# Patient Record
Sex: FEMALE | Race: WHITE | NOT HISPANIC OR LATINO | Employment: UNEMPLOYED | URBAN - METROPOLITAN AREA
[De-identification: names, ages, dates, MRNs, and addresses within clinical notes are randomized per-mention and may not be internally consistent; named-entity substitution may affect disease eponyms.]

---

## 2018-11-22 ENCOUNTER — HOSPITAL ENCOUNTER (INPATIENT)
Facility: HOSPITAL | Age: 73
LOS: 18 days | Discharge: NURSING, REHAB OR LONG-TERM CARE | DRG: 100 | End: 2018-12-10
Attending: PSYCHIATRY & NEUROLOGY | Admitting: PSYCHIATRY & NEUROLOGY
Payer: MEDICARE

## 2018-11-22 DIAGNOSIS — R56.9 SEIZURE: ICD-10-CM

## 2018-11-22 DIAGNOSIS — R41.82 ALTERED MENTAL STATUS, UNSPECIFIED ALTERED MENTAL STATUS TYPE: ICD-10-CM

## 2018-11-22 DIAGNOSIS — I21.9 MI (MYOCARDIAL INFARCTION): ICD-10-CM

## 2018-11-22 DIAGNOSIS — R94.31 QT PROLONGATION: ICD-10-CM

## 2018-11-22 DIAGNOSIS — I46.9 CARDIAC ARREST WITH VENTRICULAR FIBRILLATION: ICD-10-CM

## 2018-11-22 DIAGNOSIS — R41.82 ALTERED MENTAL STATUS: ICD-10-CM

## 2018-11-22 DIAGNOSIS — S12.01XA CLOSED STABLE BURST FRACTURE OF FIRST CERVICAL VERTEBRA, INITIAL ENCOUNTER: ICD-10-CM

## 2018-11-22 DIAGNOSIS — S12.9XXA CERVICAL SPINE FRACTURE: ICD-10-CM

## 2018-11-22 DIAGNOSIS — I49.9 ARRHYTHMIA: ICD-10-CM

## 2018-11-22 DIAGNOSIS — I47.20 VT (VENTRICULAR TACHYCARDIA): ICD-10-CM

## 2018-11-22 DIAGNOSIS — I49.01 CARDIAC ARREST WITH VENTRICULAR FIBRILLATION: ICD-10-CM

## 2018-11-22 DIAGNOSIS — I46.9 CARDIAC ARREST: ICD-10-CM

## 2018-11-22 PROBLEM — D72.829 LEUKOCYTOSIS: Status: ACTIVE | Noted: 2018-11-22

## 2018-11-22 LAB
ABO + RH BLD: NORMAL
ALBUMIN SERPL BCP-MCNC: 2.7 G/DL
ALP SERPL-CCNC: 71 U/L
ALT SERPL W/O P-5'-P-CCNC: 26 U/L
ANION GAP SERPL CALC-SCNC: 9 MMOL/L
APTT BLDCRRT: 25.6 SEC
AST SERPL-CCNC: 60 U/L
BACTERIA #/AREA URNS AUTO: ABNORMAL /HPF
BASOPHILS # BLD AUTO: 0.04 K/UL
BASOPHILS # BLD AUTO: 0.06 K/UL
BASOPHILS NFR BLD: 0.3 %
BASOPHILS NFR BLD: 0.3 %
BILIRUB SERPL-MCNC: 0.7 MG/DL
BILIRUB UR QL STRIP: NEGATIVE
BLD GP AB SCN CELLS X3 SERPL QL: NORMAL
BUN SERPL-MCNC: 12 MG/DL
CALCIUM SERPL-MCNC: 8.6 MG/DL
CHLORIDE SERPL-SCNC: 108 MMOL/L
CHOLEST SERPL-MCNC: 132 MG/DL
CHOLEST/HDLC SERPL: 5.5 {RATIO}
CK SERPL-CCNC: 127 U/L
CLARITY UR REFRACT.AUTO: ABNORMAL
CO2 SERPL-SCNC: 20 MMOL/L
COLOR UR AUTO: YELLOW
CREAT SERPL-MCNC: 1.1 MG/DL
DIFFERENTIAL METHOD: ABNORMAL
DIFFERENTIAL METHOD: ABNORMAL
EOSINOPHIL # BLD AUTO: 0 K/UL
EOSINOPHIL # BLD AUTO: 0 K/UL
EOSINOPHIL NFR BLD: 0 %
EOSINOPHIL NFR BLD: 0 %
ERYTHROCYTE [DISTWIDTH] IN BLOOD BY AUTOMATED COUNT: 19.5 %
ERYTHROCYTE [DISTWIDTH] IN BLOOD BY AUTOMATED COUNT: 19.8 %
EST. GFR  (AFRICAN AMERICAN): 58.4 ML/MIN/1.73 M^2
EST. GFR  (NON AFRICAN AMERICAN): 50.6 ML/MIN/1.73 M^2
ESTIMATED AVG GLUCOSE: 126 MG/DL
GLUCOSE SERPL-MCNC: 135 MG/DL
GLUCOSE UR QL STRIP: NEGATIVE
HBA1C MFR BLD HPLC: 6 %
HCT VFR BLD AUTO: 28.8 %
HCT VFR BLD AUTO: 29.8 %
HDLC SERPL-MCNC: 24 MG/DL
HDLC SERPL: 18.2 %
HGB BLD-MCNC: 8.3 G/DL
HGB BLD-MCNC: 8.5 G/DL
HGB UR QL STRIP: ABNORMAL
HYALINE CASTS UR QL AUTO: 0 /LPF
IMM GRANULOCYTES # BLD AUTO: 0.1 K/UL
IMM GRANULOCYTES # BLD AUTO: 0.15 K/UL
IMM GRANULOCYTES NFR BLD AUTO: 0.6 %
IMM GRANULOCYTES NFR BLD AUTO: 0.8 %
INR PPP: 1.1
KETONES UR QL STRIP: ABNORMAL
LACTATE SERPL-SCNC: 2.9 MMOL/L
LDLC SERPL CALC-MCNC: 92 MG/DL
LEUKOCYTE ESTERASE UR QL STRIP: ABNORMAL
LYMPHOCYTES # BLD AUTO: 1.4 K/UL
LYMPHOCYTES # BLD AUTO: 1.7 K/UL
LYMPHOCYTES NFR BLD: 10.4 %
LYMPHOCYTES NFR BLD: 7.8 %
MAGNESIUM SERPL-MCNC: 2.3 MG/DL
MCH RBC QN AUTO: 22.5 PG
MCH RBC QN AUTO: 23.8 PG
MCHC RBC AUTO-ENTMCNC: 27.9 G/DL
MCHC RBC AUTO-ENTMCNC: 29.5 G/DL
MCV RBC AUTO: 81 FL
MCV RBC AUTO: 81 FL
MICROSCOPIC COMMENT: ABNORMAL
MONOCYTES # BLD AUTO: 1.2 K/UL
MONOCYTES # BLD AUTO: 1.3 K/UL
MONOCYTES NFR BLD: 7.1 %
MONOCYTES NFR BLD: 7.7 %
NEUTROPHILS # BLD AUTO: 12.9 K/UL
NEUTROPHILS # BLD AUTO: 15.1 K/UL
NEUTROPHILS NFR BLD: 81 %
NEUTROPHILS NFR BLD: 84 %
NITRITE UR QL STRIP: NEGATIVE
NONHDLC SERPL-MCNC: 108 MG/DL
NRBC BLD-RTO: 0 /100 WBC
NRBC BLD-RTO: 0 /100 WBC
PH UR STRIP: 5 [PH] (ref 5–8)
PHOSPHATE SERPL-MCNC: 3.2 MG/DL
PLATELET # BLD AUTO: 342 K/UL
PLATELET # BLD AUTO: 368 K/UL
PMV BLD AUTO: 9.8 FL
PMV BLD AUTO: 9.9 FL
POCT GLUCOSE: 138 MG/DL (ref 70–110)
POTASSIUM SERPL-SCNC: 4.8 MMOL/L
PROT SERPL-MCNC: 8.6 G/DL
PROT UR QL STRIP: ABNORMAL
PROTHROMBIN TIME: 11.2 SEC
RBC # BLD AUTO: 3.57 M/UL
RBC # BLD AUTO: 3.69 M/UL
RBC #/AREA URNS AUTO: 14 /HPF (ref 0–4)
SODIUM SERPL-SCNC: 137 MMOL/L
SP GR UR STRIP: 1.01 (ref 1–1.03)
TRIGL SERPL-MCNC: 80 MG/DL
TROPONIN I SERPL DL<=0.01 NG/ML-MCNC: 0.14 NG/ML
TSH SERPL DL<=0.005 MIU/L-ACNC: 1.62 UIU/ML
URATE CRY UR QL COMP ASSIST: ABNORMAL
URN SPEC COLLECT METH UR: ABNORMAL
WBC # BLD AUTO: 15.92 K/UL
WBC # BLD AUTO: 18.03 K/UL
WBC #/AREA URNS AUTO: 4 /HPF (ref 0–5)

## 2018-11-22 PROCEDURE — 87070 CULTURE OTHR SPECIMN AEROBIC: CPT

## 2018-11-22 PROCEDURE — 80053 COMPREHEN METABOLIC PANEL: CPT

## 2018-11-22 PROCEDURE — 86901 BLOOD TYPING SEROLOGIC RH(D): CPT | Mod: 91

## 2018-11-22 PROCEDURE — 99900035 HC TECH TIME PER 15 MIN (STAT)

## 2018-11-22 PROCEDURE — 27000221 HC OXYGEN, UP TO 24 HOURS

## 2018-11-22 PROCEDURE — 99291 CRITICAL CARE FIRST HOUR: CPT | Mod: 25,GC,, | Performed by: PSYCHIATRY & NEUROLOGY

## 2018-11-22 PROCEDURE — 87205 SMEAR GRAM STAIN: CPT

## 2018-11-22 PROCEDURE — 25000003 PHARM REV CODE 250: Performed by: STUDENT IN AN ORGANIZED HEALTH CARE EDUCATION/TRAINING PROGRAM

## 2018-11-22 PROCEDURE — 82550 ASSAY OF CK (CPK): CPT

## 2018-11-22 PROCEDURE — 84484 ASSAY OF TROPONIN QUANT: CPT

## 2018-11-22 PROCEDURE — 63600175 PHARM REV CODE 636 W HCPCS: Performed by: STUDENT IN AN ORGANIZED HEALTH CARE EDUCATION/TRAINING PROGRAM

## 2018-11-22 PROCEDURE — 99223 1ST HOSP IP/OBS HIGH 75: CPT | Mod: ,,, | Performed by: NEUROLOGICAL SURGERY

## 2018-11-22 PROCEDURE — 63600175 PHARM REV CODE 636 W HCPCS: Performed by: NURSE PRACTITIONER

## 2018-11-22 PROCEDURE — 36415 COLL VENOUS BLD VENIPUNCTURE: CPT

## 2018-11-22 PROCEDURE — 84100 ASSAY OF PHOSPHORUS: CPT

## 2018-11-22 PROCEDURE — 5A1955Z RESPIRATORY VENTILATION, GREATER THAN 96 CONSECUTIVE HOURS: ICD-10-PCS | Performed by: PSYCHIATRY & NEUROLOGY

## 2018-11-22 PROCEDURE — 87040 BLOOD CULTURE FOR BACTERIA: CPT | Mod: 59

## 2018-11-22 PROCEDURE — 36620 INSERTION CATHETER ARTERY: CPT | Mod: ,,, | Performed by: PSYCHIATRY & NEUROLOGY

## 2018-11-22 PROCEDURE — 25000003 PHARM REV CODE 250: Performed by: NURSE PRACTITIONER

## 2018-11-22 PROCEDURE — 94002 VENT MGMT INPAT INIT DAY: CPT

## 2018-11-22 PROCEDURE — 85730 THROMBOPLASTIN TIME PARTIAL: CPT

## 2018-11-22 PROCEDURE — 83735 ASSAY OF MAGNESIUM: CPT

## 2018-11-22 PROCEDURE — 84443 ASSAY THYROID STIM HORMONE: CPT

## 2018-11-22 PROCEDURE — 20000000 HC ICU ROOM

## 2018-11-22 PROCEDURE — 80061 LIPID PANEL: CPT

## 2018-11-22 PROCEDURE — 94761 N-INVAS EAR/PLS OXIMETRY MLT: CPT

## 2018-11-22 PROCEDURE — 95951 PR EEG MONITORING/VIDEORECORD: CPT | Mod: 26,,, | Performed by: PSYCHIATRY & NEUROLOGY

## 2018-11-22 PROCEDURE — 81001 URINALYSIS AUTO W/SCOPE: CPT

## 2018-11-22 PROCEDURE — 80177 DRUG SCRN QUAN LEVETIRACETAM: CPT

## 2018-11-22 PROCEDURE — 85610 PROTHROMBIN TIME: CPT

## 2018-11-22 PROCEDURE — 83036 HEMOGLOBIN GLYCOSYLATED A1C: CPT

## 2018-11-22 PROCEDURE — 85025 COMPLETE CBC W/AUTO DIFF WBC: CPT

## 2018-11-22 PROCEDURE — 99900026 HC AIRWAY MAINTENANCE (STAT)

## 2018-11-22 PROCEDURE — 83605 ASSAY OF LACTIC ACID: CPT

## 2018-11-22 PROCEDURE — 86901 BLOOD TYPING SEROLOGIC RH(D): CPT

## 2018-11-22 RX ORDER — SODIUM CHLORIDE 0.9 % (FLUSH) 0.9 %
3 SYRINGE (ML) INJECTION EVERY 8 HOURS
Status: DISCONTINUED | OUTPATIENT
Start: 2018-11-23 | End: 2018-12-11 | Stop reason: HOSPADM

## 2018-11-22 RX ORDER — POTASSIUM CHLORIDE 20 MEQ/15ML
40 SOLUTION ORAL
Status: DISCONTINUED | OUTPATIENT
Start: 2018-11-23 | End: 2018-12-01

## 2018-11-22 RX ORDER — ONDANSETRON 2 MG/ML
4 INJECTION INTRAMUSCULAR; INTRAVENOUS EVERY 8 HOURS PRN
Status: DISCONTINUED | OUTPATIENT
Start: 2018-11-23 | End: 2018-12-11 | Stop reason: HOSPADM

## 2018-11-22 RX ORDER — LEVETIRACETAM 10 MG/ML
1000 INJECTION INTRAVASCULAR EVERY 12 HOURS
Status: DISCONTINUED | OUTPATIENT
Start: 2018-11-22 | End: 2018-11-23

## 2018-11-22 RX ORDER — SODIUM,POTASSIUM PHOSPHATES 280-250MG
2 POWDER IN PACKET (EA) ORAL
Status: DISCONTINUED | OUTPATIENT
Start: 2018-11-23 | End: 2018-12-01

## 2018-11-22 RX ORDER — HEPARIN SODIUM 5000 [USP'U]/ML
5000 INJECTION, SOLUTION INTRAVENOUS; SUBCUTANEOUS EVERY 8 HOURS
Status: DISCONTINUED | OUTPATIENT
Start: 2018-11-22 | End: 2018-11-22

## 2018-11-22 RX ORDER — HEPARIN SODIUM 5000 [USP'U]/ML
5000 INJECTION, SOLUTION INTRAVENOUS; SUBCUTANEOUS EVERY 8 HOURS
Status: DISCONTINUED | OUTPATIENT
Start: 2018-11-22 | End: 2018-11-23

## 2018-11-22 RX ORDER — SODIUM CHLORIDE 9 MG/ML
INJECTION, SOLUTION INTRAVENOUS CONTINUOUS
Status: DISCONTINUED | OUTPATIENT
Start: 2018-11-22 | End: 2018-11-25

## 2018-11-22 RX ORDER — IPRATROPIUM BROMIDE AND ALBUTEROL SULFATE 2.5; .5 MG/3ML; MG/3ML
3 SOLUTION RESPIRATORY (INHALATION) EVERY 6 HOURS PRN
Status: DISCONTINUED | OUTPATIENT
Start: 2018-11-23 | End: 2018-12-11 | Stop reason: HOSPADM

## 2018-11-22 RX ORDER — LANOLIN ALCOHOL/MO/W.PET/CERES
800 CREAM (GRAM) TOPICAL
Status: DISCONTINUED | OUTPATIENT
Start: 2018-11-23 | End: 2018-12-01

## 2018-11-22 RX ORDER — POLYETHYLENE GLYCOL 3350 17 G/17G
17 POWDER, FOR SOLUTION ORAL DAILY
Status: DISCONTINUED | OUTPATIENT
Start: 2018-11-23 | End: 2018-11-22

## 2018-11-22 RX ORDER — MIDAZOLAM HYDROCHLORIDE 1 MG/ML
2 INJECTION, SOLUTION INTRAMUSCULAR; INTRAVENOUS EVERY 5 MIN PRN
Status: DISCONTINUED | OUTPATIENT
Start: 2018-11-22 | End: 2018-11-28

## 2018-11-22 RX ORDER — CHLORHEXIDINE GLUCONATE ORAL RINSE 1.2 MG/ML
15 SOLUTION DENTAL 4 TIMES DAILY
Status: DISCONTINUED | OUTPATIENT
Start: 2018-11-22 | End: 2018-11-27

## 2018-11-22 RX ORDER — AMOXICILLIN 250 MG
1 CAPSULE ORAL DAILY
Status: DISCONTINUED | OUTPATIENT
Start: 2018-11-23 | End: 2018-11-26

## 2018-11-22 RX ORDER — AMOXICILLIN 250 MG
1 CAPSULE ORAL 2 TIMES DAILY
Status: DISCONTINUED | OUTPATIENT
Start: 2018-11-22 | End: 2018-11-22

## 2018-11-22 RX ORDER — POTASSIUM CHLORIDE 20 MEQ/15ML
60 SOLUTION ORAL
Status: DISCONTINUED | OUTPATIENT
Start: 2018-11-23 | End: 2018-12-01

## 2018-11-22 RX ORDER — SODIUM CHLORIDE 0.9 % (FLUSH) 0.9 %
3 SYRINGE (ML) INJECTION
Status: DISCONTINUED | OUTPATIENT
Start: 2018-11-22 | End: 2018-12-06

## 2018-11-22 RX ADMIN — CHLORHEXIDINE GLUCONATE 0.12% ORAL RINSE 15 ML: 1.2 LIQUID ORAL at 11:11

## 2018-11-22 RX ADMIN — HEPARIN SODIUM 5000 UNITS: 5000 INJECTION, SOLUTION INTRAVENOUS; SUBCUTANEOUS at 08:11

## 2018-11-22 RX ADMIN — DEXTROSE 2000 MG: 5 SOLUTION INTRAVENOUS at 05:11

## 2018-11-22 RX ADMIN — SODIUM CHLORIDE: 0.9 INJECTION, SOLUTION INTRAVENOUS at 04:11

## 2018-11-22 RX ADMIN — LEVETIRACETAM 1000 MG: 10 INJECTION INTRAVENOUS at 08:11

## 2018-11-22 NOTE — NURSING
Patient arrived to [facility] from [place] by [facility]    Type of stroke/diagnosis: C1/C2 Fracture     Current symptoms: Fall w/ seizure activity    Skin assessment done: Yes  Wounds noted: No wounds, redness around perineum    NCC notified: MD Jayshree

## 2018-11-22 NOTE — HPI
73 y F transferred from [place] for cervical spine injury and seizure activity. She has pmh of HTN, DM, seizure disorder and had an unwitnessed seizure (presumed) at nursing home today followed by fall. On arrival by EMS she was responding minimally. Did not require CPR. She had one seizure en route to hospital at [place] and received versed. At ER at OSH, patient had another witnessed seizure with tonic clonic activity in extremities and became unresponsive. She was intubated, and given iv fosphenytoin.  CTH was negative. CT c-spine showed odontoid fracture and C1 fracture. She received versed and fosphenytoin and OSH, when [place] [facility] [place] she was started on ketamine gtt at 0.5 mcg/kg/min. She was [place] [facility] [place] requiring neurosurg intervention and also seizure monitoring and control.      TO note  Patient had recent pneumonia and GI bleed earlier this month and was treated at OSH. She was discharged on 11/20 with levaquin for pneumonia and returned to NH

## 2018-11-22 NOTE — PROCEDURES
"Melania Malagon is a 73 y.o. female patient.    Pulse: 101 (18 1344)  Resp: (!) 21 (18 1344)  BP: (!) 129/59 (18 1344)  SpO2: 100 % (18 1344)       Arterial Line  Date/Time: 2018 2:35 PM  Location procedure was performed: [facility] NEURO CRITICAL CARE  Performed by: Bakari Badillo MD  Authorized by: Bakari Badillo MD   Pre-op Diagnosis: Cervical Injury  Post-operative diagnosis: Cervical Injury  Consent Done: Yes  Consent: Verbal consent obtained. Written consent obtained.  Risks and benefits: risks, benefits and alternatives were discussed  Consent given by: power of   Procedure consent: procedure consent matches procedure scheduled  Required items: required blood products, implants, devices, and special equipment available  Patient identity confirmed: , MRN and name  Time out: Immediately prior to procedure a "time out" was called to verify the correct patient, procedure, equipment, support staff and site/side marked as required.  Preparation: Patient was prepped and draped in the usual sterile fashion.  Indications: multiple ABGs, respiratory failure and hemodynamic monitoring  Location: left radial  Needle gauge: 20  Seldinger technique: Seldinger technique used  Number of attempts: 1  Complications: No  Post-procedure: dressing applied  Post-procedure CMS: normal  Patient tolerance: Patient tolerated the procedure well with no immediate complications          Bakari Badillo  2018  "

## 2018-11-22 NOTE — ASSESSMENT & PLAN NOTE
Can be reactive, will monitor  will also order blood cx and resp cx, UA w cx.   CXR, KUB ordered  Patient with recent pneumonia at OSH and was discharged with po levaquin to nh

## 2018-11-22 NOTE — SUBJECTIVE & OBJECTIVE
Review of Systems  Unable to obtain a complete ROS due to level of consciousness.  Objective:     Vitals:  Temp: 98.6 °F (37 °C)  Pulse: 91  Rhythm: normal sinus rhythm  BP: 139/63  MAP (mmHg): 91  Resp: 20  SpO2: 100 %  Oxygen Concentration (%): 40  O2 Device (Oxygen Therapy): ventilator  Vent Mode: A/C  Set Rate: 15 bmp  Vt Set: 500 mL  PEEP/CPAP: 5 cmH20  Peak Airway Pressure: 21 cmH2O  Mean Airway Pressure: 8.6 cmH20  Plateau Pressure: 0 cmH20    Temp  Min: 98.1 °F (36.7 °C)  Max: 98.6 °F (37 °C)  Pulse  Min: 91  Max: 105  BP  Min: 127/60  Max: 159/64  MAP (mmHg)  Min: 86  Max: 91  Resp  Min: 17  Max: 28  SpO2  Min: 100 %  Max: 100 %  Oxygen Concentration (%)  Min: 40  Max: 41    No intake/output data recorded.   Unmeasured Output  Urine Occurrence: 1       Physical Exam  General: appears well-developed and well-nourished  Eyes: PERRLA  Cardiovascular: RRR  Pulm:intubated   Neurological: E2VTM5,   PERRLA, CN II-XII grossly intact and face symm,not following commands,    WD on BLE,   +3 DTR  Knee jerk, DTR symmetrical,   Bauman: present, Clonus: absent, Babinski: absent        Unable to test orientation, language, memory, judgment, insight, fund of knowledge, hearing, shoulder shrug, tongue protrusion, coordination, gait due to level of consciousness.    Medications:  Continuous  sodium chloride 0.9% Last Rate: 75 mL/hr at 11/22/18 1659   Scheduled  levetiracetam IVPB 1,000 mg Q12H   PRN  midazolam 2 mg Q5 Min PRN   sodium chloride 0.9% 3 mL PRN     Today I personally reviewed pertinent medications, lines/drains/airways, imaging, cardiology results, laboratory results, microbiology results, notably:    Diet  No diet orders on file  No diet orders on file

## 2018-11-22 NOTE — ASSESSMENT & PLAN NOTE
CT c- spine at osh with odontoid fracture and C1 fracture  -admit to neuro ICU  - neuro checks q1  MRI C-spine pending  Consulted Neurosurgery who recommend no acute neurosurgical intervention  -continue C collar  -BP monitoring via A-line to determine if any autonomic changes.

## 2018-11-22 NOTE — HOSPITAL COURSE
11/22: admit to Neuro ICU for higher level of care. Neurosurgery consulted for C-spine fracture. MRI c-spine ordered. Continous EEG monitoring. Epilepsy consulted. Loaded keppra and maintenance keppra  11/26: seroquel, derm consult, discussion with NSGY regarding surgical plan, daughter updated at bedside  11/27: ECG, increase seroquel, Vtach this morning, then bradycardia, wean off precedex, consult nutrition for TF change, Husks x 3 days, obtain smaller MJcollar  11/28: weaning parameters again, stop vimpat, metoprolol, decrease keppra, wean precedex, anemia workup, bradycardia with hypotension event this morning  11/29: no cuff leak this morning, start decadron then re-eval later  11/30: extubate today, start SQH, PT OT SLP  12/1: NAEO, Passed swallow eval today   12/2 Had a VF cardiac arrest and was intubated last night   12/3: EEG read, continue amio, replete Mg, wean to extubate today, place CVC for better access  12/4: extubated yesterday, SLP, PT, OT, stop EEG, send procal, step down to IMT  12/5: step down to IMT

## 2018-11-22 NOTE — HPI
Melania Malagon is a 73 y.o. year old female with PMHx of HTN, DM, seizure disorder who lives in NH and transferred to [facility] with SE and cervical frx for care escalation. Unable to obtain hx due to AMS. Per charting, she had seizure at NH followed by mechanical fall. At OSH, she was intubated for airway protection and loaded with AED. Lab was significant for leucocytosis, lactic acidosis , and elevated ammonia. CT head was negative for acute process. C spine CT shows minimally displaced type 1 samuel frx and type 2 odontoid frx without retropulsion or canal compromise. She is on ASA per charting. NSGY consulted for further evaluation and possible intervention.

## 2018-11-22 NOTE — H&P
[facility]  Neurocritical Care  History & Physical    Admit Date: 11/22/2018  Service Date: 11/22/2018  Length of Stay: 0    Subjective:     Chief Complaint: <principal problem not specified>    History of Present Illness: 73 y F transferred from [place] for cervical spine injury and seizure activity. She has pmh of HTN, DM, seizure disorder and had an unwitnessed seizure (presumed) at nursing home today followed by fall. On arrival by EMS she was responding minimally. Did not require CPR. She had one seizure en route to hospital at [place] and received versed. At ER at OSH, patient had another witnessed seizure with tonic clonic activity in extremities and became unresponsive. She was intubated, and given iv fosphenytoin.  CTH was negative. CT c-spine showed odontoid fracture and C1 fracture. She received versed and fosphenytoin and OSH, when she was transferred by helicopter to [facility], en route she was started on ketamine gtt at 0.5 mcg/kg/min. She was transferred to [facility] for cervical fracture requiring neurosurg intervention and also seizure monitoring and control.    TO note  Patient had recent pneumonia and GI bleed earlier this month and was treated at OSH. She was discharged on 11/20 with levaquin for pneumonia and returned to NH     PMH: seizure disorder, HTN DM2,   Allegries: sulfa  Social h/o: lives in NH        Review of Systems  Unable to obtain a complete ROS due to level of consciousness.  Objective:     Vitals:  Temp: 98.6 °F (37 °C)  Pulse: 91  Rhythm: normal sinus rhythm  BP: 139/63  MAP (mmHg): 91  Resp: 20  SpO2: 100 %  Oxygen Concentration (%): 40  O2 Device (Oxygen Therapy): ventilator  Vent Mode: A/C  Set Rate: 15 bmp  Vt Set: 500 mL  PEEP/CPAP: 5 cmH20  Peak Airway Pressure: 21 cmH2O  Mean Airway Pressure: 8.6 cmH20  Plateau Pressure: 0 cmH20    Temp  Min: 98.1 °F (36.7 °C)  Max: 98.6 °F (37 °C)  Pulse  Min: 91  Max: 105  BP  Min: 127/60  Max: 159/64  MAP (mmHg)  Min: 86  Max:  91  Resp  Min: 17  Max: 28  SpO2  Min: 100 %  Max: 100 %  Oxygen Concentration (%)  Min: 40  Max: 41    No intake/output data recorded.   Unmeasured Output  Urine Occurrence: 1       Physical Exam  General: appears well-developed and well-nourished  Eyes: PERRLA  Cardiovascular: RRR  Pulm:intubated   Neurological: E2VTM5,   PERRLA, CN II-XII grossly intact and face symm,not following commands,    WD on BLE,   +3 DTR  Knee jerk, DTR symmetrical,   Bauman: present, Clonus: absent, Babinski: absent        Unable to test orientation, language, memory, judgment, insight, fund of knowledge, hearing, shoulder shrug, tongue protrusion, coordination, gait due to level of consciousness.    Medications:  Continuous  sodium chloride 0.9% Last Rate: 75 mL/hr at 11/22/18 1659   Scheduled  levetiracetam IVPB 1,000 mg Q12H   PRN  midazolam 2 mg Q5 Min PRN   sodium chloride 0.9% 3 mL PRN     Today I personally reviewed pertinent medications, lines/drains/airways, imaging, cardiology results, laboratory results, microbiology results, notably:    Diet  No diet orders on file  No diet orders on file          Assessment/Plan:     Neuro   Altered mental status    Likely due to seizure activity. continous eeg monitoring. keppra load and maintenance keppra. Appreciate epilepsy service recs.  CTH neg at OSH. Repeat CTH tomorrow  -holding asa per neurosurg recs     Endocrine: TSH wnl. BG <200  Electrolytes: replete as needed  Metabolic: ammonia elevated. But holding lactulose for now as patient seizing and with unstable C-spine which needs caution till further imaging and eval available.  Infectious: Leukocytosis - ordered blood cx, resp cx -follow up    Pulm: patient intubated. CXR, ABG ordered. Resp cx ordered    CVS: ordered troponin, EKG, ECHo pending. Started IVF at 75cc/h    GI: npo    Kidneys: GFR >50 but <60. BUN/Cr stable. CK 72. Repeat ordered. IVF at 75 cc/h.                  UA pending     Seizures    Seizures at OSH  -received  versed and IV fosphenytoin at OSH. Started on ketamine gtt 0.5 mcg/kg/min en route in helicopter  -On arrival to [facility], loaded with keppra 2 g IV and maintenance keppra 1 g IV BID  -continous EEG monitoring  -Epilepsy consulted     Cervical spine fracture    CT c- spine at osh with odontoid fracture and C1 fracture  -admit to neuro ICU  - neuro checks q1  MRI C-spine pending  Consulted Neurosurgery who recommend no acute neurosurgical intervention  -continue C collar  -BP monitoring via A-line to determine if any autonomic changes.             Leukocytosis    Can be reactive, will monitor  will also order blood cx and resp cx, UA w cx.   CXR, KUB ordered  Patient with recent pneumonia at OSH and was discharged with po levaquin to nh           The patient is being Prophylaxed for:  Venous Thromboembolism with: Mechanical  Stress Ulcer with: None  Ventilator Pneumonia with: chlorhexidine oral care    Activity Orders          None        Full Code    Jayshree Tim MD  Neurocritical Care  [facility]

## 2018-11-22 NOTE — ASSESSMENT & PLAN NOTE
Likely due to seizure activity. continous eeg monitoring. keppra load and maintenance keppra. Appreciated epilepsy service recs.  CTH neg at OSH. Repeat CTH tomorrow  -holding asa per neurosurg recs     Endocrine: TSH wnl. BG <200  Electrolytes: replete as needed  Metabolic: ammonia elevated. But holding lactulose for now as patient seizing and with unstable C-spine which needs caution till further imaging and eval available.  Infectious: Leukocytosis - ordered blood cx, resp cx -follow up    Pulm: patient intubated. CXR, ABG ordered. Resp cx ordered    CVS: ordered troponin, EKG, ECHo pending. Started IVF at 75cc/h    GI: npo    Kidneys: GFR >50 but <60. BUN/Cr stable. CK 72. Repeat ordered. IVF at 75 cc/h.                  UA pending

## 2018-11-22 NOTE — CONSULTS
"Neurosurgery consult note    11/22/2018      Consult Requested By: ED  Reason for Consult: "Cervical frx"    SUBJECTIVE:   CC:  Seizure followed by fall     HPI: Melania Malagon is a 73 y.o. year old female with PMHx of HTN, DM, seizure disorder who lives in NH and transferred to [facility] with SE and cervical frx for care escalation. Unable to obtain hx due to AMS. Per charting, she had seizure at NH followed by mechanical fall. At OSH, she was intubated for airway protection and loaded with AED. Lab was significant for leucocytosis, lactic acidosis , and elevated ammonia. CT head was negative for acute process. C spine CT shows minimally displaced type 1 samuel frx and type 2 odontoid frx without retropulsion or canal compromise. She is on ASA per charting. NSGY consulted for further evaluation and possible intervention.      Active problems:  Patient Active Problem List   Diagnosis    Cervical spine fracture       ROS: Unable to obtain     PSHx:   C/S   Cholecystomy     Social Hx:  Social History     Socioeconomic History    Marital status: Unknown     Spouse name: Not on file    Number of children: Not on file    Years of education: Not on file    Highest education level: Not on file   Social Needs    Financial resource strain: Not on file    Food insecurity - worry: Not on file    Food insecurity - inability: Not on file    Transportation needs - medical: Not on file    Transportation needs - non-medical: Not on file   Occupational History    Not on file   Tobacco Use    Smoking status: Not on file   Substance and Sexual Activity    Alcohol use: Not on file    Drug use: Not on file    Sexual activity: Not on file   Other Topics Concern    Not on file   Social History Narrative    Not on file           Allergy:  Review of patient's allergies indicates:   Allergen Reactions    Sulfa (sulfonamide antibiotics)          Home Medication:  No current facility-administered medications on file prior to " encounter.      No current outpatient medications on file prior to encounter.         Scheduled Meds:   heparin (porcine)  5,000 Units Subcutaneous Q8H    levetiracetam IVPB  2,000 mg Intravenous Once    levetiracetam IVPB  1,000 mg Intravenous Q12H       Continuous Infusions:   sodium chloride 0.9%         PRN Meds:  midazolam, sodium chloride 0.9%    OBJECTIVE:     Vital Signs (Most Recent)  Temp: 98.6 °F (37 °C) (11/22/18 1330)  Pulse: 96 (11/22/18 1505)  Resp: 17 (11/22/18 1505)  BP: 139/63 (11/22/18 1505)  SpO2: 100 % (11/22/18 1505)      Vital Signs Range (Last 24H):  Temp:  [98.1 °F (36.7 °C)-98.6 °F (37 °C)]   Pulse:  []   Resp:  [17-28]   BP: (127-159)/(59-64)   SpO2:  [100 %]   Arterial Line BP: (145)/(60)     Respiratory Support:  Vent Mode: A/C  Oxygen Concentration (%):  [40-41] 40  Resp Rate Total:  [17 br/min-19 br/min] 17 br/min  Vt Set:  [500 mL] 500 mL  PEEP/CPAP:  [5 cmH20] 5 cmH20  Mean Airway Pressure:  [10 cmH20] 10 cmH20      I/O:  No intake or output data in the 24 hours ending 11/22/18 1638      Physical exam:   General: appears well-developed and well-nourished  Eyes: PERRLA  Cardiovascular: RRR  Pulm:intubated   Neurological: E2VTM5, PERRLA, CN II-XII grossly intact and face symm,not following commands, localizing on BUE, WD on BLE, non focal and move all antigravity with stim, +3 DTR  Knee jerk, DTR symmetrical, Bauman: present, Clonus: absent, Babinski: absent      Laboratory:    Recent Results (from the past 24 hour(s))   POCT glucose    Collection Time: 11/22/18  1:35 PM   Result Value Ref Range    POCT Glucose 138 (H) 70 - 110 mg/dL   CBC auto differential    Collection Time: 11/22/18  1:39 PM   Result Value Ref Range    WBC 18.03 (H) 3.90 - 12.70 K/uL    RBC 3.69 (L) 4.00 - 5.40 M/uL    Hemoglobin 8.3 (L) 12.0 - 16.0 g/dL    Hematocrit 29.8 (L) 37.0 - 48.5 %    MCV 81 (L) 82 - 98 fL    MCH 22.5 (L) 27.0 - 31.0 pg    MCHC 27.9 (L) 32.0 - 36.0 g/dL    RDW 19.5 (H) 11.5 -  14.5 %    Platelets 368 (H) 150 - 350 K/uL    MPV 9.8 9.2 - 12.9 fL    Immature Granulocytes 0.8 (H) 0.0 - 0.5 %    Gran # (ANC) 15.1 (H) 1.8 - 7.7 K/uL    Immature Grans (Abs) 0.15 (H) 0.00 - 0.04 K/uL    Lymph # 1.4 1.0 - 4.8 K/uL    Mono # 1.3 (H) 0.3 - 1.0 K/uL    Eos # 0.0 0.0 - 0.5 K/uL    Baso # 0.06 0.00 - 0.20 K/uL    nRBC 0 0 /100 WBC    Gran% 84.0 (H) 38.0 - 73.0 %    Lymph% 7.8 (L) 18.0 - 48.0 %    Mono% 7.1 4.0 - 15.0 %    Eosinophil% 0.0 0.0 - 8.0 %    Basophil% 0.3 0.0 - 1.9 %    Differential Method Automated    Comprehensive metabolic panel    Collection Time: 11/22/18  1:39 PM   Result Value Ref Range    Sodium 137 136 - 145 mmol/L    Potassium 4.8 3.5 - 5.1 mmol/L    Chloride 108 95 - 110 mmol/L    CO2 20 (L) 23 - 29 mmol/L    Glucose 135 (H) 70 - 110 mg/dL    BUN, Bld 12 8 - 23 mg/dL    Creatinine 1.1 0.5 - 1.4 mg/dL    Calcium 8.6 (L) 8.7 - 10.5 mg/dL    Total Protein 8.6 (H) 6.0 - 8.4 g/dL    Albumin 2.7 (L) 3.5 - 5.2 g/dL    Total Bilirubin 0.7 0.1 - 1.0 mg/dL    Alkaline Phosphatase 71 55 - 135 U/L    AST 60 (H) 10 - 40 U/L    ALT 26 10 - 44 U/L    Anion Gap 9 8 - 16 mmol/L    eGFR if African American 58.4 (A) >60 mL/min/1.73 m^2    eGFR if non African American 50.6 (A) >60 mL/min/1.73 m^2   Magnesium    Collection Time: 11/22/18  1:39 PM   Result Value Ref Range    Magnesium 2.3 1.6 - 2.6 mg/dL   Phosphorus    Collection Time: 11/22/18  1:39 PM   Result Value Ref Range    Phosphorus 3.2 2.7 - 4.5 mg/dL   Type & Screen    Collection Time: 11/22/18  1:39 PM   Result Value Ref Range    Group & Rh O NEG     Indirect [place] NEG    Lactic acid, plasma    Collection Time: 11/22/18  1:39 PM   Result Value Ref Range    Lactate (Lactic Acid) 2.9 (H) 0.5 - 2.2 mmol/L   APTT    Collection Time: 11/22/18  1:39 PM   Result Value Ref Range    aPTT 25.6 21.0 - 32.0 sec   Protime-INR    Collection Time: 11/22/18  1:39 PM   Result Value Ref Range    Prothrombin Time 11.2 9.0 - 12.5 sec    INR 1.1 0.8 - 1.2    Hemoglobin A1c    Collection Time: 11/22/18  1:39 PM   Result Value Ref Range    Hemoglobin A1C 6.0 (H) 4.0 - 5.6 %    Estimated Avg Glucose 126 68 - 131 mg/dL   TSH    Collection Time: 11/22/18  1:39 PM   Result Value Ref Range    TSH 1.621 0.400 - 4.000 uIU/mL   Lipid panel    Collection Time: 11/22/18  1:39 PM   Result Value Ref Range    Cholesterol 132 120 - 199 mg/dL    Triglycerides 80 30 - 150 mg/dL    HDL 24 (L) 40 - 75 mg/dL    LDL Cholesterol 92.0 63.0 - 159.0 mg/dL    HDL/Chol Ratio 18.2 (L) 20.0 - 50.0 %    Total Cholesterol/HDL Ratio 5.5 (H) 2.0 - 5.0    Non-HDL Cholesterol 108 mg/dL        ASSESSMENT/PLAN:   Melania Malagon is a 73 y.o. year old female who presents with SE and type 1 samuel and type 2 odontoid frxs. On Exam, E2VTM5, non focal. C spine CT shows minimally displaced type 1 samuel frx and type 2 odontoid frx without retropulsion or canal compromise. NSGY consulted for further evaluation and possible intervention.    -- No acute neurosurgical intervention necessary   -- Admit to neuro ICU, neurocritical care service  -- Keep C collar at all time and C spine precaution   -- Neurochecks q1h  -- Please repeat CT C spine given poor quality from OSH  -- C spine MRI for further evaluation   -- SE work up and management per NCC and epilepsy  -- Medical stabilization and optimization  -- Risk stratification and medical clearance    -- Please hold ASA  -- Please call NSGY if there any change in exam   -- Will continue to follow and leave final recs pending final imaging      Discussed with attending staff Dr.Biro Ana Claire MD  11/22/2018  4:38 PM

## 2018-11-22 NOTE — ASSESSMENT & PLAN NOTE
72 yo female admitted to [facility] with NCSE also found to have type I atlas and type II odontoid fractures.    No acute events overnight. Pt is on cEEG for seizure identification and control per primary team. MRI confirms acute atlantoaxial trauma. No central canal compromise. No traumatic disk extrusion or herniation. No extraaxial hematoma.       --Continue care per primary team.  --Continue AED regimen per epilepsy and primary team.  --Continue cervical orthosis in rigid collar at all times.  --[place] fracture is stable, odontoid fracture is unstable may be amenable to surgical intervention once pt as been medical optimized and has adequate seizure control.  --We will continue to monitor closely, please contact us with any questions or concerns.

## 2018-11-22 NOTE — PROGRESS NOTES
Pt transported to room 9092 from outside facility.  Pt intubated before arrival.  Pt was transported on vent and placed on .  ETT 8.0, 23 @ the lip.  Pt tolerated well.  Will continue to monitor.

## 2018-11-22 NOTE — ASSESSMENT & PLAN NOTE
Seizures at OSH  -received versed and IV fosphenytoin at OSH. Started on ketamine gtt 0.5 mcg/kg/min en route in helicopter  -On arrival to [facility], loaded with keppra 2 g IV and maintenance keppra 1 g IV BID  -continous EEG monitoring  -Epilepsy consulted

## 2018-11-23 PROBLEM — R40.2420 GLASGOW COMA SCALE TOTAL SCORE 9-12: Status: ACTIVE | Noted: 2018-11-23

## 2018-11-23 PROBLEM — Z97.8 ENDOTRACHEALLY INTUBATED: Status: ACTIVE | Noted: 2018-11-23

## 2018-11-23 LAB
ABO + RH BLD: NORMAL
ALBUMIN SERPL BCP-MCNC: 2.5 G/DL
ALLENS TEST: ABNORMAL
ALLENS TEST: ABNORMAL
ALP SERPL-CCNC: 69 U/L
ALT SERPL W/O P-5'-P-CCNC: 24 U/L
ANION GAP SERPL CALC-SCNC: 8 MMOL/L
APTT BLDCRRT: 25.6 SEC
ASCENDING AORTA: 2.75 CM
AST SERPL-CCNC: 84 U/L
AV INDEX (PROSTH): 0.62
AV MEAN GRADIENT: 10.16 MMHG
AV PEAK GRADIENT: 19.36 MMHG
AV VALVE AREA: 1.67 CM2
BASOPHILS # BLD AUTO: 0.07 K/UL
BASOPHILS # BLD AUTO: 0.08 K/UL
BASOPHILS # BLD AUTO: 0.08 K/UL
BASOPHILS # BLD AUTO: 0.09 K/UL
BASOPHILS # BLD AUTO: 0.1 K/UL
BASOPHILS NFR BLD: 0.5 %
BASOPHILS NFR BLD: 0.6 %
BASOPHILS NFR BLD: 0.8 %
BILIRUB SERPL-MCNC: 0.5 MG/DL
BLD GP AB SCN CELLS X3 SERPL QL: NORMAL
BSA FOR ECHO PROCEDURE: 1.94 M2
BUN SERPL-MCNC: 10 MG/DL
CALCIUM SERPL-MCNC: 8.1 MG/DL
CHLORIDE SERPL-SCNC: 108 MMOL/L
CO2 SERPL-SCNC: 19 MMOL/L
CREAT SERPL-MCNC: 0.9 MG/DL
CV ECHO LV RWT: 0.4 CM
DELSYS: ABNORMAL
DELSYS: ABNORMAL
DIFFERENTIAL METHOD: ABNORMAL
DOP CALC AO PEAK VEL: 2.2 M/S
DOP CALC AO VTI: 42.18 CM
DOP CALC LVOT AREA: 2.69 CM2
DOP CALC LVOT DIAMETER: 1.85 CM
DOP CALC LVOT STROKE VOLUME: 70.55 CM3
DOP CALCLVOT PEAK VEL VTI: 26.26 CM
E WAVE DECELERATION TIME: 95.21 MSEC
E/A RATIO: 1.2
E/E' RATIO: 13.29
ECHO LV POSTERIOR WALL: 0.89 CM (ref 0.6–1.1)
EOSINOPHIL # BLD AUTO: 0 K/UL
EOSINOPHIL # BLD AUTO: 0.1 K/UL
EOSINOPHIL NFR BLD: 0.1 %
EOSINOPHIL NFR BLD: 0.4 %
ERYTHROCYTE [DISTWIDTH] IN BLOOD BY AUTOMATED COUNT: 20.1 %
ERYTHROCYTE [DISTWIDTH] IN BLOOD BY AUTOMATED COUNT: 20.1 %
ERYTHROCYTE [DISTWIDTH] IN BLOOD BY AUTOMATED COUNT: 20.2 %
ERYTHROCYTE [DISTWIDTH] IN BLOOD BY AUTOMATED COUNT: 20.2 %
ERYTHROCYTE [DISTWIDTH] IN BLOOD BY AUTOMATED COUNT: 20.3 %
ERYTHROCYTE [SEDIMENTATION RATE] IN BLOOD BY WESTERGREN METHOD: 15 MM/H
EST. GFR  (AFRICAN AMERICAN): >60 ML/MIN/1.73 M^2
EST. GFR  (NON AFRICAN AMERICAN): >60 ML/MIN/1.73 M^2
FIO2: 40
FIO2: 40
FRACTIONAL SHORTENING: 36 % (ref 28–44)
GLUCOSE SERPL-MCNC: 140 MG/DL
HCO3 UR-SCNC: 24.3 MMOL/L (ref 24–28)
HCO3 UR-SCNC: 26 MMOL/L (ref 24–28)
HCT VFR BLD AUTO: 26.4 %
HCT VFR BLD AUTO: 26.8 %
HCT VFR BLD AUTO: 27.1 %
HCT VFR BLD AUTO: 27.1 %
HCT VFR BLD AUTO: 27.3 %
HGB BLD-MCNC: 7.6 G/DL
HGB BLD-MCNC: 7.7 G/DL
HGB BLD-MCNC: 7.8 G/DL
IMM GRANULOCYTES # BLD AUTO: 0.07 K/UL
IMM GRANULOCYTES # BLD AUTO: 0.07 K/UL
IMM GRANULOCYTES # BLD AUTO: 0.08 K/UL
IMM GRANULOCYTES # BLD AUTO: 0.08 K/UL
IMM GRANULOCYTES # BLD AUTO: 0.09 K/UL
IMM GRANULOCYTES NFR BLD AUTO: 0.5 %
IMM GRANULOCYTES NFR BLD AUTO: 0.5 %
IMM GRANULOCYTES NFR BLD AUTO: 0.6 %
INR PPP: 1.1
INTERVENTRICULAR SEPTUM: 0.88 CM (ref 0.6–1.1)
IVRT: 0.05 MSEC
LA MAJOR: 5.3 CM
LA MINOR: 5.14 CM
LA WIDTH: 3.78 CM
LEFT ATRIUM SIZE: 3.88 CM
LEFT ATRIUM VOLUME INDEX: 33.5 ML/M2
LEFT ATRIUM VOLUME: 65.06 CM3
LEFT INTERNAL DIMENSION IN SYSTOLE: 2.84 CM (ref 2.1–4)
LEFT VENTRICLE DIASTOLIC VOLUME INDEX: 46.07 ML/M2
LEFT VENTRICLE DIASTOLIC VOLUME: 89.37 ML
LEFT VENTRICLE MASS INDEX: 65.5 G/M2
LEFT VENTRICLE SYSTOLIC VOLUME INDEX: 15.7 ML/M2
LEFT VENTRICLE SYSTOLIC VOLUME: 30.5 ML
LEFT VENTRICULAR INTERNAL DIMENSION IN DIASTOLE: 4.44 CM (ref 3.5–6)
LEFT VENTRICULAR MASS: 127.03 G
LV LATERAL E/E' RATIO: 10.27
LV SEPTAL E/E' RATIO: 18.83
LYMPHOCYTES # BLD AUTO: 1.5 K/UL
LYMPHOCYTES # BLD AUTO: 1.5 K/UL
LYMPHOCYTES # BLD AUTO: 1.7 K/UL
LYMPHOCYTES # BLD AUTO: 1.7 K/UL
LYMPHOCYTES # BLD AUTO: 1.8 K/UL
LYMPHOCYTES NFR BLD: 10.6 %
LYMPHOCYTES NFR BLD: 10.8 %
LYMPHOCYTES NFR BLD: 12.5 %
LYMPHOCYTES NFR BLD: 12.6 %
LYMPHOCYTES NFR BLD: 12.7 %
MAGNESIUM SERPL-MCNC: 2.7 MG/DL
MCH RBC QN AUTO: 22.7 PG
MCH RBC QN AUTO: 22.8 PG
MCH RBC QN AUTO: 22.8 PG
MCH RBC QN AUTO: 22.9 PG
MCH RBC QN AUTO: 23.3 PG
MCHC RBC AUTO-ENTMCNC: 28.4 G/DL
MCHC RBC AUTO-ENTMCNC: 28.4 G/DL
MCHC RBC AUTO-ENTMCNC: 28.6 G/DL
MCHC RBC AUTO-ENTMCNC: 28.7 G/DL
MCHC RBC AUTO-ENTMCNC: 28.8 G/DL
MCV RBC AUTO: 80 FL
MCV RBC AUTO: 82 FL
MIN VOL: 9
MODE: ABNORMAL
MODE: ABNORMAL
MONOCYTES # BLD AUTO: 1 K/UL
MONOCYTES # BLD AUTO: 1.1 K/UL
MONOCYTES # BLD AUTO: 1.1 K/UL
MONOCYTES # BLD AUTO: 1.2 K/UL
MONOCYTES # BLD AUTO: 1.3 K/UL
MONOCYTES NFR BLD: 7.8 %
MONOCYTES NFR BLD: 7.9 %
MONOCYTES NFR BLD: 8.1 %
MONOCYTES NFR BLD: 8.2 %
MONOCYTES NFR BLD: 8.9 %
MV PEAK A VEL: 0.94 M/S
MV PEAK E VEL: 1.13 M/S
NEUTROPHILS # BLD AUTO: 10.3 K/UL
NEUTROPHILS # BLD AUTO: 10.4 K/UL
NEUTROPHILS # BLD AUTO: 10.7 K/UL
NEUTROPHILS # BLD AUTO: 11 K/UL
NEUTROPHILS # BLD AUTO: 11.3 K/UL
NEUTROPHILS NFR BLD: 77.9 %
NEUTROPHILS NFR BLD: 77.9 %
NEUTROPHILS NFR BLD: 78.3 %
NEUTROPHILS NFR BLD: 79.2 %
NEUTROPHILS NFR BLD: 79.9 %
NRBC BLD-RTO: 0 /100 WBC
OB PNL GAST: POSITIVE
PCO2 BLDA: 35.1 MMHG (ref 35–45)
PCO2 BLDA: 37.2 MMHG (ref 35–45)
PEEP: 5
PH SMN: 7.42 [PH] (ref 7.35–7.45)
PH SMN: 7.48 [PH] (ref 7.35–7.45)
PHOSPHATE SERPL-MCNC: 2.8 MG/DL
PIP: 22
PISA TR MAX VEL: 2.86 M/S
PLATELET # BLD AUTO: 340 K/UL
PLATELET # BLD AUTO: 344 K/UL
PLATELET # BLD AUTO: 350 K/UL
PLATELET # BLD AUTO: 351 K/UL
PLATELET # BLD AUTO: 353 K/UL
PMV BLD AUTO: 10 FL
PMV BLD AUTO: 10 FL
PMV BLD AUTO: 10.1 FL
PMV BLD AUTO: 10.2 FL
PMV BLD AUTO: 10.3 FL
PO2 BLDA: 113 MMHG (ref 80–100)
PO2 BLDA: 130 MMHG (ref 80–100)
POC BE: 0 MMOL/L
POC BE: 2 MMOL/L
POC SATURATED O2: 99 % (ref 95–100)
POC SATURATED O2: 99 % (ref 95–100)
POC TCO2: 25 MMOL/L (ref 23–27)
POC TCO2: 27 MMOL/L (ref 23–27)
POTASSIUM SERPL-SCNC: 4.6 MMOL/L
PROCALCITONIN SERPL IA-MCNC: 0.16 NG/ML
PROT SERPL-MCNC: 8.1 G/DL
PROTHROMBIN TIME: 11.1 SEC
PULM VEIN S/D RATIO: 1.08
PV PEAK D VEL: 0.79 M/S
PV PEAK S VEL: 0.85 M/S
RA MAJOR: 4.67 CM
RA PRESSURE: 3 MMHG
RA WIDTH: 3.63 CM
RBC # BLD AUTO: 3.3 M/UL
RBC # BLD AUTO: 3.32 M/UL
RBC # BLD AUTO: 3.37 M/UL
RBC # BLD AUTO: 3.39 M/UL
RBC # BLD AUTO: 3.42 M/UL
RIGHT VENTRICULAR END-DIASTOLIC DIMENSION: 3.54 CM
RV TISSUE DOPPLER FREE WALL SYSTOLIC VELOCITY 1 (APICAL 4 CHAMBER VIEW): 13.97 M/S
SAMPLE: ABNORMAL
SAMPLE: ABNORMAL
SINUS: 2.6 CM
SITE: ABNORMAL
SITE: ABNORMAL
SODIUM SERPL-SCNC: 135 MMOL/L
SP02: 98
SP02: 99
STJ: 2.04 CM
TDI LATERAL: 0.11
TDI SEPTAL: 0.06
TDI: 0.09
TR MAX PG: 32.72 MMHG
TRICUSPID ANNULAR PLANE SYSTOLIC EXCURSION: 2.21 CM
TROPONIN I SERPL DL<=0.01 NG/ML-MCNC: 0.09 NG/ML
TROPONIN I SERPL DL<=0.01 NG/ML-MCNC: 0.1 NG/ML
TV REST PULMONARY ARTERY PRESSURE: 35.72 MMHG
VT: 500
WBC # BLD AUTO: 13.26 K/UL
WBC # BLD AUTO: 13.28 K/UL
WBC # BLD AUTO: 13.38 K/UL
WBC # BLD AUTO: 14.07 K/UL
WBC # BLD AUTO: 14.28 K/UL

## 2018-11-23 PROCEDURE — 87106 FUNGI IDENTIFICATION YEAST: CPT

## 2018-11-23 PROCEDURE — 80053 COMPREHEN METABOLIC PANEL: CPT

## 2018-11-23 PROCEDURE — 25000003 PHARM REV CODE 250: Performed by: STUDENT IN AN ORGANIZED HEALTH CARE EDUCATION/TRAINING PROGRAM

## 2018-11-23 PROCEDURE — 25000003 PHARM REV CODE 250: Performed by: PSYCHIATRY & NEUROLOGY

## 2018-11-23 PROCEDURE — 83735 ASSAY OF MAGNESIUM: CPT

## 2018-11-23 PROCEDURE — 63600175 PHARM REV CODE 636 W HCPCS: Performed by: NURSE PRACTITIONER

## 2018-11-23 PROCEDURE — 99291 CRITICAL CARE FIRST HOUR: CPT | Mod: GC,,, | Performed by: PSYCHIATRY & NEUROLOGY

## 2018-11-23 PROCEDURE — 95951 HC EEG MONITORING/VIDEO RECORD: CPT

## 2018-11-23 PROCEDURE — 94150 VITAL CAPACITY TEST: CPT

## 2018-11-23 PROCEDURE — 27000221 HC OXYGEN, UP TO 24 HOURS

## 2018-11-23 PROCEDURE — 25000003 PHARM REV CODE 250: Performed by: NURSE PRACTITIONER

## 2018-11-23 PROCEDURE — 95951 PR EEG MONITORING/VIDEORECORD: CPT | Mod: 26,,, | Performed by: PSYCHIATRY & NEUROLOGY

## 2018-11-23 PROCEDURE — 63600175 PHARM REV CODE 636 W HCPCS: Performed by: STUDENT IN AN ORGANIZED HEALTH CARE EDUCATION/TRAINING PROGRAM

## 2018-11-23 PROCEDURE — 20000000 HC ICU ROOM

## 2018-11-23 PROCEDURE — 94002 VENT MGMT INPAT INIT DAY: CPT

## 2018-11-23 PROCEDURE — A4216 STERILE WATER/SALINE, 10 ML: HCPCS | Performed by: NURSE PRACTITIONER

## 2018-11-23 PROCEDURE — 84100 ASSAY OF PHOSPHORUS: CPT

## 2018-11-23 PROCEDURE — 99900026 HC AIRWAY MAINTENANCE (STAT)

## 2018-11-23 PROCEDURE — C9113 INJ PANTOPRAZOLE SODIUM, VIA: HCPCS | Performed by: NURSE PRACTITIONER

## 2018-11-23 PROCEDURE — 85025 COMPLETE CBC W/AUTO DIFF WBC: CPT | Mod: 91

## 2018-11-23 PROCEDURE — 87070 CULTURE OTHR SPECIMN AEROBIC: CPT

## 2018-11-23 PROCEDURE — 99900035 HC TECH TIME PER 15 MIN (STAT)

## 2018-11-23 PROCEDURE — 87205 SMEAR GRAM STAIN: CPT

## 2018-11-23 PROCEDURE — 94761 N-INVAS EAR/PLS OXIMETRY MLT: CPT

## 2018-11-23 PROCEDURE — 82271 OCCULT BLOOD OTHER SOURCES: CPT

## 2018-11-23 PROCEDURE — 85610 PROTHROMBIN TIME: CPT

## 2018-11-23 PROCEDURE — 94010 BREATHING CAPACITY TEST: CPT

## 2018-11-23 PROCEDURE — 85730 THROMBOPLASTIN TIME PARTIAL: CPT

## 2018-11-23 PROCEDURE — 82803 BLOOD GASES ANY COMBINATION: CPT

## 2018-11-23 PROCEDURE — 63600175 PHARM REV CODE 636 W HCPCS: Performed by: PSYCHIATRY & NEUROLOGY

## 2018-11-23 PROCEDURE — C9254 INJECTION, LACOSAMIDE: HCPCS | Performed by: STUDENT IN AN ORGANIZED HEALTH CARE EDUCATION/TRAINING PROGRAM

## 2018-11-23 PROCEDURE — 84484 ASSAY OF TROPONIN QUANT: CPT

## 2018-11-23 PROCEDURE — 99233 SBSQ HOSP IP/OBS HIGH 50: CPT | Mod: GC,,, | Performed by: NEUROLOGICAL SURGERY

## 2018-11-23 PROCEDURE — 84145 PROCALCITONIN (PCT): CPT

## 2018-11-23 PROCEDURE — C9254 INJECTION, LACOSAMIDE: HCPCS | Performed by: PSYCHIATRY & NEUROLOGY

## 2018-11-23 PROCEDURE — 37799 UNLISTED PX VASCULAR SURGERY: CPT

## 2018-11-23 RX ORDER — DEXMEDETOMIDINE HYDROCHLORIDE 4 UG/ML
0.2 INJECTION, SOLUTION INTRAVENOUS CONTINUOUS
Status: DISCONTINUED | OUTPATIENT
Start: 2018-11-23 | End: 2018-11-28

## 2018-11-23 RX ORDER — LEVOTHYROXINE SODIUM 100 UG/1
100 TABLET ORAL
Status: DISCONTINUED | OUTPATIENT
Start: 2018-11-24 | End: 2018-12-04

## 2018-11-23 RX ORDER — DULOXETIN HYDROCHLORIDE 30 MG/1
60 CAPSULE, DELAYED RELEASE ORAL DAILY
Status: DISCONTINUED | OUTPATIENT
Start: 2018-11-23 | End: 2018-11-28

## 2018-11-23 RX ORDER — LEVOTHYROXINE SODIUM 100 UG/1
100 TABLET ORAL DAILY
COMMUNITY

## 2018-11-23 RX ORDER — METOPROLOL TARTRATE 25 MG/1
25 TABLET, FILM COATED ORAL 2 TIMES DAILY
Status: DISCONTINUED | OUTPATIENT
Start: 2018-11-23 | End: 2018-11-25

## 2018-11-23 RX ORDER — HEPARIN SODIUM 5000 [USP'U]/ML
5000 INJECTION, SOLUTION INTRAVENOUS; SUBCUTANEOUS EVERY 8 HOURS
Status: DISCONTINUED | OUTPATIENT
Start: 2018-11-23 | End: 2018-11-28

## 2018-11-23 RX ORDER — PANTOPRAZOLE SODIUM 40 MG/10ML
40 INJECTION, POWDER, LYOPHILIZED, FOR SOLUTION INTRAVENOUS DAILY
Status: DISCONTINUED | OUTPATIENT
Start: 2018-11-23 | End: 2018-11-25

## 2018-11-23 RX ORDER — POLYETHYLENE GLYCOL 3350 17 G/17G
17 POWDER, FOR SOLUTION ORAL DAILY
Status: DISCONTINUED | OUTPATIENT
Start: 2018-11-23 | End: 2018-11-26

## 2018-11-23 RX ORDER — ASPIRIN 325 MG
325 TABLET ORAL DAILY
Status: DISCONTINUED | OUTPATIENT
Start: 2018-11-23 | End: 2018-11-27

## 2018-11-23 RX ORDER — DULOXETIN HYDROCHLORIDE 60 MG/1
60 CAPSULE, DELAYED RELEASE ORAL DAILY
COMMUNITY

## 2018-11-23 RX ORDER — AMLODIPINE BESYLATE 5 MG/1
5 TABLET ORAL DAILY
COMMUNITY

## 2018-11-23 RX ORDER — PROPRANOLOL HYDROCHLORIDE 20 MG/1
20 TABLET ORAL 2 TIMES DAILY
COMMUNITY

## 2018-11-23 RX ORDER — LEVETIRACETAM 15 MG/ML
1500 INJECTION INTRAVASCULAR EVERY 12 HOURS
Status: DISCONTINUED | OUTPATIENT
Start: 2018-11-23 | End: 2018-11-25

## 2018-11-23 RX ORDER — METFORMIN HYDROCHLORIDE 500 MG/1
500 TABLET ORAL 2 TIMES DAILY WITH MEALS
COMMUNITY

## 2018-11-23 RX ORDER — LEVETIRACETAM 750 MG/1
1500 TABLET ORAL 2 TIMES DAILY
COMMUNITY

## 2018-11-23 RX ORDER — LACOSAMIDE 50 MG/1
100 TABLET ORAL EVERY 12 HOURS
Status: DISCONTINUED | OUTPATIENT
Start: 2018-11-23 | End: 2018-11-23

## 2018-11-23 RX ADMIN — SODIUM CHLORIDE: 0.9 INJECTION, SOLUTION INTRAVENOUS at 09:11

## 2018-11-23 RX ADMIN — METOPROLOL TARTRATE 25 MG: 25 TABLET ORAL at 08:11

## 2018-11-23 RX ADMIN — Medication 3 ML: at 02:11

## 2018-11-23 RX ADMIN — POLYETHYLENE GLYCOL 3350 17 G: 17 POWDER, FOR SOLUTION ORAL at 05:11

## 2018-11-23 RX ADMIN — DULOXETINE HYDROCHLORIDE 60 MG: 30 CAPSULE, DELAYED RELEASE ORAL at 12:11

## 2018-11-23 RX ADMIN — ASPIRIN 325 MG ORAL TABLET 325 MG: 325 PILL ORAL at 03:11

## 2018-11-23 RX ADMIN — SODIUM CHLORIDE 300 MG: 9 INJECTION, SOLUTION INTRAVENOUS at 02:11

## 2018-11-23 RX ADMIN — PANTOPRAZOLE SODIUM 40 MG: 40 INJECTION, POWDER, FOR SOLUTION INTRAVENOUS at 08:11

## 2018-11-23 RX ADMIN — CHLORHEXIDINE GLUCONATE 0.12% ORAL RINSE 15 ML: 1.2 LIQUID ORAL at 08:11

## 2018-11-23 RX ADMIN — Medication 3 ML: at 10:11

## 2018-11-23 RX ADMIN — LEVETIRACETAM 1000 MG: 10 INJECTION INTRAVENOUS at 08:11

## 2018-11-23 RX ADMIN — HEPARIN SODIUM 5000 UNITS: 5000 INJECTION, SOLUTION INTRAVENOUS; SUBCUTANEOUS at 03:11

## 2018-11-23 RX ADMIN — LEVETIRACETAM 1500 MG: 15 INJECTION INTRAVENOUS at 08:11

## 2018-11-23 RX ADMIN — DEXMEDETOMIDINE HYDROCHLORIDE 0.2 MCG/KG/HR: 100 INJECTION, SOLUTION, CONCENTRATE INTRAVENOUS at 05:11

## 2018-11-23 RX ADMIN — CHLORHEXIDINE GLUCONATE 0.12% ORAL RINSE 15 ML: 1.2 LIQUID ORAL at 12:11

## 2018-11-23 RX ADMIN — SODIUM CHLORIDE 100 MG: 9 INJECTION, SOLUTION INTRAVENOUS at 09:11

## 2018-11-23 RX ADMIN — SENNOSIDES AND DOCUSATE SODIUM 1 TABLET: 8.6; 5 TABLET ORAL at 08:11

## 2018-11-23 RX ADMIN — CHLORHEXIDINE GLUCONATE 0.12% ORAL RINSE 15 ML: 1.2 LIQUID ORAL at 05:11

## 2018-11-23 RX ADMIN — HEPARIN SODIUM 5000 UNITS: 5000 INJECTION, SOLUTION INTRAVENOUS; SUBCUTANEOUS at 09:11

## 2018-11-23 NOTE — CONSULTS
Inpatient consult to Physical Medicine Rehab  Consult performed by: Jasmina Landers NP  Consult ordered by: Kentrell Smart NP  Reason for consult: assess rehab needs      Patient is intubated and not appropriate for Post-Acute care evaluation at this time.  Will follow medical stability and progress with therapy when appropriate.       LORRIE Duncan, FNP-C  Physical Medicine & Rehabilitation   11/23/2018  Spectralink: [id number]

## 2018-11-23 NOTE — PT/OT/SLP PROGRESS
Occupational Therapy      Patient Name:  Melania Malagon   MRN:  [id number]    OT orders d/c at this time. Pt with cervical fracture- brace in place. She is currently intubated.   Please re consult once extubated and appropriate for skilled OT services.   Pt with nursing orders for range of motion and progressive mobility.   Please re consult when appropriate for skilled OT services.    RADHA Bruce  11/23/2018

## 2018-11-23 NOTE — SUBJECTIVE & OBJECTIVE
Interval History:  No acute adverse events overnight    Review of Systems  Unable to obtain a complete ROS due to level of consciousness.  Objective:     Vitals:  Temp: 99 °F (37.2 °C)  Pulse: 86  Rhythm: normal sinus rhythm  BP: (!) 152/72  MAP (mmHg): 103  Resp: 19  SpO2: 99 %  Oxygen Concentration (%): 41  O2 Device (Oxygen Therapy): ventilator  Vent Mode: A/C  Set Rate: 15 bmp  Vt Set: 500 mL  PEEP/CPAP: 5 cmH20  Peak Airway Pressure: 30 cmH2O  Mean Airway Pressure: 10 cmH20  Plateau Pressure: 0 cmH20    Temp  Min: 98 °F (36.7 °C)  Max: 99.2 °F (37.3 °C)  Pulse  Min: 81  Max: 121  BP  Min: 110/90  Max: 166/58  MAP (mmHg)  Min: 86  Max: 103  Resp  Min: 15  Max: 31  SpO2  Min: 95 %  Max: 100 %  Oxygen Concentration (%)  Min: 39  Max: 100    11/22 0701 - 11/23 0700  In: 100   Out: 250 [Urine:250]   Unmeasured Output  Urine Occurrence: 1  Pad Count: 1       Physical Exam   Constitutional: She appears well-developed and well-nourished. She appears lethargic.   HENT:   Kayy-orbital trauma   Eyes: EOM are normal. Pupils are equal, round, and reactive to light.   Neck:   C-collar in place   Cardiovascular: Normal rate.   Pulmonary/Chest:   Mechanical BS throughout   Abdominal: Soft. She exhibits no distension.   Neurological: She appears lethargic. No cranial nerve deficit or sensory deficit. GCS eye subscore is 3. GCS verbal subscore is 1. GCS motor subscore is 6.   BL LE weakness  Following commands throughout  C-collar in place         Medications:  Continuous  sodium chloride 0.9% Last Rate: 75 mL/hr at 11/23/18 1005   Scheduled  chlorhexidine 15 mL QID   DULoxetine 60 mg Daily   levetiracetam IVPB 1,500 mg Q12H   [START ON 11/24/2018] levothyroxine 100 mcg Before breakfast   pantoprazole 40 mg Daily   senna-docusate 8.6-50 mg 1 tablet Daily   sodium chloride 0.9% 3 mL Q8H   PRN  albuterol-ipratropium 3 mL Q6H PRN   magnesium oxide 800 mg PRN   magnesium oxide 800 mg PRN   midazolam 2 mg Q5 Min PRN   ondansetron 4  mg Q8H PRN   potassium chloride 10% 40 mEq PRN   potassium chloride 10% 40 mEq PRN   potassium chloride 10% 60 mEq PRN   potassium, sodium phosphates 2 packet PRN   potassium, sodium phosphates 2 packet PRN   potassium, sodium phosphates 2 packet PRN   sodium chloride 0.9% 3 mL PRN         Diet  Diet NPO  Diet NPO

## 2018-11-23 NOTE — ASSESSMENT & PLAN NOTE
Seizures at OSH  -received versed and IV fosphenytoin at OSH. Started on ketamine gtt 0.5 mcg/kg/min en route in helicopter  -On arrival to [facility], loaded with keppra 2 g IV and maintenance keppra 1 g IV BID  -continous EEG monitoring without epileptiform activity thus far  -Epilepsy consulted

## 2018-11-23 NOTE — CONSULTS
"  [facility] [address]  Adult Nutrition  Consult Note    SUMMARY     Recommendations    Recommendation/Intervention:   1. If unable to extubate >72hrs, recommend starting TF.   Glucerna 1.5 @ goal rate 45mL/hr.   - Initiate @ 15mL/hr and increase by 10mL q4hrs, or as tolerated, until goal rate is reached.   - Hold for residuals >500mL.   - Provides 1620kcals, 89g protein and 820mL free water.     2. If able to extubate and advance diet, recommend Diabetic with texture per SLP recommendations.     RD to monitor.    Goals: Pt to receive nutrition by RD follow up  Nutrition Goal Status: new  Communication of RD Recs: discussed on rounds    Reason for Assessment    Reason for Assessment: consult  Diagnosis: seizures  Relevant Medical History: HTN, siezures, DM  Interdisciplinary Rounds: attended  General Information Comments: Pt intubated. No family at bedside. Pt appears nourished, no physical signs of malnutrition at this time. DIEGO nutrition hx.   Nutrition Discharge Planning: unable to determine at this time.    Nutrition Risk Screen         Nutrition/Diet History         Anthropometrics    Temp: 99 °F (37.2 °C)  Height Method: Estimated  Height: 5' 3" (160 cm)  Height (inches): 63 in  Weight Method: (copied from 11/22/18 09:33 entry)  Weight: 84.4 kg (186 lb 1.1 oz)  Weight (lb): 186.07 lb  Ideal Body Weight (IBW), Female: 115 lb  % Ideal Body Weight, Female (lb): 161.8 lb  BMI (Calculated): 33  BMI Grade: 30 - 34.9- obesity - grade I       Lab/Procedures/Meds    Pertinent Labs Reviewed: reviewed  Pertinent Labs Comments: HgbA1c 6.0, POCT Glu 138, Na 135  Pertinent Medications Reviewed: reviewed  Pertinent Medications Comments: IVF, keppra    Physical Findings/Assessment    Overall Physical Appearance: nourished, obese, on ventilator support  Tubes: orogastric tube  Skin: intact    Estimated/Assessed Needs    Weight Used For Calorie Calculations: 84.4 kg (186 lb 1.1 oz)  Energy Calorie Requirements (kcal): " 1579  Energy Need Method: [place] (modified)  Protein Requirements: 79-105g(1.5-2.0g/kg)  Weight Used For Protein Calculations: 52.3 kg (115 lb 4.8 oz)  Fluid Requirements (mL): 1mL/kcal or per MD     RDA Method (mL): 1579  CHO Requirement: 50% of total kcals      Nutrition Prescription Ordered    Current Diet Order: NPO    Evaluation of Received Nutrient/Fluid Intake    IV Fluid (mL): 1800  I/O: +I/O, good UOP, LBM 11/22  % Intake of Estimated Energy Needs: 0 - 25 %  % Meal Intake: NPO    Nutrition Risk    Level of Risk/Frequency of Follow-up: (f/u 2x/week)     Assessment and Plan    Nutrition Problem  Inadequate energy intake    Related to (etiology):   NPO    Signs and Symptoms (as evidenced by):   Pt receiving <85% EEN and EPN.     Intervention:  Initiate enteral nutrition or carbohydrate consistent diet pending medical course    Nutrition Diagnosis Status:   New      Monitor and Evaluation    Food and Nutrient Intake: energy intake, food and beverage intake, enteral nutrition intake  Food and Nutrient Adminstration: diet order, enteral and parenteral nutrition administration  Anthropometric Measurements: weight, weight change, body mass index  Biochemical Data, Medical Tests and Procedures: electrolyte and renal panel, gastrointestinal profile, glucose/endocrine profile, inflammatory profile, lipid profile  Nutrition-Focused Physical Findings: overall appearance     Nutrition Follow-Up    RD Follow-up?: Yes

## 2018-11-23 NOTE — SUBJECTIVE & OBJECTIVE
Medications:  Continuous Infusions:   sodium chloride 0.9% 75 mL/hr at 11/23/18 1005     Scheduled Meds:   chlorhexidine  15 mL Mouth/Throat QID    DULoxetine  60 mg Per NG tube Daily    levetiracetam IVPB  1,500 mg Intravenous Q12H    [START ON 11/24/2018] levothyroxine  100 mcg Per NG tube Before breakfast    pantoprazole  40 mg Intravenous Daily    senna-docusate 8.6-50 mg  1 tablet Per NG tube Daily    sodium chloride 0.9%  3 mL Intravenous Q8H     PRN Meds:albuterol-ipratropium, magnesium oxide, magnesium oxide, midazolam, ondansetron, potassium chloride 10%, potassium chloride 10%, potassium chloride 10%, potassium, sodium phosphates, potassium, sodium phosphates, potassium, sodium phosphates, sodium chloride 0.9%     Review of Systems  Objective:     Weight: 84.4 kg (186 lb 1.1 oz)  Body mass index is 32.96 kg/m².  Vital Signs (Most Recent):  Temp: 99 °F (37.2 °C) (11/23/18 0705)  Pulse: 86 (11/23/18 1005)  Resp: 19 (11/23/18 1005)  BP: (!) 152/72 (11/23/18 1005)  SpO2: 99 % (11/23/18 1005) Vital Signs (24h Range):  Temp:  [98 °F (36.7 °C)-99.2 °F (37.3 °C)] 99 °F (37.2 °C)  Pulse:  [] 86  Resp:  [15-31] 19  SpO2:  [95 %-100 %] 99 %  BP: (110-166)/(58-90) 152/72  Arterial Line BP: (110-180)/(55-90) 157/56     Date 11/23/18 0700 - 11/24/18 0659   Shift 2670-0511 5404-4365 3994-5146 24 Hour Total   INTAKE   I.V.(mL/kg) 1282.5(15.2)   1282.5(15.2)   Shift Total(mL/kg) 1282.5(15.2)   1282.5(15.2)   OUTPUT   Urine(mL/kg/hr) 100   100   Shift Total(mL/kg) 100(1.2)   100(1.2)   Weight (kg) 84.4 84.4 84.4 84.4              Vent Mode: A/C  Oxygen Concentration (%):  [] 41  Resp Rate Total:  [15 br/min-24 br/min] 16 br/min  Vt Set:  [500 mL] 500 mL  PEEP/CPAP:  [5 cmH20] 5 cmH20  Mean Airway Pressure:  [6.7 cmH20-10 cmH20] 10 cmH20         NG/OG Tube 11/22/18 Right mouth (Active)   Placement Check placement verified by x-ray;physician notified 11/23/2018  7:05 AM   Distal Tube Length (cm) 55  11/23/2018  7:05 AM   Tolerance no signs/symptoms of discomfort 11/23/2018  7:05 AM   Securement taped to commercial device 11/23/2018  7:05 AM   Clamp Status/Tolerance clamped 11/23/2018  7:05 AM   Insertion Site Appearance no redness, warmth, tenderness, skin breakdown, drainage 11/23/2018  7:05 AM       Female External Urinary Catheter 11/22/18 1900 (Active)   Skin reddened;perineum cleansed w/ soap and water 11/23/2018  7:05 AM   Tolerance no signs/symptoms of discomfort 11/23/2018  7:05 AM   Suction Continuous suction at 60 mmHg 11/23/2018  7:05 AM   Date of last wick change 11/23/18 11/23/2018  7:05 AM   Time of last wick change 0705 11/23/2018  7:05 AM   Output (mL) 100 mL 11/23/2018  7:05 AM       Neurosurgery Physical Exam    .stable    Significant Labs:  Recent Labs   Lab 11/22/18  1339 11/23/18  0225   * 140*    135*   K 4.8 4.6    108   CO2 20* 19*   BUN 12 10   CREATININE 1.1 0.9   CALCIUM 8.6* 8.1*   MG 2.3 2.7*     Recent Labs   Lab 11/22/18  2341 11/23/18  0225 11/23/18  0600   WBC 14.28* 13.38* 14.07*   HGB 7.7* 7.7* 7.6*   HCT 27.1* 27.1* 26.4*    340 353*     Recent Labs   Lab 11/22/18 1339 11/23/18  0225   INR 1.1 1.1   APTT 25.6 25.6     Microbiology Results (last 7 days)     Procedure Component Value Units Date/Time    Culture, Respiratory with Gram Stain [[id number]] Collected:  11/23/18 1132    Order Status:  Sent Specimen:  Respiratory from Endotracheal Aspirate Updated:  11/23/18 1132    Blood culture (site 1) [[id number]] Collected:  11/22/18 1712    Order Status:  Completed Specimen:  Blood from Peripheral, Antecubital, Left Updated:  11/23/18 0715     Blood Culture, Routine No Growth to date    Narrative:       Site # 1, aerobic and anaerobic    Blood culture (site 2) [[id number]] Collected:  11/22/18 1724    Order Status:  Completed Specimen:  Blood from Peripheral, Wrist, Right Updated:  11/23/18 0715     Blood Culture, Routine No Growth to date    Narrative:        Site # 2, aerobic only    Culture, Respiratory with Gram Stain [[id number]] Collected:  11/22/18 1520    Order Status:  Completed Specimen:  Respiratory from Sputum Updated:  11/23/18 0106     Gram Stain (Respiratory) <10 epithelial cells per low power field.     Gram Stain (Respiratory) Few WBC's     Gram Stain (Respiratory) No organisms seen    Blood culture [[id number]]     Order Status:  Canceled Specimen:  Blood     Blood culture [[id number]]     Order Status:  Canceled Specimen:  Blood         All pertinent labs from the last 24 hours have been reviewed.    Significant Diagnostics:  I have reviewed all pertinent imaging results/findings within the past 24 hours.

## 2018-11-23 NOTE — PLAN OF CARE
Problem: Patient Care Overview  Goal: Plan of Care Review  Outcome: Ongoing (interventions implemented as appropriate)  Nutrition assessment completed. Please see RD note for details.    Recommendation/Intervention:   1. If unable to extubate >72hrs, recommend starting TF.   Glucerna 1.5 @ goal rate 45mL/hr.   - Initiate @ 15mL/hr and increase by 10mL q4hrs, or as tolerated, until goal rate is reached.   - Hold for residuals >500mL.   - Provides 1620kcals, 89g protein and 820mL free water.     2. If able to extubate and advance diet, recommend Diabetic with texture per SLP recommendations.     RD to monitor.    Goals: Pt to receive nutrition by RD follow up  Nutrition Goal Status: new  Communication of RD Recs: discussed on rounds

## 2018-11-23 NOTE — PLAN OF CARE
Problem: Patient Care Overview  Goal: Plan of Care Review  Outcome: Ongoing (interventions implemented as appropriate)  POC reviewed with pt's family at 1600. Pt's daughter verbalized understanding. Questions and concerns addressed. Took patient to MRI, no complications on trip. Pt is going to be put on EEG cap tonight. Was told to keep MAP's above 80. CT is scheduled for tomorrow morning. Pt progressing toward goals. Will continue to monitor. See flowsheets for full assessment and VS info.

## 2018-11-23 NOTE — PLAN OF CARE
Problem: Patient Care Overview  Goal: Plan of Care Review  Outcome: Ongoing (interventions implemented as appropriate)  POC reviewed with pt at 0500. Pt unable to verbalize understanding due to intubation. Trending CBC q6; HGB decreasing. Restraints applied d/t pt pulling at ETT. Will continue to monitor. See flowsheets for full assessment and VS info

## 2018-11-23 NOTE — PROCEDURES
ICU EEG/VIDEO MONITORING REPORT    Melania Malagon  [id number]  1945    DATE OF SERVICE: 11/22-23/18    DATE OF ADMISSION: 11/22/2018  1:27 PM    ADMITTING PROVIDER: Ernesto Albarran MD    REASON FOR CONSULT: 74yo F admitted with seizures and AMS    METHODOLOGY- cap   Electroencephalographic (EEG) recording is with electrodes placed according to the International 10-20 placement system.  Thirty two (32) channels of digital signal are simultaneously recorded from the scalp and may include EKG, EMG, and/or eye monitors.   Recording band pass was 0.1 to 512 hz.  Digital video recording of the patient is simultaneously recorded with the EEG.  The nursing staff report clinical symptoms and may press an event button when the patient has symptoms of clinical interest to the treating physicians.  EEG and video recording is stored and archived in digital format.  The entire recording is visually reviewed and the times identified by computer analysis as being spikes or seizures are reviewed again.  Activation procedures which include photic stimulation, hyperventilation and instructing patients to perform simple task are done in selected patients.   Compresses spectral analysis (CSA) is also performed on the activity recorded from each individual channel.  This is displayed as a power display of frequencies from 0 to 30 Hz over time.   The CSA analysis is done and displayed continuously.  This is reviewed for asymmetries in power between homologous areas of the scalp and for presence of changes in power which canbe seen when seizures occur.  Sections of suspected abnormalities on the CSA is then compared with the original EEG recording.     T[employer] software was also utilized in the review of this study.  This software suite analyzes the EEG recording in multiple domains.  Coherence and rhythmicity is computed to identify EEG sections which may contain organized seizures.  Each channel undergoes analysis to detect presence  of spike and sharp waves which have special and morphological characteristic of epileptic activity.  The routine EEG recording is converted from spacial into frequency domain.  This is then displayed comparing homologous areas to identify areas of significant asymmetry.  Algorithm to identify non-cortically generated artifact is used to separate eye movement, EMG and other artifact from the EEG.      Recording Times  Start on 11/22/18, 19:30  Stop on 11/23/18, 08:38    A total of 13 hours and 8 minutes of EEG was recorded.    EEG FINDINGS - cap significantly obscured by artifacts, maximal on the left; mostly dislodged in the latter parts of the study  Background activity:   The background rhythm was characterized by sharply contoured delta-theta (2-5 Hz) activity - only discernable on the right; left side is completely obscured by artifiacts  No posterior dominant rhythm seen.   Symmetry and continuity: the background appeared continuous     Sleep:   Not seen.    Activation procedures:   Not done    Abnormal activity:   Frequent to abundant right sided sharp waves are seen that become periodic and last for prolonged duration at times, when interpretable.  Unable to comment about activity on the left due to electrode artifacts    IMPRESSION:   The study was not available for review until AM of 11/23/18 due to technical issues  This is an abnormal Cap-EEG - in the interpretable portions - due to:  1) right sided abundant epileptiform periodic discharges seen, suggestive of possible ictal-inter ictal continuum  2) severe generalized slowing seen,suggestive of severe diffuse or multifocal cerebral dysfunction    CLINICAL CORRELATION IS RECOMMENDED.    Marti Tsai MD, VALORIE(), TEDDY, TRUPTI.  Neurology-Epilepsy.  [facility].

## 2018-11-23 NOTE — PROGRESS NOTES
Pt transferred to CT  @ 0300 with 2 rns and resp therapist; portable monitor; portable vent; ambu bag. Pt tolerated CT. VSS. Pt returned to rm [id number] @ 0325. Will continue to monitor.

## 2018-11-23 NOTE — PT/OT/SLP PROGRESS
Physical Therapy      Patient Name:  Melania Malgaon   MRN:  [id number]    PT orders discontinued 2/2 pt remains intubated and not appropriate for therapy evaluation at this time. Please re-consult when pt is medically appropriate to participate in EOB/OOB activity.     Danna Colón, PT, DPT   11/23/2018

## 2018-11-23 NOTE — ASSESSMENT & PLAN NOTE
Vent Mode: A/C  Oxygen Concentration (%):  [] 41  Resp Rate Total:  [15 br/min-24 br/min] 16 br/min  Vt Set:  [500 mL] 500 mL  PEEP/CPAP:  [5 cmH20] 5 cmH20  Mean Airway Pressure:  [6.7 cmH20-10 cmH20] 10 cmH20  Abg, cxr  Vent weaning today

## 2018-11-23 NOTE — PROGRESS NOTES
[facility]  Neurocritical Care  Progress Note    Admit Date: 11/22/2018  Service Date: 11/23/2018  Length of Stay: 1    Subjective:     Chief Complaint: <principal problem not specified>    History of Present Illness: 73 y F transferred from [place] [facility] for cervical spine injury and seizure activity. She has pmh of HTN, DM, seizure disorder and had an unwitnessed seizure (presumed) at nursing home today followed by fall. On arrival by EMS she was responding minimally. Did not require CPR. She had one seizure en route to hospital at [facility] and received versed. At ER at OSH, patient had another witnessed seizure with tonic clonic activity in extremities and became unresponsive. She was intubated, and given iv fosphenytoin.  CTH was negative. CT c-spine showed odontoid fracture and C1 fracture. She received versed and fosphenytoin and OSH, when she was transferred by helicopter to [facility], en route she was started on ketamine gtt at 0.5 mcg/kg/min. She was transferred to [facility] for cervical fracture requiring neurosurg intervention and also seizure monitoring and control.      TO note  Patient had recent pneumonia and GI bleed earlier this month and was treated at OSH. She was discharged on 11/20 with levaquin for pneumonia and returned to NH     Hospital Course: 11/22: admit to Neuro ICU for higher level of care. Neurosurgery consulted for C-spine fracture. MRI c-spine ordered. Continous EEG monitoring. Epilepsy consulted. Loaded keppra and maintenance keppra    Interval History:  No acute adverse events overnight    Review of Systems  Unable to obtain a complete ROS due to level of consciousness.  Objective:     Vitals:  Temp: 99 °F (37.2 °C)  Pulse: 86  Rhythm: normal sinus rhythm  BP: (!) 152/72  MAP (mmHg): 103  Resp: 19  SpO2: 99 %  Oxygen Concentration (%): 41  O2 Device (Oxygen Therapy): ventilator  Vent Mode: A/C  Set Rate: 15 bmp  Vt Set: 500 mL  PEEP/CPAP: 5 cmH20  Peak Airway Pressure: 30  cmH2O  Mean Airway Pressure: 10 cmH20  Plateau Pressure: 0 cmH20    Temp  Min: 98 °F (36.7 °C)  Max: 99.2 °F (37.3 °C)  Pulse  Min: 81  Max: 121  BP  Min: 110/90  Max: 166/58  MAP (mmHg)  Min: 86  Max: 103  Resp  Min: 15  Max: 31  SpO2  Min: 95 %  Max: 100 %  Oxygen Concentration (%)  Min: 39  Max: 100    11/22 0701 - 11/23 0700  In: 100   Out: 250 [Urine:250]   Unmeasured Output  Urine Occurrence: 1  Pad Count: 1       Physical Exam   Constitutional: She appears well-developed and well-nourished. She appears lethargic.   HENT:   Kayy-orbital trauma   Eyes: EOM are normal. Pupils are equal, round, and reactive to light.   Neck:   C-collar in place   Cardiovascular: Normal rate.   Pulmonary/Chest:   Mechanical BS throughout   Abdominal: Soft. She exhibits no distension.   Neurological: She appears lethargic. No cranial nerve deficit or sensory deficit. GCS eye subscore is 3. GCS verbal subscore is 1. GCS motor subscore is 6.   BL LE weakness  Following commands throughout  C-collar in place         Medications:  Continuous  sodium chloride 0.9% Last Rate: 75 mL/hr at 11/23/18 1005   Scheduled  chlorhexidine 15 mL QID   DULoxetine 60 mg Daily   levetiracetam IVPB 1,500 mg Q12H   [START ON 11/24/2018] levothyroxine 100 mcg Before breakfast   pantoprazole 40 mg Daily   senna-docusate 8.6-50 mg 1 tablet Daily   sodium chloride 0.9% 3 mL Q8H   PRN  albuterol-ipratropium 3 mL Q6H PRN   magnesium oxide 800 mg PRN   magnesium oxide 800 mg PRN   midazolam 2 mg Q5 Min PRN   ondansetron 4 mg Q8H PRN   potassium chloride 10% 40 mEq PRN   potassium chloride 10% 40 mEq PRN   potassium chloride 10% 60 mEq PRN   potassium, sodium phosphates 2 packet PRN   potassium, sodium phosphates 2 packet PRN   potassium, sodium phosphates 2 packet PRN   sodium chloride 0.9% 3 mL PRN         Diet  Diet NPO  Diet NPO        Assessment/Plan:     Neuro   Marlen coma scale total score 9-12    multifactorial     Seizures    Seizures at  OSH  -received versed and IV fosphenytoin at OSH. Started on ketamine gtt 0.5 mcg/kg/min en route in helicopter  -On arrival to [facility], loaded with keppra 2 g IV and maintenance keppra 1 g IV BID  -continous EEG monitoring without epileptiform activity thus far  -Epilepsy consulted     Cervical spine fracture    MRI c-spine without cord compression, contusion or hematoma  CT c-spine with minimally displaced fractures of the posterior arch of the C1 vertebral body in keeping with type 1 Sidney fracture and Type 2 odontoid fracture  C-collar in place  NSGY consulted         Oncology   Leukocytosis    Pan cx  CXR  Hold off on abx for now       Other   Endotracheally intubated    Vent Mode: A/C  Oxygen Concentration (%):  [] 41  Resp Rate Total:  [15 br/min-24 br/min] 16 br/min  Vt Set:  [500 mL] 500 mL  PEEP/CPAP:  [5 cmH20] 5 cmH20  Mean Airway Pressure:  [6.7 cmH20-10 cmH20] 10 cmH20  Abg, cxr  Vent weaning today           The patient is being Prophylaxed for:  Venous Thromboembolism with: Mechanical  Stress Ulcer with: PPI  Ventilator Pneumonia with: chlorhexidine oral care    Activity Orders          None        Full Code    Bakari Badillo MD  Neurocritical Care  [facility]

## 2018-11-23 NOTE — CONSULTS
Please see Dr. Tsai's procedure note from today at 1:07pm for EEG read.  If full consult note needed, please call Epilepsy team.    Kaylin Fernando MD  [facility]  PGY-4

## 2018-11-23 NOTE — ASSESSMENT & PLAN NOTE
MRI c-spine without cord compression, contusion or hematoma  CT c-spine with minimally displaced fractures of the posterior arch of the C1 vertebral body in keeping with type 1 Sidney fracture and Type 2 odontoid fracture  C-collar in place  NSGY consulted

## 2018-11-23 NOTE — PROGRESS NOTES
[facility]  Neurosurgery  Progress Note    Subjective:     History of Present Illness: Melania Malagon is a 73 y.o. year old female with PMHx of HTN, DM, seizure disorder who lives in [place] and [place] [facility] [place] and cervical frx for care escalation. Unable to obtain hx due to AMS. Per charting, she had seizure at NH followed by mechanical fall. At OSH, she was intubated for airway protection and loaded with AED. Lab was significant for leucocytosis, lactic acidosis , and elevated ammonia. CT head was negative for acute process. C spine CT shows minimally displaced type 1 samuel frx and type 2 odontoid frx without retropulsion or canal compromise. She is on ASA per charting. NSGY consulted for further evaluation and possible intervention.    Post-Op Info:  * No surgery found *           Medications:  Continuous Infusions:   sodium chloride 0.9% 75 mL/hr at 11/23/18 1005     Scheduled Meds:   chlorhexidine  15 mL Mouth/Throat QID    DULoxetine  60 mg Per NG tube Daily    levetiracetam IVPB  1,500 mg Intravenous Q12H    [START ON 11/24/2018] levothyroxine  100 mcg Per NG tube Before breakfast    pantoprazole  40 mg Intravenous Daily    senna-docusate 8.6-50 mg  1 tablet Per NG tube Daily    sodium chloride 0.9%  3 mL Intravenous Q8H     PRN Meds:albuterol-ipratropium, magnesium oxide, magnesium oxide, midazolam, ondansetron, potassium chloride 10%, potassium chloride 10%, potassium chloride 10%, potassium, sodium phosphates, potassium, sodium phosphates, potassium, sodium phosphates, sodium chloride 0.9%     Review of Systems  Objective:     Weight: 84.4 kg (186 lb 1.1 oz)  Body mass index is 32.96 kg/m².  Vital Signs (Most Recent):  Temp: 99 °F (37.2 °C) (11/23/18 0705)  Pulse: 86 (11/23/18 1005)  Resp: 19 (11/23/18 1005)  BP: (!) 152/72 (11/23/18 1005)  SpO2: 99 % (11/23/18 1005) Vital Signs (24h Range):  Temp:  [98 °F (36.7 °C)-99.2 °F (37.3 °C)] 99 °F (37.2 °C)  Pulse:  []  86  Resp:  [15-31] 19  SpO2:  [95 %-100 %] 99 %  BP: (110-166)/(58-90) 152/72  Arterial Line BP: (110-180)/(55-90) 157/56     Date 11/23/18 0700 - 11/24/18 0659   Shift 6334-9332 2904-5842 5442-5404 24 Hour Total   INTAKE   I.V.(mL/kg) 1282.5(15.2)   1282.5(15.2)   Shift Total(mL/kg) 1282.5(15.2)   1282.5(15.2)   OUTPUT   Urine(mL/kg/hr) 100   100   Shift Total(mL/kg) 100(1.2)   100(1.2)   Weight (kg) 84.4 84.4 84.4 84.4              Vent Mode: A/C  Oxygen Concentration (%):  [] 41  Resp Rate Total:  [15 br/min-24 br/min] 16 br/min  Vt Set:  [500 mL] 500 mL  PEEP/CPAP:  [5 cmH20] 5 cmH20  Mean Airway Pressure:  [6.7 cmH20-10 cmH20] 10 cmH20         NG/OG Tube 11/22/18 Right mouth (Active)   Placement Check placement verified by x-ray;physician notified 11/23/2018  7:05 AM   Distal Tube Length (cm) 55 11/23/2018  7:05 AM   Tolerance no signs/symptoms of discomfort 11/23/2018  7:05 AM   Securement taped to commercial device 11/23/2018  7:05 AM   Clamp Status/Tolerance clamped 11/23/2018  7:05 AM   Insertion Site Appearance no redness, warmth, tenderness, skin breakdown, drainage 11/23/2018  7:05 AM       Female External Urinary Catheter 11/22/18 1900 (Active)   Skin reddened;perineum cleansed w/ soap and water 11/23/2018  7:05 AM   Tolerance no signs/symptoms of discomfort 11/23/2018  7:05 AM   Suction Continuous suction at 60 mmHg 11/23/2018  7:05 AM   Date of last wick change 11/23/18 11/23/2018  7:05 AM   Time of last wick change 0705 11/23/2018  7:05 AM   Output (mL) 100 mL 11/23/2018  7:05 AM       Neurosurgery Physical Exam    .stable    Significant Labs:  Recent Labs   Lab 11/22/18  1339 11/23/18  0225   * 140*    135*   K 4.8 4.6    108   CO2 20* 19*   BUN 12 10   CREATININE 1.1 0.9   CALCIUM 8.6* 8.1*   MG 2.3 2.7*     Recent Labs   Lab 11/22/18  2341 11/23/18  0225 11/23/18  0600   WBC 14.28* 13.38* 14.07*   HGB 7.7* 7.7* 7.6*   HCT 27.1* 27.1* 26.4*    340 353*     Recent  Labs   Lab 11/22/18  1339 11/23/18  0225   INR 1.1 1.1   APTT 25.6 25.6     Microbiology Results (last 7 days)     Procedure Component Value Units Date/Time    Culture, Respiratory with Gram Stain [[id number]] Collected:  11/23/18 1132    Order Status:  Sent Specimen:  Respiratory from Endotracheal Aspirate Updated:  11/23/18 1132    Blood culture (site 1) [[id number]] Collected:  11/22/18 1712    Order Status:  Completed Specimen:  Blood from Peripheral, Antecubital, Left Updated:  11/23/18 0715     Blood Culture, Routine No Growth to date    Narrative:       Site # 1, aerobic and anaerobic    Blood culture (site 2) [[id number]] Collected:  11/22/18 1724    Order Status:  Completed Specimen:  Blood from Peripheral, Wrist, Right Updated:  11/23/18 0715     Blood Culture, Routine No Growth to date    Narrative:       Site # 2, aerobic only    Culture, Respiratory with Gram Stain [[id number]] Collected:  11/22/18 1520    Order Status:  Completed Specimen:  Respiratory from Sputum Updated:  11/23/18 0106     Gram Stain (Respiratory) <10 epithelial cells per low power field.     Gram Stain (Respiratory) Few WBC's     Gram Stain (Respiratory) No organisms seen    Blood culture [[id number]]     Order Status:  Canceled Specimen:  Blood     Blood culture [[id number]]     Order Status:  Canceled Specimen:  Blood         All pertinent labs from the last 24 hours have been reviewed.    Significant Diagnostics:  I have reviewed all pertinent imaging results/findings within the past 24 hours.    Assessment/Plan:     Cervical spine fracture    72 yo female admitted to [facility] with NCSE also found to have type I atlas and type II odontoid fractures.    No acute events overnight. Pt is on cEEG for seizure identification and control per primary team. MRI confirms acute atlantoaxial trauma. No central canal compromise. No traumatic disk extrusion or herniation. No extraaxial hematoma.       --Continue care per primary team.  --Continue AED  regimen per epilepsy and primary team.  --Continue cervical orthosis in rigid collar at all times.  --[place] fracture is stable, odontoid fracture is unstable may be amenable to surgical intervention once pt as been medical optimized and has adequate seizure control.  --We will continue to monitor closely, please contact us with any questions or concerns.           Ton Barry MD  Neurosurgery  [facility]

## 2018-11-23 NOTE — HPI
Melania Malagon is a 73-year-old female with PMHx of HTN, DM, seizure disorder.  She recently recovered from a GI bleed and PNA earlier this month.  Patient presented to OSH with seizure activity. Patient had one seizure en route to OSH and received versed and had witnessed seizure in ED with tonic clonic activity and became unresponsive. She was administered another dose of Versed, intubated, and IV Fosphenytoin.  CTH revealed no acute pathology  CT C-spine revealed odontoid fracture and C1 fracture. [place] [facility] [place] further evaluation and management.  Upon admission, CTA revealed***.  MRI brain revealed ***.  Not an IR candidate/IR intervention***. Hospital course complicated by ***.     Functional History: Patient lives in *** with *** in a single story home with *** to enter.  Prior to admission, (I) with ADLs and mobility***.  DME: ***.

## 2018-11-23 NOTE — PLAN OF CARE
11/23/18 1640   Discharge Assessment   Assessment Type Discharge Planning Assessment   Confirmed/corrected address and phone number on facesheet? Yes   Assessment information obtained from? Caregiver  (daughter)   Expected Length of Stay (days) 4   Communicated expected length of stay with patient/caregiver yes   Prior to hospitilization cognitive status: Alert/Oriented   Prior to hospitalization functional status: Needs Assistance;Wheelchair Bound   Current cognitive status: Unable to Assess   Current Functional Status: Completely Dependent   Lives With facility resident  ([facility] )   Able to Return to Prior Arrangements unable to determine at this time (comments)   Is patient able to care for self after discharge? Unable to determine at this time (comments)   Who are your caregiver(s) and their phone number(s)? Christina Escoto (dtr)  [phone]   Patient's perception of discharge disposition other (comments)  (mitchel)   Readmission Within The Last 30 Days no previous admission in last 30 days   Patient currently being followed by outpatient case management? No   Patient currently receives any other outside agency services? No   Equipment Currently Used at Home wheelchair   Do you have any problems affording any of your prescribed medications? No   Is the patient taking medications as prescribed? yes   Does the patient have transportation home? Yes   Transportation Available family or friend will provide   Does the patient receive services at the [facility]? No   Discharge Plan A Return to nursing home   Discharge Plan B Return to Nursing Home   Patient/Family In Agreement With Plan yes   Readmission Questionnaire   Have you felt down, depressed, or hopeless? Unable to Assess   Have you felt little interest or pleasure in doing things? Unable to assess           PCP:   Eugene Lorenzo at [facility]       Pharmacy:  Per [facility]     Emergency Contacts:  Extended Emergency Contact  Information  Primary Emergency Contact: Christina Escoto  Mobile Phone: [phone]  Relation: Daughter    Insurance:  Payor: MEDICARE / Plan: MEDICARE PART A & B / Product Type: Government /     Mandy Clement RN, CCRN-K, [facility]  Neuro-Critical Care   [phone]

## 2018-11-23 NOTE — PLAN OF CARE
Problem: Patient Care Overview  Goal: Plan of Care Review  Outcome: Ongoing (interventions implemented as appropriate)  POC reviewed with pt and family at 1400. Pt's daughter verbalized understanding. Questions and concerns addressed. EEG placed on pt. Pt was also given vimpat for seizure activity. Starting on precedex for comfort. OGT was replaced due to previous tube being clogged. Pt progressing toward goals. Will continue to monitor. See flowsheets for full assessment and VS info.

## 2018-11-24 LAB
ALBUMIN SERPL BCP-MCNC: 2.5 G/DL
ALLENS TEST: ABNORMAL
ALP SERPL-CCNC: 64 U/L
ALT SERPL W/O P-5'-P-CCNC: 20 U/L
ANION GAP SERPL CALC-SCNC: 8 MMOL/L
AST SERPL-CCNC: 52 U/L
BASOPHILS # BLD AUTO: 0.05 K/UL
BASOPHILS # BLD AUTO: 0.07 K/UL
BASOPHILS # BLD AUTO: 0.08 K/UL
BASOPHILS NFR BLD: 0.5 %
BASOPHILS NFR BLD: 0.6 %
BASOPHILS NFR BLD: 0.7 %
BASOPHILS NFR BLD: 0.7 %
BASOPHILS NFR BLD: 0.8 %
BILIRUB SERPL-MCNC: 0.6 MG/DL
BUN SERPL-MCNC: 8 MG/DL
CALCIUM SERPL-MCNC: 8 MG/DL
CHLORIDE SERPL-SCNC: 109 MMOL/L
CO2 SERPL-SCNC: 21 MMOL/L
CREAT SERPL-MCNC: 0.7 MG/DL
DELSYS: ABNORMAL
DIFFERENTIAL METHOD: ABNORMAL
EOSINOPHIL # BLD AUTO: 0.1 K/UL
EOSINOPHIL # BLD AUTO: 0.2 K/UL
EOSINOPHIL NFR BLD: 1 %
EOSINOPHIL NFR BLD: 1.1 %
EOSINOPHIL NFR BLD: 1.2 %
EOSINOPHIL NFR BLD: 1.3 %
EOSINOPHIL NFR BLD: 1.4 %
ERYTHROCYTE [DISTWIDTH] IN BLOOD BY AUTOMATED COUNT: 20 %
ERYTHROCYTE [DISTWIDTH] IN BLOOD BY AUTOMATED COUNT: 20.2 %
ERYTHROCYTE [DISTWIDTH] IN BLOOD BY AUTOMATED COUNT: 20.3 %
ERYTHROCYTE [DISTWIDTH] IN BLOOD BY AUTOMATED COUNT: 20.3 %
ERYTHROCYTE [DISTWIDTH] IN BLOOD BY AUTOMATED COUNT: 20.4 %
EST. GFR  (AFRICAN AMERICAN): >60 ML/MIN/1.73 M^2
EST. GFR  (NON AFRICAN AMERICAN): >60 ML/MIN/1.73 M^2
FIO2: 40
GLUCOSE SERPL-MCNC: 116 MG/DL
HCO3 UR-SCNC: 25.3 MMOL/L (ref 24–28)
HCT VFR BLD AUTO: 24.8 %
HCT VFR BLD AUTO: 25.4 %
HCT VFR BLD AUTO: 26.4 %
HCT VFR BLD AUTO: 26.4 %
HCT VFR BLD AUTO: 26.6 %
HGB BLD-MCNC: 7.1 G/DL
HGB BLD-MCNC: 7.3 G/DL
HGB BLD-MCNC: 7.4 G/DL
HGB BLD-MCNC: 7.5 G/DL
HGB BLD-MCNC: 7.5 G/DL
IMM GRANULOCYTES # BLD AUTO: 0.04 K/UL
IMM GRANULOCYTES # BLD AUTO: 0.05 K/UL
IMM GRANULOCYTES NFR BLD AUTO: 0.4 %
INR PPP: 1.1
INR PPP: 1.1
LEVETIRACETAM SERPL-MCNC: 47.9 UG/ML (ref 3–60)
LYMPHOCYTES # BLD AUTO: 1.7 K/UL
LYMPHOCYTES # BLD AUTO: 1.9 K/UL
LYMPHOCYTES # BLD AUTO: 2.3 K/UL
LYMPHOCYTES # BLD AUTO: 2.5 K/UL
LYMPHOCYTES # BLD AUTO: 2.5 K/UL
LYMPHOCYTES NFR BLD: 16.6 %
LYMPHOCYTES NFR BLD: 17.1 %
LYMPHOCYTES NFR BLD: 20.6 %
LYMPHOCYTES NFR BLD: 22.8 %
LYMPHOCYTES NFR BLD: 22.8 %
MAGNESIUM SERPL-MCNC: 2.3 MG/DL
MCH RBC QN AUTO: 22.6 PG
MCH RBC QN AUTO: 22.7 PG
MCH RBC QN AUTO: 22.7 PG
MCH RBC QN AUTO: 22.9 PG
MCH RBC QN AUTO: 23.5 PG
MCHC RBC AUTO-ENTMCNC: 28 G/DL
MCHC RBC AUTO-ENTMCNC: 28.2 G/DL
MCHC RBC AUTO-ENTMCNC: 28.4 G/DL
MCHC RBC AUTO-ENTMCNC: 28.6 G/DL
MCHC RBC AUTO-ENTMCNC: 28.7 G/DL
MCV RBC AUTO: 79 FL
MCV RBC AUTO: 81 FL
MCV RBC AUTO: 82 FL
MODE: ABNORMAL
MONOCYTES # BLD AUTO: 0.9 K/UL
MONOCYTES # BLD AUTO: 1 K/UL
MONOCYTES # BLD AUTO: 1.1 K/UL
MONOCYTES NFR BLD: 10 %
MONOCYTES NFR BLD: 8 %
MONOCYTES NFR BLD: 8.4 %
MONOCYTES NFR BLD: 9 %
MONOCYTES NFR BLD: 9.2 %
NEUTROPHILS # BLD AUTO: 7.1 K/UL
NEUTROPHILS # BLD AUTO: 7.3 K/UL
NEUTROPHILS # BLD AUTO: 7.3 K/UL
NEUTROPHILS # BLD AUTO: 7.6 K/UL
NEUTROPHILS # BLD AUTO: 8.1 K/UL
NEUTROPHILS NFR BLD: 64.8 %
NEUTROPHILS NFR BLD: 66.7 %
NEUTROPHILS NFR BLD: 69.3 %
NEUTROPHILS NFR BLD: 71.2 %
NEUTROPHILS NFR BLD: 72.2 %
NRBC BLD-RTO: 0 /100 WBC
OB PNL STL: POSITIVE
PCO2 BLDA: 37 MMHG (ref 35–45)
PH SMN: 7.44 [PH] (ref 7.35–7.45)
PHOSPHATE SERPL-MCNC: 2.2 MG/DL
PLATELET # BLD AUTO: 304 K/UL
PLATELET # BLD AUTO: 317 K/UL
PLATELET # BLD AUTO: 332 K/UL
PLATELET # BLD AUTO: 352 K/UL
PLATELET # BLD AUTO: 360 K/UL
PMV BLD AUTO: 10 FL
PMV BLD AUTO: 9.7 FL
PMV BLD AUTO: 9.8 FL
PO2 BLDA: 112 MMHG (ref 80–100)
POC BE: 1 MMOL/L
POC SATURATED O2: 99 % (ref 95–100)
POC TCO2: 26 MMOL/L (ref 23–27)
POTASSIUM SERPL-SCNC: 4 MMOL/L
PROT SERPL-MCNC: 7.6 G/DL
PROTHROMBIN TIME: 11.1 SEC
PROTHROMBIN TIME: 11.1 SEC
RBC # BLD AUTO: 3.1 M/UL
RBC # BLD AUTO: 3.13 M/UL
RBC # BLD AUTO: 3.28 M/UL
RBC # BLD AUTO: 3.28 M/UL
RBC # BLD AUTO: 3.3 M/UL
SAMPLE: ABNORMAL
SITE: ABNORMAL
SODIUM SERPL-SCNC: 138 MMOL/L
SP02: 100
TROPONIN I SERPL DL<=0.01 NG/ML-MCNC: 0.09 NG/ML
WBC # BLD AUTO: 10.2 K/UL
WBC # BLD AUTO: 10.88 K/UL
WBC # BLD AUTO: 10.94 K/UL
WBC # BLD AUTO: 10.97 K/UL
WBC # BLD AUTO: 11.17 K/UL

## 2018-11-24 PROCEDURE — 25000003 PHARM REV CODE 250: Performed by: NURSE PRACTITIONER

## 2018-11-24 PROCEDURE — 27000221 HC OXYGEN, UP TO 24 HOURS

## 2018-11-24 PROCEDURE — C9254 INJECTION, LACOSAMIDE: HCPCS | Performed by: PSYCHIATRY & NEUROLOGY

## 2018-11-24 PROCEDURE — 85610 PROTHROMBIN TIME: CPT

## 2018-11-24 PROCEDURE — 83735 ASSAY OF MAGNESIUM: CPT

## 2018-11-24 PROCEDURE — 25000003 PHARM REV CODE 250: Performed by: PSYCHIATRY & NEUROLOGY

## 2018-11-24 PROCEDURE — 94761 N-INVAS EAR/PLS OXIMETRY MLT: CPT

## 2018-11-24 PROCEDURE — 80053 COMPREHEN METABOLIC PANEL: CPT

## 2018-11-24 PROCEDURE — 94150 VITAL CAPACITY TEST: CPT

## 2018-11-24 PROCEDURE — 99900026 HC AIRWAY MAINTENANCE (STAT)

## 2018-11-24 PROCEDURE — 99233 SBSQ HOSP IP/OBS HIGH 50: CPT | Mod: GC,,, | Performed by: NEUROLOGICAL SURGERY

## 2018-11-24 PROCEDURE — A4216 STERILE WATER/SALINE, 10 ML: HCPCS | Performed by: NURSE PRACTITIONER

## 2018-11-24 PROCEDURE — 84484 ASSAY OF TROPONIN QUANT: CPT

## 2018-11-24 PROCEDURE — 63600175 PHARM REV CODE 636 W HCPCS: Performed by: PSYCHIATRY & NEUROLOGY

## 2018-11-24 PROCEDURE — 63600175 PHARM REV CODE 636 W HCPCS: Performed by: NURSE PRACTITIONER

## 2018-11-24 PROCEDURE — 20000000 HC ICU ROOM

## 2018-11-24 PROCEDURE — 37799 UNLISTED PX VASCULAR SURGERY: CPT

## 2018-11-24 PROCEDURE — 94010 BREATHING CAPACITY TEST: CPT

## 2018-11-24 PROCEDURE — 82803 BLOOD GASES ANY COMBINATION: CPT

## 2018-11-24 PROCEDURE — 85025 COMPLETE CBC W/AUTO DIFF WBC: CPT | Mod: 91

## 2018-11-24 PROCEDURE — C9113 INJ PANTOPRAZOLE SODIUM, VIA: HCPCS | Performed by: NURSE PRACTITIONER

## 2018-11-24 PROCEDURE — 63600175 PHARM REV CODE 636 W HCPCS: Performed by: STUDENT IN AN ORGANIZED HEALTH CARE EDUCATION/TRAINING PROGRAM

## 2018-11-24 PROCEDURE — 99233 SBSQ HOSP IP/OBS HIGH 50: CPT | Mod: GC,,, | Performed by: PSYCHIATRY & NEUROLOGY

## 2018-11-24 PROCEDURE — 94003 VENT MGMT INPAT SUBQ DAY: CPT

## 2018-11-24 PROCEDURE — 82272 OCCULT BLD FECES 1-3 TESTS: CPT

## 2018-11-24 PROCEDURE — 84100 ASSAY OF PHOSPHORUS: CPT

## 2018-11-24 PROCEDURE — 99900035 HC TECH TIME PER 15 MIN (STAT)

## 2018-11-24 RX ADMIN — POTASSIUM & SODIUM PHOSPHATES POWDER PACK 280-160-250 MG 2 PACKET: 280-160-250 PACK at 02:11

## 2018-11-24 RX ADMIN — PANTOPRAZOLE SODIUM 40 MG: 40 INJECTION, POWDER, FOR SOLUTION INTRAVENOUS at 08:11

## 2018-11-24 RX ADMIN — MIDAZOLAM HYDROCHLORIDE 2 MG: 1 INJECTION, SOLUTION INTRAMUSCULAR; INTRAVENOUS at 07:11

## 2018-11-24 RX ADMIN — SODIUM CHLORIDE 100 MG: 9 INJECTION, SOLUTION INTRAVENOUS at 08:11

## 2018-11-24 RX ADMIN — CHLORHEXIDINE GLUCONATE 0.12% ORAL RINSE 15 ML: 1.2 LIQUID ORAL at 09:11

## 2018-11-24 RX ADMIN — POLYETHYLENE GLYCOL 3350 17 G: 17 POWDER, FOR SOLUTION ORAL at 08:11

## 2018-11-24 RX ADMIN — LEVOTHYROXINE SODIUM 100 MCG: 100 TABLET ORAL at 05:11

## 2018-11-24 RX ADMIN — POTASSIUM & SODIUM PHOSPHATES POWDER PACK 280-160-250 MG 2 PACKET: 280-160-250 PACK at 08:11

## 2018-11-24 RX ADMIN — LEVETIRACETAM 1500 MG: 15 INJECTION INTRAVENOUS at 09:11

## 2018-11-24 RX ADMIN — Medication 3 ML: at 06:11

## 2018-11-24 RX ADMIN — HEPARIN SODIUM 5000 UNITS: 5000 INJECTION, SOLUTION INTRAVENOUS; SUBCUTANEOUS at 02:11

## 2018-11-24 RX ADMIN — HEPARIN SODIUM 5000 UNITS: 5000 INJECTION, SOLUTION INTRAVENOUS; SUBCUTANEOUS at 09:11

## 2018-11-24 RX ADMIN — SODIUM CHLORIDE 100 MG: 9 INJECTION, SOLUTION INTRAVENOUS at 09:11

## 2018-11-24 RX ADMIN — Medication 3 ML: at 02:11

## 2018-11-24 RX ADMIN — ASPIRIN 325 MG ORAL TABLET 325 MG: 325 PILL ORAL at 08:11

## 2018-11-24 RX ADMIN — Medication 3 ML: at 09:11

## 2018-11-24 RX ADMIN — CHLORHEXIDINE GLUCONATE 0.12% ORAL RINSE 15 ML: 1.2 LIQUID ORAL at 05:11

## 2018-11-24 RX ADMIN — METOPROLOL TARTRATE 25 MG: 25 TABLET ORAL at 09:11

## 2018-11-24 RX ADMIN — POTASSIUM & SODIUM PHOSPHATES POWDER PACK 280-160-250 MG 2 PACKET: 280-160-250 PACK at 05:11

## 2018-11-24 RX ADMIN — DEXMEDETOMIDINE HYDROCHLORIDE 0.8 MCG/KG/HR: 100 INJECTION, SOLUTION, CONCENTRATE INTRAVENOUS at 07:11

## 2018-11-24 RX ADMIN — CHLORHEXIDINE GLUCONATE 0.12% ORAL RINSE 15 ML: 1.2 LIQUID ORAL at 02:11

## 2018-11-24 RX ADMIN — HEPARIN SODIUM 5000 UNITS: 5000 INJECTION, SOLUTION INTRAVENOUS; SUBCUTANEOUS at 05:11

## 2018-11-24 RX ADMIN — SENNOSIDES AND DOCUSATE SODIUM 1 TABLET: 8.6; 5 TABLET ORAL at 08:11

## 2018-11-24 NOTE — SUBJECTIVE & OBJECTIVE
Interval History: 11/24: AFVSS, NAEON, exam improved, now sedated on precedex; EEG w/ R sided abundant epileptiform periodic discharges suggestive of possible ictal-inter ictal continuum and severe generalized slowing seen,suggestive of severe diffuse or multifocal cerebral dysfunction    Medications:  Continuous Infusions:   sodium chloride 0.9% 75 mL/hr at 11/23/18 2200    dexmedetomidine (PRECEDEX) infusion 0.2 mcg/kg/hr (11/23/18 2200)     Scheduled Meds:   aspirin  325 mg Per NG tube Daily    chlorhexidine  15 mL Mouth/Throat QID    DULoxetine  60 mg Per NG tube Daily    heparin (porcine)  5,000 Units Subcutaneous Q8H    lacosamide (VIMPAT) IVPB  100 mg Intravenous Q12H    levetiracetam IVPB  1,500 mg Intravenous Q12H    [START ON 11/24/2018] levothyroxine  100 mcg Per NG tube Before breakfast    metoprolol tartrate  25 mg Per NG tube BID    pantoprazole  40 mg Intravenous Daily    polyethylene glycol  17 g Per NG tube Daily    senna-docusate 8.6-50 mg  1 tablet Per NG tube Daily    sodium chloride 0.9%  3 mL Intravenous Q8H     PRN Meds:albuterol-ipratropium, magnesium oxide, magnesium oxide, midazolam, ondansetron, potassium chloride 10%, potassium chloride 10%, potassium chloride 10%, potassium, sodium phosphates, potassium, sodium phosphates, potassium, sodium phosphates, sodium chloride 0.9%     Review of Systems  Objective:     Weight: 84.4 kg (186 lb)  Body mass index is 32.95 kg/m².  Vital Signs (Most Recent):  Temp: 99.1 °F (37.3 °C) (11/23/18 1905)  Pulse: 71 (11/23/18 2205)  Resp: 13 (11/23/18 2205)  BP: (!) 121/56 (11/23/18 2205)  SpO2: 100 % (11/23/18 2205) Vital Signs (24h Range):  Temp:  [98.8 °F (37.1 °C)-99.2 °F (37.3 °C)] 99.1 °F (37.3 °C)  Pulse:  [] 71  Resp:  [13-31] 13  SpO2:  [95 %-100 %] 100 %  BP: (121-167)/(56-79) 121/56  Arterial Line BP: (129-180)/(48-74) 129/55     Date 11/23/18 0700 - 11/24/18 0659   Shift 3192-7510 9973-2539 1451-5541 24 Hour Total   INTAKE    I.V.(mL/kg) 1507.5(17.9) 686.3(8.1)  2193.8(26)   NG/GT 70   70   IV Piggyback  200  200   Shift Total(mL/kg) 1577.5(18.7) 886.3(10.5)  2463.8(29.2)   OUTPUT   Urine(mL/kg/hr) 200(0.3) 200  400   Shift Total(mL/kg) 200(2.4) 200(2.4)  400(4.7)   Weight (kg) 84.4 84.4 84.4 84.4              Vent Mode: Spont  Oxygen Concentration (%):  [] 41  Resp Rate Total:  [12 br/min-24 br/min] 15 br/min  Vt Set:  [500 mL] 500 mL  PEEP/CPAP:  [5 cmH20] 5 cmH20  Pressure Support:  [10 cmH20] 10 cmH20  Mean Airway Pressure:  [6.7 cmH20-10 cmH20] 7.9 cmH20         NG/OG Tube 11/22/18 Right mouth (Active)   Placement Check placement verified by x-ray 11/23/2018  7:05 PM   Distal Tube Length (cm) 55 11/23/2018  7:05 PM   Tolerance no signs/symptoms of discomfort 11/23/2018  7:05 PM   Securement taped to commercial device 11/23/2018  7:05 PM   Clamp Status/Tolerance clamped 11/23/2018  7:05 PM   Insertion Site Appearance no redness, warmth, tenderness, skin breakdown, drainage 11/23/2018  3:05 PM   Flush/Irrigation flushed w/;water;no resistance met 11/23/2018  7:05 PM   Intake (mL) - Formula Tube Feeding 70 11/23/2018 11:05 AM       Female External Urinary Catheter 11/22/18 1900 (Active)   Skin no redness;no breakdown 11/23/2018  7:05 PM   Tolerance no signs/symptoms of discomfort 11/23/2018  7:05 PM   Suction Continuous suction at 60 mmHg 11/23/2018  7:05 PM   Date of last wick change 11/23/18 11/23/2018  7:05 PM   Time of last wick change 1905 11/23/2018  7:05 PM   Output (mL) 100 mL 11/23/2018  8:00 PM       Neurosurgery Physical Exam  General: On precedex [id number]  CNII-XII: PERRL, EOMi, facial symmetry noted  Extremities: FCx4    Significant Labs:  Recent Labs   Lab 11/22/18  1339 11/23/18  0225   * 140*    135*   K 4.8 4.6    108   CO2 20* 19*   BUN 12 10   CREATININE 1.1 0.9   CALCIUM 8.6* 8.1*   MG 2.3 2.7*     Recent Labs   Lab 11/23/18  0600 11/23/18  1128 11/23/18  1757   WBC 14.07* 13.28* 13.26*    HGB 7.6* 7.7* 7.8*   HCT 26.4* 26.8* 27.3*   * 344 351*     Recent Labs   Lab 11/22/18  1339 11/23/18  0225   INR 1.1 1.1   APTT 25.6 25.6     Microbiology Results (last 7 days)     Procedure Component Value Units Date/Time    Culture, Respiratory with Gram Stain [[id number]] Collected:  11/23/18 1132    Order Status:  Completed Specimen:  Respiratory from Endotracheal Aspirate Updated:  11/23/18 1515     Gram Stain (Respiratory) Few WBC's     Gram Stain (Respiratory) No organisms seen    Narrative:       Mini-BAL.    Blood culture (site 1) [[id number]] Collected:  11/22/18 1712    Order Status:  Completed Specimen:  Blood from Peripheral, Antecubital, Left Updated:  11/23/18 0715     Blood Culture, Routine No Growth to date    Narrative:       Site # 1, aerobic and anaerobic    Blood culture (site 2) [[id number]] Collected:  11/22/18 1724    Order Status:  Completed Specimen:  Blood from Peripheral, Wrist, Right Updated:  11/23/18 0715     Blood Culture, Routine No Growth to date    Narrative:       Site # 2, aerobic only    Culture, Respiratory with Gram Stain [[id number]] Collected:  11/22/18 1520    Order Status:  Completed Specimen:  Respiratory from Sputum Updated:  11/23/18 0106     Gram Stain (Respiratory) <10 epithelial cells per low power field.     Gram Stain (Respiratory) Few WBC's     Gram Stain (Respiratory) No organisms seen    Blood culture [[id number]]     Order Status:  Canceled Specimen:  Blood     Blood culture [[id number]]     Order Status:  Canceled Specimen:  Blood         ABGs:   Recent Labs   Lab 11/23/18  0509 11/23/18  1124   PH 7.477* 7.423   PCO2 35.1 37.2   PO2 113* 130*   HCO3 26.0 24.3   POCSATURATED 99 99   BE 2 0     Cardiac markers:   Recent Labs   Lab 11/22/18  1723 11/23/18  1539   TROPONINI 0.138* 0.089*     CMP:   Recent Labs   Lab 11/22/18  1339 11/23/18  0225   * 140*   CALCIUM 8.6* 8.1*   ALBUMIN 2.7* 2.5*   PROT 8.6* 8.1    135*   K 4.8 4.6   CO2 20* 19*     108   BUN 12 10   CREATININE 1.1 0.9   ALKPHOS 71 69   ALT 26 24   AST 60* 84*   BILITOT 0.7 0.5     CRP: No results for input(s): CRP in the last 48 hours.  ESR: No results for input(s): POCESR, ERYTHROCYTES in the last 48 hours.  LFTs:   Recent Labs   Lab 11/22/18  1339 11/23/18  0225   ALT 26 24   AST 60* 84*   ALKPHOS 71 69   BILITOT 0.7 0.5   PROT 8.6* 8.1   ALBUMIN 2.7* 2.5*     Procalcitonin:   Recent Labs   Lab 11/23/18  0948   PROCAL 0.16       Significant Diagnostics:  I have reviewed all pertinent imaging results/findings within the past 24 hours.

## 2018-11-24 NOTE — SUBJECTIVE & OBJECTIVE
Interval History:  No acute adverse events overnight    Review of Systems    Unable to obtain a complete ROS due to level of consciousness.  Objective:     Vitals:  Temp: 98.6 °F (37 °C)  Pulse: 70  Rhythm: normal sinus rhythm  BP: (!) 149/64  MAP (mmHg): 92  Resp: 14  SpO2: 100 %  Oxygen Concentration (%): 41  O2 Device (Oxygen Therapy): ventilator  Vent Mode: Spont  Pressure Support: 10 cmH20  PEEP/CPAP: 5 cmH20  Peak Airway Pressure: 16 cmH2O  Mean Airway Pressure: 8.2 cmH20  Plateau Pressure: 0 cmH20    Temp  Min: 97.8 °F (36.6 °C)  Max: 99.1 °F (37.3 °C)  Pulse  Min: 53  Max: 97  BP  Min: 118/58  Max: 167/71  MAP (mmHg)  Min: 81  Max: 113  Resp  Min: 11  Max: 25  SpO2  Min: 99 %  Max: 100 %  Oxygen Concentration (%)  Min: 40  Max: 41    11/23 0701 - 11/24 0700  In: 3095.3 [I.V.:2825.3]  Out: 600 [Urine:600]   Unmeasured Output  Urine Occurrence: 1  Stool Occurrence: 1  Pad Count: 1       Physical Exam   Constitutional: She appears well-developed and well-nourished. She appears lethargic.   HENT:   Kayy-orbital trauma   Eyes: EOM are normal. Pupils are equal, round, and reactive to light.   Neck:   C-collar in place   Cardiovascular: Normal rate.   Pulmonary/Chest:   Mechanical BS throughout   Abdominal: Soft. She exhibits no distension.   Neurological: She appears lethargic. No cranial nerve deficit or sensory deficit. GCS eye subscore is 3. GCS verbal subscore is 1. GCS motor subscore is 6.   BL LE weakness  Following commands throughout  C-collar in place         Medications:  Continuous    sodium chloride 0.9% Last Rate: 75 mL/hr at 11/24/18 1505   dexmedetomidine (PRECEDEX) infusion Last Rate: Stopped (11/24/18 0705)   Scheduled    aspirin 325 mg Daily   chlorhexidine 15 mL QID   DULoxetine 60 mg Daily   heparin (porcine) 5,000 Units Q8H   lacosamide (VIMPAT) IVPB 100 mg Q12H   levetiracetam IVPB 1,500 mg Q12H   levothyroxine 100 mcg Before breakfast   metoprolol tartrate 25 mg BID   pantoprazole 40 mg  Daily   polyethylene glycol 17 g Daily   senna-docusate 8.6-50 mg 1 tablet Daily   sodium chloride 0.9% 3 mL Q8H   PRN    albuterol-ipratropium 3 mL Q6H PRN   magnesium oxide 800 mg PRN   magnesium oxide 800 mg PRN   midazolam 2 mg Q5 Min PRN   ondansetron 4 mg Q8H PRN   potassium chloride 10% 40 mEq PRN   potassium chloride 10% 40 mEq PRN   potassium chloride 10% 60 mEq PRN   potassium, sodium phosphates 2 packet PRN   potassium, sodium phosphates 2 packet PRN   potassium, sodium phosphates 2 packet PRN   sodium chloride 0.9% 3 mL PRN         Diet  Diet NPO  Diet NPO

## 2018-11-24 NOTE — PROCEDURES
ICU EEG/VIDEO MONITORING REPORT    Melania Malagon  [id number]  1945    DATE OF SERVICE: 11/23-24/18    DATE OF ADMISSION: 11/22/2018  1:27 PM    ADMITTING PROVIDER: Ernesto Albarran MD    REASON FOR CONSULT: 72yo F admitted with seizures and AMS    METHODOLOGY   Electroencephalographic (EEG) recording is with electrodes placed according to the International 10-20 placement system.  Thirty two (32) channels of digital signal are simultaneously recorded from the scalp and may include EKG, EMG, and/or eye monitors.   Recording band pass was 0.1 to 512 hz.  Digital video recording of the patient is simultaneously recorded with the EEG.  The nursing staff report clinical symptoms and may press an event button when the patient has symptoms of clinical interest to the treating physicians.  EEG and video recording is stored and archived in digital format.  The entire recording is visually reviewed and the times identified by computer analysis as being spikes or seizures are reviewed again.  Activation procedures which include photic stimulation, hyperventilation and instructing patients to perform simple task are done in selected patients.   Compresses spectral analysis (CSA) is also performed on the activity recorded from each individual channel.  This is displayed as a power display of frequencies from 0 to 30 Hz over time.   The CSA analysis is done and displayed continuously.  This is reviewed for asymmetries in power between homologous areas of the scalp and for presence of changes in power which canbe seen when seizures occur.  Sections of suspected abnormalities on the CSA is then compared with the original EEG recording.     [employer] software was also utilized in the review of this study.  This software suite analyzes the EEG recording in multiple domains.  Coherence and rhythmicity is computed to identify EEG sections which may contain organized seizures.  Each channel undergoes analysis to detect presence of  spike and sharp waves which have special and morphological characteristic of epileptic activity.  The routine EEG recording is converted from spacial into frequency domain.  This is then displayed comparing homologous areas to identify areas of significant asymmetry.  Algorithm to identify non-cortically generated artifact is used to separate eye movement, EMG and other artifact from the EEG.      Recording Times  Start on 11/23/18, 13:29  Stop on 11/24/18, 13:15    A total of 23 hours and 46 minutes of EEG was recorded.    EEG FINDINGS   Background activity:   The background rhythm was characterized by sharply contoured delta-theta (2-5 Hz) activity  No posterior dominant rhythm seen.   Symmetry and continuity: the background appeared continuous and asymmetric    Sleep:   Normal sleep transients seen    Activation procedures:   Not done    Abnormal activity:   Frequent to abundant generalized 1-2 Hz spike-polyspike and wave discharges are seen throughout the period of review without any evolution         IMPRESSION:   This is an abnormal C-EEG, due to:  1) frequent to abundant generalized epileptiform discharges seen, suggestive of primary generalized epilepsy  2) severe generalized slowing seen,suggestive of severe diffuse or multifocal cerebral dysfunction    CLINICAL CORRELATION IS RECOMMENDED.    Marti Tsai MD, VALORIE(), FACNS, TRUPTI.  Neurology-Epilepsy.  [facility].

## 2018-11-24 NOTE — PROGRESS NOTES
[facility]  Neurosurgery  Progress Note    Subjective:     History of Present Illness: Melania Malagon is a 73 y.o. year old female with PMHx of HTN, DM, seizure disorder who lives in NH and transferred to [facility] with SE and cervical frx for care escalation. Unable to obtain hx due to AMS. Per charting, she had seizure at NH followed by mechanical fall. At OSH, she was intubated for airway protection and loaded with AED. Lab was significant for leucocytosis, lactic acidosis , and elevated ammonia. CT head was negative for acute process. C spine CT shows minimally displaced type 1 samuel frx and type 2 odontoid frx without retropulsion or canal compromise. She is on ASA per charting. NSGY consulted for further evaluation and possible intervention.    Post-Op Info:  * No surgery found *         Interval History: 11/24: AFVSS, NAEON, exam improved, now sedated on precedex; EEG w/ R sided abundant epileptiform periodic discharges suggestive of possible ictal-inter ictal continuum and severe generalized slowing seen,suggestive of severe diffuse or multifocal cerebral dysfunction    Medications:  Continuous Infusions:   sodium chloride 0.9% 75 mL/hr at 11/23/18 2200    dexmedetomidine (PRECEDEX) infusion 0.2 mcg/kg/hr (11/23/18 2200)     Scheduled Meds:   aspirin  325 mg Per NG tube Daily    chlorhexidine  15 mL Mouth/Throat QID    DULoxetine  60 mg Per NG tube Daily    heparin (porcine)  5,000 Units Subcutaneous Q8H    lacosamide (VIMPAT) IVPB  100 mg Intravenous Q12H    levetiracetam IVPB  1,500 mg Intravenous Q12H    [START ON 11/24/2018] levothyroxine  100 mcg Per NG tube Before breakfast    metoprolol tartrate  25 mg Per NG tube BID    pantoprazole  40 mg Intravenous Daily    polyethylene glycol  17 g Per NG tube Daily    senna-docusate 8.6-50 mg  1 tablet Per NG tube Daily    sodium chloride 0.9%  3 mL Intravenous Q8H     PRN Meds:albuterol-ipratropium, magnesium oxide, magnesium  oxide, midazolam, ondansetron, potassium chloride 10%, potassium chloride 10%, potassium chloride 10%, potassium, sodium phosphates, potassium, sodium phosphates, potassium, sodium phosphates, sodium chloride 0.9%     Review of Systems  Objective:     Weight: 84.4 kg (186 lb)  Body mass index is 32.95 kg/m².  Vital Signs (Most Recent):  Temp: 99.1 °F (37.3 °C) (11/23/18 1905)  Pulse: 71 (11/23/18 2205)  Resp: 13 (11/23/18 2205)  BP: (!) 121/56 (11/23/18 2205)  SpO2: 100 % (11/23/18 2205) Vital Signs (24h Range):  Temp:  [98.8 °F (37.1 °C)-99.2 °F (37.3 °C)] 99.1 °F (37.3 °C)  Pulse:  [] 71  Resp:  [13-31] 13  SpO2:  [95 %-100 %] 100 %  BP: (121-167)/(56-79) 121/56  Arterial Line BP: (129-180)/(48-74) 129/55     Date 11/23/18 0700 - 11/24/18 0659   Shift 4574-1885 2007-2859 3905-4042 24 Hour Total   INTAKE   I.V.(mL/kg) 1507.5(17.9) 686.3(8.1)  2193.8(26)   NG/GT 70   70   IV Piggyback  200  200   Shift Total(mL/kg) 1577.5(18.7) 886.3(10.5)  2463.8(29.2)   OUTPUT   Urine(mL/kg/hr) 200(0.3) 200  400   Shift Total(mL/kg) 200(2.4) 200(2.4)  400(4.7)   Weight (kg) 84.4 84.4 84.4 84.4              Vent Mode: Spont  Oxygen Concentration (%):  [] 41  Resp Rate Total:  [12 br/min-24 br/min] 15 br/min  Vt Set:  [500 mL] 500 mL  PEEP/CPAP:  [5 cmH20] 5 cmH20  Pressure Support:  [10 cmH20] 10 cmH20  Mean Airway Pressure:  [6.7 cmH20-10 cmH20] 7.9 cmH20         NG/OG Tube 11/22/18 Right mouth (Active)   Placement Check placement verified by x-ray 11/23/2018  7:05 PM   Distal Tube Length (cm) 55 11/23/2018  7:05 PM   Tolerance no signs/symptoms of discomfort 11/23/2018  7:05 PM   Securement taped to commercial device 11/23/2018  7:05 PM   Clamp Status/Tolerance clamped 11/23/2018  7:05 PM   Insertion Site Appearance no redness, warmth, tenderness, skin breakdown, drainage 11/23/2018  3:05 PM   Flush/Irrigation flushed w/;water;no resistance met 11/23/2018  7:05 PM   Intake (mL) - Formula Tube Feeding 70 11/23/2018  11:05 AM       Female External Urinary Catheter 11/22/18 1900 (Active)   Skin no redness;no breakdown 11/23/2018  7:05 PM   Tolerance no signs/symptoms of discomfort 11/23/2018  7:05 PM   Suction Continuous suction at 60 mmHg 11/23/2018  7:05 PM   Date of last wick change 11/23/18 11/23/2018  7:05 PM   Time of last wick change 1905 11/23/2018  7:05 PM   Output (mL) 100 mL 11/23/2018  8:00 PM       Neurosurgery Physical Exam  General: On precedex [id number]  CNII-XII: PERRL, EOMi, facial symmetry noted  Extremities: FCx4    Significant Labs:  Recent Labs   Lab 11/22/18  1339 11/23/18  0225   * 140*    135*   K 4.8 4.6    108   CO2 20* 19*   BUN 12 10   CREATININE 1.1 0.9   CALCIUM 8.6* 8.1*   MG 2.3 2.7*     Recent Labs   Lab 11/23/18  0600 11/23/18  1128 11/23/18  1757   WBC 14.07* 13.28* 13.26*   HGB 7.6* 7.7* 7.8*   HCT 26.4* 26.8* 27.3*   * 344 351*     Recent Labs   Lab 11/22/18  1339 11/23/18  0225   INR 1.1 1.1   APTT 25.6 25.6     Microbiology Results (last 7 days)     Procedure Component Value Units Date/Time    Culture, Respiratory with Gram Stain [[id number]] Collected:  11/23/18 1132    Order Status:  Completed Specimen:  Respiratory from Endotracheal Aspirate Updated:  11/23/18 1515     Gram Stain (Respiratory) Few WBC's     Gram Stain (Respiratory) No organisms seen    Narrative:       Mini-BAL.    Blood culture (site 1) [[id number]] Collected:  11/22/18 1712    Order Status:  Completed Specimen:  Blood from Peripheral, Antecubital, Left Updated:  11/23/18 0715     Blood Culture, Routine No Growth to date    Narrative:       Site # 1, aerobic and anaerobic    Blood culture (site 2) [[id number]] Collected:  11/22/18 1724    Order Status:  Completed Specimen:  Blood from Peripheral, Wrist, Right Updated:  11/23/18 0715     Blood Culture, Routine No Growth to date    Narrative:       Site # 2, aerobic only    Culture, Respiratory with Gram Stain [[id number]] Collected:  11/22/18 0390     Order Status:  Completed Specimen:  Respiratory from Sputum Updated:  11/23/18 0106     Gram Stain (Respiratory) <10 epithelial cells per low power field.     Gram Stain (Respiratory) Few WBC's     Gram Stain (Respiratory) No organisms seen    Blood culture [[id number]]     Order Status:  Canceled Specimen:  Blood     Blood culture [[id number]]     Order Status:  Canceled Specimen:  Blood         ABGs:   Recent Labs   Lab 11/23/18  0509 11/23/18  1124   PH 7.477* 7.423   PCO2 35.1 37.2   PO2 113* 130*   HCO3 26.0 24.3   POCSATURATED 99 99   BE 2 0     Cardiac markers:   Recent Labs   Lab 11/22/18  1723 11/23/18  1539   TROPONINI 0.138* 0.089*     CMP:   Recent Labs   Lab 11/22/18  1339 11/23/18  0225   * 140*   CALCIUM 8.6* 8.1*   ALBUMIN 2.7* 2.5*   PROT 8.6* 8.1    135*   K 4.8 4.6   CO2 20* 19*    108   BUN 12 10   CREATININE 1.1 0.9   ALKPHOS 71 69   ALT 26 24   AST 60* 84*   BILITOT 0.7 0.5     CRP: No results for input(s): CRP in the last 48 hours.  ESR: No results for input(s): POCESR, ERYTHROCYTES in the last 48 hours.  LFTs:   Recent Labs   Lab 11/22/18  1339 11/23/18  0225   ALT 26 24   AST 60* 84*   ALKPHOS 71 69   BILITOT 0.7 0.5   PROT 8.6* 8.1   ALBUMIN 2.7* 2.5*     Procalcitonin:   Recent Labs   Lab 11/23/18  0948   PROCAL 0.16       Significant Diagnostics:  I have reviewed all pertinent imaging results/findings within the past 24 hours.    Assessment/Plan:     Cervical spine fracture    72 yo female admitted to [facility] with NCSE also found to have type I atlas and type II odontoid fractures.    No acute events overnight. Pt is on cEEG for seizure identification and control per primary team. MRI confirms acute atlantoaxial trauma. No central canal compromise. No traumatic disk extrusion or herniation. No extraaxial hematoma.    --Continue care per primary team.  --Continue AED regimen per epilepsy and NCC      -Epilepsy note suggests continued generalized epilepsy per EEG  --WTE when  appropriate per NCC  --Continue cervical orthosis in rigid collar at all times.  --[place] fracture is stable, odontoid fracture is unstable may be amenable to surgical intervention once pt as been medically optimized and has adequate seizure control.  --We will continue to monitor closely, please contact us with any questions or concerns.           Orlando Duran MD  Neurosurgery  [facility]

## 2018-11-24 NOTE — PLAN OF CARE
Problem: Patient Care Overview  Goal: Plan of Care Review  Outcome: Ongoing (interventions implemented as appropriate)  POC reviewed with pt and daughter at 1400. Pt's daughter verbalized understanding. Questions and concerns addressed. No acute events today. Pt was taken off of EEG. Patient has had multiple BM's throughout the day. Advancing TF's and going to turn them off closer to potential extubation. Pt progressing toward goals. Will continue to monitor. See flowsheets for full assessment and VS info.

## 2018-11-24 NOTE — PROCEDURES
[facility]     PRELIMINARY C-EEG REPORT:  Review: 11/23/18, 13:29 (start) - 18:12      Moderate to severe generalized slowing seen  Frequent to abundant generalized 1-2 Hz spike-polyspike and wave discharges are seen throughout the period of review without any evolution - suggestive of underlying primary generalized epilepsy         Full report to follow.  Will continue to monitor.     Thank you for involving us in the care of this patient.    Marti Tsai MD, VALORIE(), FACNS, TRUPTI.  Neurology-Epilepsy.  [facility].

## 2018-11-24 NOTE — ASSESSMENT & PLAN NOTE
Vent Mode: Spont  Oxygen Concentration (%):  [40-41] 41  Resp Rate Total:  [10 br/min-20 br/min] 13 br/min  PEEP/CPAP:  [5 cmH20] 5 cmH20  Pressure Support:  [10 cmH20] 10 cmH20  Mean Airway Pressure:  [7.2 cmH20-8.3 cmH20] 8.2 cmH20  Abg, cxr  Continue vent weaning

## 2018-11-24 NOTE — ASSESSMENT & PLAN NOTE
Seizures at OSH  -received versed and IV fosphenytoin at OSH. Started on ketamine gtt 0.5 mcg/kg/min en route in helicopter  -On arrival to [facility], loaded with keppra 2 g IV and maintenance keppra 1 g IV BID  -continous EEG monitoring without epileptiform activity thus far  -Epilepsy consulted - added vimpat EEG much improved  -continue current regimen

## 2018-11-24 NOTE — PROGRESS NOTES
[facility]  Neurocritical Care  Progress Note    Admit Date: 11/22/2018  Service Date: 11/24/2018  Length of Stay: 2    Subjective:     Chief Complaint: <principal problem not specified>    History of Present Illness: 73 y F transferred from [place] for cervical spine injury and seizure activity. She has pmh of HTN, DM, seizure disorder and had an unwitnessed seizure (presumed) at nursing home today followed by fall. On arrival by EMS she was responding minimally. Did not require CPR. She had one seizure en route to hospital at [place] and received versed. At ER at OSH, patient had another witnessed seizure with tonic clonic activity in extremities and became unresponsive. She was intubated, and given iv fosphenytoin.  CTH was negative. CT c-spine showed odontoid fracture and C1 fracture. She received versed and fosphenytoin and OSH, when she was transferred by helicopter to [facility], en route she was started on ketamine gtt at 0.5 mcg/kg/min. She was transferred to [facility] for cervical fracture requiring neurosurg intervention and also seizure monitoring and control.      TO note  Patient had recent pneumonia and GI bleed earlier this month and was treated at OSH. She was discharged on 11/20 with levaquin for pneumonia and returned to NH     Hospital Course: 11/22: admit to Neuro ICU for higher level of care. Neurosurgery consulted for C-spine fracture. MRI c-spine ordered. Continous EEG monitoring. Epilepsy consulted. Loaded keppra and maintenance keppra    Interval History:  No acute adverse events overnight    Review of Systems    Unable to obtain a complete ROS due to level of consciousness.  Objective:     Vitals:  Temp: 98.6 °F (37 °C)  Pulse: 70  Rhythm: normal sinus rhythm  BP: (!) 149/64  MAP (mmHg): 92  Resp: 14  SpO2: 100 %  Oxygen Concentration (%): 41  O2 Device (Oxygen Therapy): ventilator  Vent Mode: Spont  Pressure Support: 10 cmH20  PEEP/CPAP: 5 cmH20  Peak Airway Pressure: 16  cmH2O  Mean Airway Pressure: 8.2 cmH20  Plateau Pressure: 0 cmH20    Temp  Min: 97.8 °F (36.6 °C)  Max: 99.1 °F (37.3 °C)  Pulse  Min: 53  Max: 97  BP  Min: 118/58  Max: 167/71  MAP (mmHg)  Min: 81  Max: 113  Resp  Min: 11  Max: 25  SpO2  Min: 99 %  Max: 100 %  Oxygen Concentration (%)  Min: 40  Max: 41    11/23 0701 - 11/24 0700  In: 3095.3 [I.V.:2825.3]  Out: 600 [Urine:600]   Unmeasured Output  Urine Occurrence: 1  Stool Occurrence: 1  Pad Count: 1       Physical Exam   Constitutional: She appears well-developed and well-nourished. She appears lethargic.   HENT:   Kayy-orbital trauma   Eyes: EOM are normal. Pupils are equal, round, and reactive to light.   Neck:   C-collar in place   Cardiovascular: Normal rate.   Pulmonary/Chest:   Mechanical BS throughout   Abdominal: Soft. She exhibits no distension.   Neurological: She appears lethargic. No cranial nerve deficit or sensory deficit. GCS eye subscore is 3. GCS verbal subscore is 1. GCS motor subscore is 6.   BL LE weakness  Following commands throughout  C-collar in place         Medications:  Continuous    sodium chloride 0.9% Last Rate: 75 mL/hr at 11/24/18 1505   dexmedetomidine (PRECEDEX) infusion Last Rate: Stopped (11/24/18 0705)   Scheduled    aspirin 325 mg Daily   chlorhexidine 15 mL QID   DULoxetine 60 mg Daily   heparin (porcine) 5,000 Units Q8H   lacosamide (VIMPAT) IVPB 100 mg Q12H   levetiracetam IVPB 1,500 mg Q12H   levothyroxine 100 mcg Before breakfast   metoprolol tartrate 25 mg BID   pantoprazole 40 mg Daily   polyethylene glycol 17 g Daily   senna-docusate 8.6-50 mg 1 tablet Daily   sodium chloride 0.9% 3 mL Q8H   PRN    albuterol-ipratropium 3 mL Q6H PRN   magnesium oxide 800 mg PRN   magnesium oxide 800 mg PRN   midazolam 2 mg Q5 Min PRN   ondansetron 4 mg Q8H PRN   potassium chloride 10% 40 mEq PRN   potassium chloride 10% 40 mEq PRN   potassium chloride 10% 60 mEq PRN   potassium, sodium phosphates 2 packet PRN   potassium, sodium  phosphates 2 packet PRN   potassium, sodium phosphates 2 packet PRN   sodium chloride 0.9% 3 mL PRN         Diet  Diet NPO  Diet NPO        Assessment/Plan:     Neuro   Hysham coma scale total score 9-12    multifactorial     Seizures    Seizures at OSH  -received versed and IV fosphenytoin at OSH. Started on ketamine gtt 0.5 mcg/kg/min en route in helicopter  -On arrival to [facility], loaded with keppra 2 g IV and maintenance keppra 1 g IV BID  -continous EEG monitoring without epileptiform activity thus far  -Epilepsy consulted - added vimpat EEG much improved  -continue current regimen     Cervical spine fracture    MRI c-spine without cord compression, contusion or hematoma  CT c-spine with minimally displaced fractures of the posterior arch of the C1 vertebral body in keeping with type 1 [place] fracture and Type 2 odontoid fracture  C-collar in place  NSGY consulted         Oncology   Leukocytosis    Pan cx NGTD  resolved     Other   Endotracheally intubated    Vent Mode: Spont  Oxygen Concentration (%):  [40-41] 41  Resp Rate Total:  [10 br/min-20 br/min] 13 br/min  PEEP/CPAP:  [5 cmH20] 5 cmH20  Pressure Support:  [10 cmH20] 10 cmH20  Mean Airway Pressure:  [7.2 cmH20-8.3 cmH20] 8.2 cmH20  Abg, cxr  Continue vent weaning           The patient is being Prophylaxed for:  Venous Thromboembolism with: Mechanical or Chemical  Stress Ulcer with: H2B  Ventilator Pneumonia with: chlorhexidine oral care    Activity Orders          None        Full Code    Bakari Badillo MD  Neurocritical Care  [facility]

## 2018-11-24 NOTE — ASSESSMENT & PLAN NOTE
72 yo female admitted to [facility] with NCSE also found to have type I atlas and type II odontoid fractures.    No acute events overnight. Pt is on cEEG for seizure identification and control per primary team. MRI confirms acute atlantoaxial trauma. No central canal compromise. No traumatic disk extrusion or herniation. No extraaxial hematoma.    --Continue care per primary team.  --Continue AED regimen per epilepsy and NCC      -Epilepsy note suggests continued generalized epilepsy per EEG  --WTE when appropriate per NCC  --Continue cervical orthosis in rigid collar at all times.  --[place] fracture is stable, odontoid fracture is unstable may be amenable to surgical intervention once pt as been medically optimized and has adequate seizure control.  --We will continue to monitor closely, please contact us with any questions or concerns.

## 2018-11-24 NOTE — HOSPITAL COURSE
11/24: AFVSS, NAEON, exam poor but stable, now sedated on precedex; EEG w/ R sided abundant epileptiform periodic discharges suggestive of possible ictal-inter ictal continuum and severe generalized slowing seen,suggestive of severe diffuse or multifocal cerebral dysfunction  11/25: AFVSS, started fighting vent yesterday and required more precedex for sedation, counter-intuitively patient is more brisk on exam today, continued Sz mngt per epilepsy/NCC  11/29: PAULO AGARWALE today    12/1: NSGY called approximately 2245 PM s/p VFib arrest. Patient suddenly became unresponsive, underwent 2 rounds of chest compressions. Post ROSC awake and following commands but low O2 sat, intubated by anesthesia. C collar remained in place throughout code/intubation.

## 2018-11-24 NOTE — PLAN OF CARE
Problem: Patient Care Overview  Goal: Plan of Care Review  Outcome: Ongoing (interventions implemented as appropriate)   11/24/18 0408   Coping/Psychosocial   Plan Of Care Reviewed With patient;family     POC reviewed with pt at 0400. Pt verbalized understanding. Questions and concerns addressed. No acute events overnight. Pt progressing toward goals. Will continue to monitor. See flowsheets for full assessment and VS info. First dose of phos replaced. Troponin and CBC trended q 6

## 2018-11-25 LAB
ALBUMIN SERPL BCP-MCNC: 2.2 G/DL
ALLENS TEST: ABNORMAL
ALP SERPL-CCNC: 65 U/L
ALT SERPL W/O P-5'-P-CCNC: 19 U/L
ANION GAP SERPL CALC-SCNC: 8 MMOL/L
AST SERPL-CCNC: 39 U/L
BASOPHILS # BLD AUTO: 0.06 K/UL
BASOPHILS # BLD AUTO: 0.07 K/UL
BASOPHILS # BLD AUTO: 0.07 K/UL
BASOPHILS # BLD AUTO: 0.16 K/UL
BASOPHILS NFR BLD: 0.7 %
BASOPHILS NFR BLD: 0.7 %
BASOPHILS NFR BLD: 0.9 %
BASOPHILS NFR BLD: 0.9 %
BILIRUB SERPL-MCNC: 0.5 MG/DL
BUN SERPL-MCNC: 10 MG/DL
CALCIUM SERPL-MCNC: 7.7 MG/DL
CHLORIDE SERPL-SCNC: 112 MMOL/L
CO2 SERPL-SCNC: 20 MMOL/L
CREAT SERPL-MCNC: 0.7 MG/DL
DELSYS: ABNORMAL
DIFFERENTIAL METHOD: ABNORMAL
EOSINOPHIL # BLD AUTO: 0 K/UL
EOSINOPHIL # BLD AUTO: 0.1 K/UL
EOSINOPHIL # BLD AUTO: 0.1 K/UL
EOSINOPHIL # BLD AUTO: 0.2 K/UL
EOSINOPHIL NFR BLD: 0.4 %
EOSINOPHIL NFR BLD: 0.5 %
EOSINOPHIL NFR BLD: 0.9 %
EOSINOPHIL NFR BLD: 1 %
ERYTHROCYTE [DISTWIDTH] IN BLOOD BY AUTOMATED COUNT: 20.1 %
ERYTHROCYTE [DISTWIDTH] IN BLOOD BY AUTOMATED COUNT: 20.2 %
ERYTHROCYTE [DISTWIDTH] IN BLOOD BY AUTOMATED COUNT: 20.2 %
ERYTHROCYTE [DISTWIDTH] IN BLOOD BY AUTOMATED COUNT: 20.3 %
ERYTHROCYTE [SEDIMENTATION RATE] IN BLOOD BY WESTERGREN METHOD: 15 MM/H
EST. GFR  (AFRICAN AMERICAN): >60 ML/MIN/1.73 M^2
EST. GFR  (NON AFRICAN AMERICAN): >60 ML/MIN/1.73 M^2
FIO2: 40
GLUCOSE SERPL-MCNC: 111 MG/DL
HCO3 UR-SCNC: 24.2 MMOL/L (ref 24–28)
HCT VFR BLD AUTO: 25.3 %
HCT VFR BLD AUTO: 25.6 %
HCT VFR BLD AUTO: 26.3 %
HCT VFR BLD AUTO: 31.3 %
HGB BLD-MCNC: 7.1 G/DL
HGB BLD-MCNC: 7.1 G/DL
HGB BLD-MCNC: 7.7 G/DL
HGB BLD-MCNC: 8.4 G/DL
IMM GRANULOCYTES # BLD AUTO: 0.03 K/UL
IMM GRANULOCYTES # BLD AUTO: 0.03 K/UL
IMM GRANULOCYTES # BLD AUTO: 0.04 K/UL
IMM GRANULOCYTES # BLD AUTO: 0.09 K/UL
IMM GRANULOCYTES NFR BLD AUTO: 0.3 %
IMM GRANULOCYTES NFR BLD AUTO: 0.4 %
IMM GRANULOCYTES NFR BLD AUTO: 0.4 %
IMM GRANULOCYTES NFR BLD AUTO: 0.5 %
INR PPP: 1.1
LACTATE SERPL-SCNC: 1 MMOL/L
LYMPHOCYTES # BLD AUTO: 2 K/UL
LYMPHOCYTES # BLD AUTO: 2.1 K/UL
LYMPHOCYTES # BLD AUTO: 2.1 K/UL
LYMPHOCYTES # BLD AUTO: 7.7 K/UL
LYMPHOCYTES NFR BLD: 19.7 %
LYMPHOCYTES NFR BLD: 22.3 %
LYMPHOCYTES NFR BLD: 25 %
LYMPHOCYTES NFR BLD: 42.8 %
MAGNESIUM SERPL-MCNC: 2.1 MG/DL
MCH RBC QN AUTO: 22.3 PG
MCH RBC QN AUTO: 22.8 PG
MCH RBC QN AUTO: 23.1 PG
MCH RBC QN AUTO: 23.6 PG
MCHC RBC AUTO-ENTMCNC: 26.8 G/DL
MCHC RBC AUTO-ENTMCNC: 27.7 G/DL
MCHC RBC AUTO-ENTMCNC: 28.1 G/DL
MCHC RBC AUTO-ENTMCNC: 29.3 G/DL
MCV RBC AUTO: 81 FL
MCV RBC AUTO: 81 FL
MCV RBC AUTO: 83 FL
MCV RBC AUTO: 83 FL
MODE: ABNORMAL
MONOCYTES # BLD AUTO: 0.7 K/UL
MONOCYTES # BLD AUTO: 0.8 K/UL
MONOCYTES # BLD AUTO: 0.9 K/UL
MONOCYTES # BLD AUTO: 1.9 K/UL
MONOCYTES NFR BLD: 10.3 %
MONOCYTES NFR BLD: 8.8 %
MONOCYTES NFR BLD: 9 %
MONOCYTES NFR BLD: 9.1 %
NEUTROPHILS # BLD AUTO: 5.2 K/UL
NEUTROPHILS # BLD AUTO: 6.1 K/UL
NEUTROPHILS # BLD AUTO: 7.4 K/UL
NEUTROPHILS # BLD AUTO: 8 K/UL
NEUTROPHILS NFR BLD: 44.5 %
NEUTROPHILS NFR BLD: 63.8 %
NEUTROPHILS NFR BLD: 67.2 %
NEUTROPHILS NFR BLD: 69.9 %
NRBC BLD-RTO: 0 /100 WBC
PCO2 BLDA: 34.6 MMHG (ref 35–45)
PEEP: 5
PH SMN: 7.45 [PH] (ref 7.35–7.45)
PHOSPHATE SERPL-MCNC: 1.9 MG/DL
PHOSPHATE SERPL-MCNC: 2.2 MG/DL
PLATELET # BLD AUTO: 321 K/UL
PLATELET # BLD AUTO: 328 K/UL
PLATELET # BLD AUTO: 338 K/UL
PLATELET # BLD AUTO: 531 K/UL
PMV BLD AUTO: 9.3 FL
PMV BLD AUTO: 9.5 FL
PMV BLD AUTO: 9.7 FL
PMV BLD AUTO: 9.8 FL
PO2 BLDA: 106 MMHG (ref 80–100)
POC BE: 0 MMOL/L
POC SATURATED O2: 98 % (ref 95–100)
POC TCO2: 25 MMOL/L (ref 23–27)
POTASSIUM SERPL-SCNC: 4 MMOL/L
PROT SERPL-MCNC: 6.9 G/DL
PROTHROMBIN TIME: 11.2 SEC
RBC # BLD AUTO: 3.08 M/UL
RBC # BLD AUTO: 3.12 M/UL
RBC # BLD AUTO: 3.26 M/UL
RBC # BLD AUTO: 3.76 M/UL
SAMPLE: ABNORMAL
SITE: ABNORMAL
SODIUM SERPL-SCNC: 140 MMOL/L
SP02: 100
VT: 500
WBC # BLD AUTO: 10.51 K/UL
WBC # BLD AUTO: 18.02 K/UL
WBC # BLD AUTO: 8.2 K/UL
WBC # BLD AUTO: 9.01 K/UL

## 2018-11-25 PROCEDURE — 63600175 PHARM REV CODE 636 W HCPCS: Performed by: STUDENT IN AN ORGANIZED HEALTH CARE EDUCATION/TRAINING PROGRAM

## 2018-11-25 PROCEDURE — 99233 SBSQ HOSP IP/OBS HIGH 50: CPT | Mod: GC,,, | Performed by: NEUROLOGICAL SURGERY

## 2018-11-25 PROCEDURE — 99900026 HC AIRWAY MAINTENANCE (STAT)

## 2018-11-25 PROCEDURE — 99900035 HC TECH TIME PER 15 MIN (STAT)

## 2018-11-25 PROCEDURE — 27000221 HC OXYGEN, UP TO 24 HOURS

## 2018-11-25 PROCEDURE — C9113 INJ PANTOPRAZOLE SODIUM, VIA: HCPCS | Performed by: NURSE PRACTITIONER

## 2018-11-25 PROCEDURE — 37799 UNLISTED PX VASCULAR SURGERY: CPT

## 2018-11-25 PROCEDURE — A4216 STERILE WATER/SALINE, 10 ML: HCPCS | Performed by: NURSE PRACTITIONER

## 2018-11-25 PROCEDURE — 99291 CRITICAL CARE FIRST HOUR: CPT | Mod: GC,,, | Performed by: PSYCHIATRY & NEUROLOGY

## 2018-11-25 PROCEDURE — 25000003 PHARM REV CODE 250: Performed by: PSYCHIATRY & NEUROLOGY

## 2018-11-25 PROCEDURE — C9254 INJECTION, LACOSAMIDE: HCPCS | Performed by: PSYCHIATRY & NEUROLOGY

## 2018-11-25 PROCEDURE — 94761 N-INVAS EAR/PLS OXIMETRY MLT: CPT

## 2018-11-25 PROCEDURE — 83605 ASSAY OF LACTIC ACID: CPT

## 2018-11-25 PROCEDURE — 83735 ASSAY OF MAGNESIUM: CPT

## 2018-11-25 PROCEDURE — 84100 ASSAY OF PHOSPHORUS: CPT

## 2018-11-25 PROCEDURE — 20000000 HC ICU ROOM

## 2018-11-25 PROCEDURE — 25000003 PHARM REV CODE 250: Performed by: NURSE PRACTITIONER

## 2018-11-25 PROCEDURE — 94003 VENT MGMT INPAT SUBQ DAY: CPT

## 2018-11-25 PROCEDURE — 85610 PROTHROMBIN TIME: CPT

## 2018-11-25 PROCEDURE — 80053 COMPREHEN METABOLIC PANEL: CPT

## 2018-11-25 PROCEDURE — 84100 ASSAY OF PHOSPHORUS: CPT | Mod: 91

## 2018-11-25 PROCEDURE — 85025 COMPLETE CBC W/AUTO DIFF WBC: CPT | Mod: 91

## 2018-11-25 PROCEDURE — 82803 BLOOD GASES ANY COMBINATION: CPT

## 2018-11-25 PROCEDURE — 63600175 PHARM REV CODE 636 W HCPCS: Performed by: PSYCHIATRY & NEUROLOGY

## 2018-11-25 PROCEDURE — 63600175 PHARM REV CODE 636 W HCPCS: Performed by: NURSE PRACTITIONER

## 2018-11-25 PROCEDURE — 25000003 PHARM REV CODE 250: Performed by: PHYSICIAN ASSISTANT

## 2018-11-25 RX ORDER — LEVETIRACETAM 10 MG/ML
1000 INJECTION INTRAVASCULAR EVERY 12 HOURS
Status: DISCONTINUED | OUTPATIENT
Start: 2018-11-25 | End: 2018-11-25

## 2018-11-25 RX ORDER — SODIUM CHLORIDE, SODIUM LACTATE, POTASSIUM CHLORIDE, CALCIUM CHLORIDE 600; 310; 30; 20 MG/100ML; MG/100ML; MG/100ML; MG/100ML
INJECTION, SOLUTION INTRAVENOUS CONTINUOUS
Status: DISCONTINUED | OUTPATIENT
Start: 2018-11-25 | End: 2018-11-27

## 2018-11-25 RX ORDER — LEVETIRACETAM 500 MG/1
1000 TABLET ORAL 2 TIMES DAILY
Status: DISCONTINUED | OUTPATIENT
Start: 2018-11-25 | End: 2018-11-28

## 2018-11-25 RX ORDER — LACOSAMIDE 10 MG/ML
100 SOLUTION ORAL EVERY 12 HOURS
Status: DISCONTINUED | OUTPATIENT
Start: 2018-11-25 | End: 2018-11-28

## 2018-11-25 RX ORDER — FAMOTIDINE 20 MG/1
20 TABLET, FILM COATED ORAL 2 TIMES DAILY
Status: DISCONTINUED | OUTPATIENT
Start: 2018-11-25 | End: 2018-11-28

## 2018-11-25 RX ORDER — METOPROLOL TARTRATE 25 MG/1
25 TABLET, FILM COATED ORAL 3 TIMES DAILY
Status: DISCONTINUED | OUTPATIENT
Start: 2018-11-25 | End: 2018-11-28

## 2018-11-25 RX ADMIN — CHLORHEXIDINE GLUCONATE 0.12% ORAL RINSE 15 ML: 1.2 LIQUID ORAL at 08:11

## 2018-11-25 RX ADMIN — LEVETIRACETAM 1000 MG: 500 TABLET ORAL at 09:11

## 2018-11-25 RX ADMIN — PANTOPRAZOLE SODIUM 40 MG: 40 INJECTION, POWDER, FOR SOLUTION INTRAVENOUS at 08:11

## 2018-11-25 RX ADMIN — POTASSIUM & SODIUM PHOSPHATES POWDER PACK 280-160-250 MG 2 PACKET: 280-160-250 PACK at 08:11

## 2018-11-25 RX ADMIN — SODIUM CHLORIDE, SODIUM LACTATE, POTASSIUM CHLORIDE, AND CALCIUM CHLORIDE: .6; .31; .03; .02 INJECTION, SOLUTION INTRAVENOUS at 12:11

## 2018-11-25 RX ADMIN — DEXMEDETOMIDINE HYDROCHLORIDE 0.6 MCG/KG/HR: 100 INJECTION, SOLUTION, CONCENTRATE INTRAVENOUS at 06:11

## 2018-11-25 RX ADMIN — CHLORHEXIDINE GLUCONATE 0.12% ORAL RINSE 15 ML: 1.2 LIQUID ORAL at 09:11

## 2018-11-25 RX ADMIN — METOPROLOL TARTRATE 25 MG: 25 TABLET ORAL at 08:11

## 2018-11-25 RX ADMIN — SODIUM CHLORIDE 100 MG: 9 INJECTION, SOLUTION INTRAVENOUS at 09:11

## 2018-11-25 RX ADMIN — LEVETIRACETAM 1500 MG: 15 INJECTION INTRAVENOUS at 08:11

## 2018-11-25 RX ADMIN — POTASSIUM & SODIUM PHOSPHATES POWDER PACK 280-160-250 MG 2 PACKET: 280-160-250 PACK at 05:11

## 2018-11-25 RX ADMIN — HEPARIN SODIUM 5000 UNITS: 5000 INJECTION, SOLUTION INTRAVENOUS; SUBCUTANEOUS at 09:11

## 2018-11-25 RX ADMIN — CHLORHEXIDINE GLUCONATE 0.12% ORAL RINSE 15 ML: 1.2 LIQUID ORAL at 02:11

## 2018-11-25 RX ADMIN — Medication 3 ML: at 02:11

## 2018-11-25 RX ADMIN — CHLORHEXIDINE GLUCONATE 0.12% ORAL RINSE 15 ML: 1.2 LIQUID ORAL at 04:11

## 2018-11-25 RX ADMIN — HEPARIN SODIUM 5000 UNITS: 5000 INJECTION, SOLUTION INTRAVENOUS; SUBCUTANEOUS at 02:11

## 2018-11-25 RX ADMIN — LACOSAMIDE 100 MG: 10 SOLUTION ORAL at 09:11

## 2018-11-25 RX ADMIN — FAMOTIDINE 20 MG: 20 TABLET ORAL at 09:11

## 2018-11-25 RX ADMIN — ASPIRIN 325 MG ORAL TABLET 325 MG: 325 PILL ORAL at 08:11

## 2018-11-25 RX ADMIN — HEPARIN SODIUM 5000 UNITS: 5000 INJECTION, SOLUTION INTRAVENOUS; SUBCUTANEOUS at 05:11

## 2018-11-25 RX ADMIN — Medication 3 ML: at 09:11

## 2018-11-25 RX ADMIN — LEVOTHYROXINE SODIUM 100 MCG: 100 TABLET ORAL at 05:11

## 2018-11-25 RX ADMIN — POTASSIUM & SODIUM PHOSPHATES POWDER PACK 280-160-250 MG 2 PACKET: 280-160-250 PACK at 02:11

## 2018-11-25 RX ADMIN — METOPROLOL TARTRATE 25 MG: 25 TABLET ORAL at 04:11

## 2018-11-25 RX ADMIN — Medication 3 ML: at 05:11

## 2018-11-25 NOTE — PLAN OF CARE
Problem: Patient Care Overview  Goal: Plan of Care Review  Outcome: Ongoing (interventions implemented as appropriate)  POC reviewed with pt and daughter at 1400. Pt's daughter verbalized understanding. Questions and concerns addressed. No acute events today. Restarted TF's, advancing to goal. Pt progressing toward goals. Will continue to monitor. See flowsheets for full assessment and VS info.

## 2018-11-25 NOTE — SUBJECTIVE & OBJECTIVE
Interval History: 11/25: AFVSS, started fighting vent yesterday and required more precedex for sedation, counter-intuitively patient is more brisk on exam today, continued Sz mngt per epilepsy/NCC    Medications:  Continuous Infusions:   dexmedetomidine (PRECEDEX) infusion 0.2 mcg/kg/hr (11/25/18 1209)    lactated ringers 75 mL/hr at 11/25/18 1209     Scheduled Meds:   aspirin  325 mg Per NG tube Daily    chlorhexidine  15 mL Mouth/Throat QID    DULoxetine  60 mg Per NG tube Daily    famotidine  20 mg Per NG tube BID    heparin (porcine)  5,000 Units Subcutaneous Q8H    lacosamide  100 mg Per NG tube Q12H    levETIRAcetam  1,000 mg Per NG tube BID    levothyroxine  100 mcg Per NG tube Before breakfast    metoprolol tartrate  25 mg Per NG tube TID    polyethylene glycol  17 g Per NG tube Daily    senna-docusate 8.6-50 mg  1 tablet Per NG tube Daily    sodium chloride 0.9%  3 mL Intravenous Q8H     PRN Meds:albuterol-ipratropium, magnesium oxide, magnesium oxide, midazolam, ondansetron, potassium chloride 10%, potassium chloride 10%, potassium chloride 10%, potassium, sodium phosphates, potassium, sodium phosphates, potassium, sodium phosphates, sodium chloride 0.9%     Review of Systems    Objective:     Weight: 84.4 kg (186 lb)  Body mass index is 32.95 kg/m².  Vital Signs (Most Recent):  Temp: 98.7 °F (37.1 °C) (11/25/18 1105)  Pulse: 90 (11/25/18 1305)  Resp: 20 (11/25/18 1305)  BP: (!) 156/75 (11/25/18 1305)  SpO2: 100 % (11/25/18 1305) Vital Signs (24h Range):  Temp:  [98.6 °F (37 °C)-99.4 °F (37.4 °C)] 98.7 °F (37.1 °C)  Pulse:  [] 90  Resp:  [14-35] 20  SpO2:  [85 %-100 %] 100 %  BP: ()/() 156/75  Arterial Line BP: ()/(40-95) 157/65     Date 11/25/18 0700 - 11/26/18 0659   Shift 8202-0814 7068-4290 0396-8594 24 Hour Total   INTAKE   I.V.(mL/kg) 434.7(5.2)   434.7(5.2)   NG/GT 85   85   Shift Total(mL/kg) 519.7(6.2)   519.7(6.2)   OUTPUT   Shift Total(mL/kg)       Weight  (kg) 84.4 84.4 84.4 84.4              Vent Mode: Spont  Oxygen Concentration (%):  [40-51] 41  Resp Rate Total:  [13 br/min-37 br/min] 20 br/min  Vt Set:  [500 mL] 500 mL  PEEP/CPAP:  [5 cmH20] 5 cmH20  Pressure Support:  [10 cmH20] 10 cmH20  Mean Airway Pressure:  [8 fwL10-12 cmH20] 8.5 cmH20         NG/OG Tube 11/22/18 Right mouth (Active)   Placement Check placement verified by x-ray 11/23/2018  7:05 PM   Distal Tube Length (cm) 55 11/23/2018  7:05 PM   Tolerance no signs/symptoms of discomfort 11/23/2018  7:05 PM   Securement taped to commercial device 11/23/2018  7:05 PM   Clamp Status/Tolerance clamped 11/23/2018  7:05 PM   Insertion Site Appearance no redness, warmth, tenderness, skin breakdown, drainage 11/23/2018  3:05 PM   Flush/Irrigation flushed w/;water;no resistance met 11/23/2018  7:05 PM   Intake (mL) - Formula Tube Feeding 70 11/23/2018 11:05 AM       Female External Urinary Catheter 11/22/18 1900 (Active)   Skin no redness;no breakdown 11/23/2018  7:05 PM   Tolerance no signs/symptoms of discomfort 11/23/2018  7:05 PM   Suction Continuous suction at 60 mmHg 11/23/2018  7:05 PM   Date of last wick change 11/23/18 11/23/2018  7:05 PM   Time of last wick change 1905 11/23/2018  7:05 PM   Output (mL) 100 mL 11/23/2018  8:00 PM       Neurosurgery Physical Exam    General: On precedex E3VtM6  CNII-XII: PERRL, EOMi, facial symmetry noted  Extremities: FCx4    Significant Labs:  Recent Labs   Lab 11/24/18  0149 11/25/18  0001   * 111*    140   K 4.0 4.0    112*   CO2 21* 20*   BUN 8 10   CREATININE 0.7 0.7   CALCIUM 8.0* 7.7*   MG 2.3  2.3  2.3 2.1     Recent Labs   Lab 11/25/18  0001 11/25/18  0532 11/25/18  1201   WBC 10.51 8.20 9.01   HGB 7.1* 7.1* 7.7*   HCT 25.6* 25.3* 26.3*    328 338     Recent Labs   Lab 11/24/18  0149 11/25/18  0001   INR 1.1  1.1 1.1     Microbiology Results (last 7 days)     Procedure Component Value Units Date/Time    Blood culture (site 1)  [[id number]] Collected:  11/22/18 1712    Order Status:  Completed Specimen:  Blood from Peripheral, Antecubital, Left Updated:  11/25/18 0612     Blood Culture, Routine No Growth to date     Blood Culture, Routine No Growth to date     Blood Culture, Routine No Growth to date    Narrative:       Site # 1, aerobic and anaerobic    Blood culture (site 2) [[id number]] Collected:  11/22/18 1724    Order Status:  Completed Specimen:  Blood from Peripheral, Wrist, Right Updated:  11/25/18 0612     Blood Culture, Routine No Growth to date     Blood Culture, Routine No Growth to date     Blood Culture, Routine No Growth to date    Narrative:       Site # 2, aerobic only    Culture, Respiratory with Gram Stain [[id number]] Collected:  11/23/18 1132    Order Status:  Completed Specimen:  Respiratory from Endotracheal Aspirate Updated:  11/24/18 0909     Respiratory Culture --     YEAST   Few  Identification pending       Gram Stain (Respiratory) Few WBC's     Gram Stain (Respiratory) No organisms seen    Narrative:       Mini-BAL.    Culture, Respiratory with Gram Stain [[id number]] Collected:  11/22/18 1520    Order Status:  Completed Specimen:  Respiratory from Sputum Updated:  11/24/18 0907     Respiratory Culture Normal respiratory rizwan     Gram Stain (Respiratory) <10 epithelial cells per low power field.     Gram Stain (Respiratory) Few WBC's     Gram Stain (Respiratory) No organisms seen    Blood culture [[id number]]     Order Status:  Canceled Specimen:  Blood     Blood culture [[id number]]     Order Status:  Canceled Specimen:  Blood         ABGs:   Recent Labs   Lab 11/24/18  0515 11/25/18  0448   PH 7.443 7.453*   PCO2 37.0 34.6*   PO2 112* 106*   HCO3 25.3 24.2   POCSATURATED 99 98   BE 1 0     Cardiac markers:   Recent Labs   Lab 11/23/18  1539 11/23/18  2300 11/24/18  0610   TROPONINI 0.089* 0.095* 0.087*     CMP:   Recent Labs   Lab 11/24/18  0149 11/25/18  0001   * 111*   CALCIUM 8.0* 7.7*   ALBUMIN 2.5*  2.2*   PROT 7.6 6.9    140   K 4.0 4.0   CO2 21* 20*    112*   BUN 8 10   CREATININE 0.7 0.7   ALKPHOS 64 65   ALT 20 19   AST 52* 39   BILITOT 0.6 0.5     CRP: No results for input(s): CRP in the last 48 hours.  ESR: No results for input(s): POCESR, ERYTHROCYTES in the last 48 hours.  LFTs:   Recent Labs   Lab 11/24/18  0149 11/25/18  0001   ALT 20 19   AST 52* 39   ALKPHOS 64 65   BILITOT 0.6 0.5   PROT 7.6 6.9   ALBUMIN 2.5* 2.2*     Procalcitonin:   No results for input(s): PROCAL in the last 48 hours.    Significant Diagnostics:  I have reviewed all pertinent imaging results/findings within the past 24 hours.

## 2018-11-25 NOTE — PROGRESS NOTES
[facility]  Neurosurgery  Progress Note    Subjective:     History of Present Illness: Melania Malagon is a 73 y.o. year old female with PMHx of HTN, DM, seizure disorder who lives in [place] and transferred to [facility] with SE and cervical frx for care escalation. Unable to obtain hx due to AMS. Per charting, she had seizure at NH followed by mechanical fall. At OSH, she was intubated for airway protection and loaded with AED. Lab was significant for leucocytosis, lactic acidosis , and elevated ammonia. CT head was negative for acute process. C spine CT shows minimally displaced type 1 samuel frx and type 2 odontoid frx without retropulsion or canal compromise. She is on ASA per charting. NSGY consulted for further evaluation and possible intervention.    Post-Op Info:  * No surgery found *         Interval History: 11/25: AFVSS, started fighting vent yesterday and required more precedex for sedation, counter-intuitively patient is more brisk on exam today, continued Sz mngt per epilepsy/NCC    Medications:  Continuous Infusions:   dexmedetomidine (PRECEDEX) infusion 0.2 mcg/kg/hr (11/25/18 1209)    lactated ringers 75 mL/hr at 11/25/18 1209     Scheduled Meds:   aspirin  325 mg Per NG tube Daily    chlorhexidine  15 mL Mouth/Throat QID    DULoxetine  60 mg Per NG tube Daily    famotidine  20 mg Per NG tube BID    heparin (porcine)  5,000 Units Subcutaneous Q8H    lacosamide  100 mg Per NG tube Q12H    levETIRAcetam  1,000 mg Per NG tube BID    levothyroxine  100 mcg Per NG tube Before breakfast    metoprolol tartrate  25 mg Per NG tube TID    polyethylene glycol  17 g Per NG tube Daily    senna-docusate 8.6-50 mg  1 tablet Per NG tube Daily    sodium chloride 0.9%  3 mL Intravenous Q8H     PRN Meds:albuterol-ipratropium, magnesium oxide, magnesium oxide, midazolam, ondansetron, potassium chloride 10%, potassium chloride 10%, potassium chloride 10%, potassium, sodium phosphates, potassium,  sodium phosphates, potassium, sodium phosphates, sodium chloride 0.9%     Review of Systems    Objective:     Weight: 84.4 kg (186 lb)  Body mass index is 32.95 kg/m².  Vital Signs (Most Recent):  Temp: 98.7 °F (37.1 °C) (11/25/18 1105)  Pulse: 90 (11/25/18 1305)  Resp: 20 (11/25/18 1305)  BP: (!) 156/75 (11/25/18 1305)  SpO2: 100 % (11/25/18 1305) Vital Signs (24h Range):  Temp:  [98.6 °F (37 °C)-99.4 °F (37.4 °C)] 98.7 °F (37.1 °C)  Pulse:  [] 90  Resp:  [14-35] 20  SpO2:  [85 %-100 %] 100 %  BP: ()/() 156/75  Arterial Line BP: ()/(40-95) 157/65     Date 11/25/18 0700 - 11/26/18 0659   Shift 9813-9215 0059-6182 5982-2797 24 Hour Total   INTAKE   I.V.(mL/kg) 434.7(5.2)   434.7(5.2)   NG/GT 85   85   Shift Total(mL/kg) 519.7(6.2)   519.7(6.2)   OUTPUT   Shift Total(mL/kg)       Weight (kg) 84.4 84.4 84.4 84.4              Vent Mode: Spont  Oxygen Concentration (%):  [40-51] 41  Resp Rate Total:  [13 br/min-37 br/min] 20 br/min  Vt Set:  [500 mL] 500 mL  PEEP/CPAP:  [5 cmH20] 5 cmH20  Pressure Support:  [10 cmH20] 10 cmH20  Mean Airway Pressure:  [8 qtA88-48 cmH20] 8.5 cmH20         NG/OG Tube 11/22/18 Right mouth (Active)   Placement Check placement verified by x-ray 11/23/2018  7:05 PM   Distal Tube Length (cm) 55 11/23/2018  7:05 PM   Tolerance no signs/symptoms of discomfort 11/23/2018  7:05 PM   Securement taped to commercial device 11/23/2018  7:05 PM   Clamp Status/Tolerance clamped 11/23/2018  7:05 PM   Insertion Site Appearance no redness, warmth, tenderness, skin breakdown, drainage 11/23/2018  3:05 PM   Flush/Irrigation flushed w/;water;no resistance met 11/23/2018  7:05 PM   Intake (mL) - Formula Tube Feeding 70 11/23/2018 11:05 AM       Female External Urinary Catheter 11/22/18 1900 (Active)   Skin no redness;no breakdown 11/23/2018  7:05 PM   Tolerance no signs/symptoms of discomfort 11/23/2018  7:05 PM   Suction Continuous suction at 60 mmHg 11/23/2018  7:05 PM   Date of last  wick change 11/23/18 11/23/2018  7:05 PM   Time of last wick change 1905 11/23/2018  7:05 PM   Output (mL) 100 mL 11/23/2018  8:00 PM       Neurosurgery Physical Exam    General: On precedex E3VtM6  CNII-XII: PERRL, EOMi, facial symmetry noted  Extremities: FCx4    Significant Labs:  Recent Labs   Lab 11/24/18  0149 11/25/18  0001   * 111*    140   K 4.0 4.0    112*   CO2 21* 20*   BUN 8 10   CREATININE 0.7 0.7   CALCIUM 8.0* 7.7*   MG 2.3  2.3  2.3 2.1     Recent Labs   Lab 11/25/18  0001 11/25/18  0532 11/25/18  1201   WBC 10.51 8.20 9.01   HGB 7.1* 7.1* 7.7*   HCT 25.6* 25.3* 26.3*    328 338     Recent Labs   Lab 11/24/18  0149 11/25/18  0001   INR 1.1  1.1 1.1     Microbiology Results (last 7 days)     Procedure Component Value Units Date/Time    Blood culture (site 1) [[id number]] Collected:  11/22/18 1712    Order Status:  Completed Specimen:  Blood from Peripheral, Antecubital, Left Updated:  11/25/18 0612     Blood Culture, Routine No Growth to date     Blood Culture, Routine No Growth to date     Blood Culture, Routine No Growth to date    Narrative:       Site # 1, aerobic and anaerobic    Blood culture (site 2) [[id number]] Collected:  11/22/18 1724    Order Status:  Completed Specimen:  Blood from Peripheral, Wrist, Right Updated:  11/25/18 0612     Blood Culture, Routine No Growth to date     Blood Culture, Routine No Growth to date     Blood Culture, Routine No Growth to date    Narrative:       Site # 2, aerobic only    Culture, Respiratory with Gram Stain [[id number]] Collected:  11/23/18 1132    Order Status:  Completed Specimen:  Respiratory from Endotracheal Aspirate Updated:  11/24/18 0909     Respiratory Culture --     YEAST   Few  Identification pending       Gram Stain (Respiratory) Few WBC's     Gram Stain (Respiratory) No organisms seen    Narrative:       Mini-BAL.    Culture, Respiratory with Gram Stain [[id number]] Collected:  11/22/18 1520    Order Status:   Completed Specimen:  Respiratory from Sputum Updated:  11/24/18 0907     Respiratory Culture Normal respiratory rizwan     Gram Stain (Respiratory) <10 epithelial cells per low power field.     Gram Stain (Respiratory) Few WBC's     Gram Stain (Respiratory) No organisms seen    Blood culture [[id number]]     Order Status:  Canceled Specimen:  Blood     Blood culture [[id number]]     Order Status:  Canceled Specimen:  Blood         ABGs:   Recent Labs   Lab 11/24/18  0515 11/25/18  0448   PH 7.443 7.453*   PCO2 37.0 34.6*   PO2 112* 106*   HCO3 25.3 24.2   POCSATURATED 99 98   BE 1 0     Cardiac markers:   Recent Labs   Lab 11/23/18  1539 11/23/18  2300 11/24/18  0610   TROPONINI 0.089* 0.095* 0.087*     CMP:   Recent Labs   Lab 11/24/18  0149 11/25/18  0001   * 111*   CALCIUM 8.0* 7.7*   ALBUMIN 2.5* 2.2*   PROT 7.6 6.9    140   K 4.0 4.0   CO2 21* 20*    112*   BUN 8 10   CREATININE 0.7 0.7   ALKPHOS 64 65   ALT 20 19   AST 52* 39   BILITOT 0.6 0.5     CRP: No results for input(s): CRP in the last 48 hours.  ESR: No results for input(s): POCESR, ERYTHROCYTES in the last 48 hours.  LFTs:   Recent Labs   Lab 11/24/18  0149 11/25/18  0001   ALT 20 19   AST 52* 39   ALKPHOS 64 65   BILITOT 0.6 0.5   PROT 7.6 6.9   ALBUMIN 2.5* 2.2*     Procalcitonin:   No results for input(s): PROCAL in the last 48 hours.    Significant Diagnostics:  I have reviewed all pertinent imaging results/findings within the past 24 hours.    Assessment/Plan:     Cervical spine fracture    74 yo female admitted to [facility] with NCSE also found to have type I atlas and type II odontoid fractures.    --Continue care per primary team.  --Continue AED regimen per epilepsy and NCC      -Epilepsy note suggests continued generalized epilepsy per EEG  --MRI and CT c-spine reviewed  --WTE when appropriate per NCC  --Continue cervical orthosis in rigid collar at all times.  --[place] fracture is stable, odontoid fracture is unstable may be  amenable to surgical intervention once pt as been medically optimized and has adequate seizure control.  --We will continue to monitor closely, please contact us with any questions or concerns.           Orlando Duran MD  Neurosurgery  [facility]

## 2018-11-25 NOTE — PLAN OF CARE
Problem: Patient Care Overview  Goal: Plan of Care Review  Outcome: Ongoing (interventions implemented as appropriate)  No acute events throughout the day, VS and assessment per flow sheet, patient progressing towards goal as tolerated. Plan to extubate today. TF stopped at 0000. Precedex drip for agitation. Phosphorus replaced per MAR parameters.  Plan of care reviewed with Melania Malagon at 0400, mitchel pt learning, pt intubated. Will continue to monitor.

## 2018-11-25 NOTE — ASSESSMENT & PLAN NOTE
72 yo female admitted to [facility] with NCSE also found to have type I atlas and type II odontoid fractures.    --Continue care per primary team.  --Continue AED regimen per epilepsy and NCC      -Epilepsy note suggests continued generalized epilepsy per EEG  --MRI and CT c-spine reviewed  --WTE when appropriate per NCC  --Continue cervical orthosis in rigid collar at all times.  --[place] fracture is stable, odontoid fracture is unstable may be amenable to surgical intervention once pt as been medically optimized and has adequate seizure control.  --We will continue to monitor closely, please contact us with any questions or concerns.

## 2018-11-26 PROBLEM — N64.89 NIPPLE CRUSTING: Status: ACTIVE | Noted: 2018-11-26

## 2018-11-26 PROBLEM — R45.1 RESTLESSNESS AND AGITATION: Status: ACTIVE | Noted: 2018-11-26

## 2018-11-26 PROBLEM — L82.1 SEBORRHEIC KERATOSES: Status: ACTIVE | Noted: 2018-11-26

## 2018-11-26 PROBLEM — E83.39 HYPOPHOSPHATEMIA: Status: ACTIVE | Noted: 2018-11-26

## 2018-11-26 PROBLEM — R41.82 ALTERED MENTAL STATUS: Status: RESOLVED | Noted: 2018-11-22 | Resolved: 2018-11-26

## 2018-11-26 LAB
ALBUMIN SERPL BCP-MCNC: 2.4 G/DL
ALLENS TEST: ABNORMAL
ALLENS TEST: ABNORMAL
ALP SERPL-CCNC: 71 U/L
ALT SERPL W/O P-5'-P-CCNC: 19 U/L
ANION GAP SERPL CALC-SCNC: 8 MMOL/L
AST SERPL-CCNC: 47 U/L
BACTERIA SPEC AEROBE CULT: NORMAL
BACTERIA SPEC AEROBE CULT: NORMAL
BASOPHILS # BLD AUTO: 0.04 K/UL
BASOPHILS # BLD AUTO: 0.06 K/UL
BASOPHILS # BLD AUTO: 0.08 K/UL
BASOPHILS NFR BLD: 0.6 %
BASOPHILS NFR BLD: 0.9 %
BASOPHILS NFR BLD: 1 %
BILIRUB SERPL-MCNC: 0.5 MG/DL
BUN SERPL-MCNC: 13 MG/DL
CALCIUM SERPL-MCNC: 7.9 MG/DL
CHLORIDE SERPL-SCNC: 112 MMOL/L
CO2 SERPL-SCNC: 21 MMOL/L
CREAT SERPL-MCNC: 0.8 MG/DL
DELSYS: ABNORMAL
DELSYS: ABNORMAL
DIFFERENTIAL METHOD: ABNORMAL
EOSINOPHIL # BLD AUTO: 0 K/UL
EOSINOPHIL # BLD AUTO: 0.1 K/UL
EOSINOPHIL # BLD AUTO: 0.1 K/UL
EOSINOPHIL NFR BLD: 0.6 %
EOSINOPHIL NFR BLD: 1.5 %
EOSINOPHIL NFR BLD: 1.7 %
ERYTHROCYTE [DISTWIDTH] IN BLOOD BY AUTOMATED COUNT: 20.1 %
ERYTHROCYTE [DISTWIDTH] IN BLOOD BY AUTOMATED COUNT: 20.1 %
ERYTHROCYTE [DISTWIDTH] IN BLOOD BY AUTOMATED COUNT: 20.2 %
EST. GFR  (AFRICAN AMERICAN): >60 ML/MIN/1.73 M^2
EST. GFR  (NON AFRICAN AMERICAN): >60 ML/MIN/1.73 M^2
FIO2: 40
GLUCOSE SERPL-MCNC: 144 MG/DL
GRAM STN SPEC: NORMAL
HCO3 UR-SCNC: 24.3 MMOL/L (ref 24–28)
HCO3 UR-SCNC: 24.3 MMOL/L (ref 24–28)
HCT VFR BLD AUTO: 24.8 %
HCT VFR BLD AUTO: 26.7 %
HCT VFR BLD AUTO: 31.5 %
HGB BLD-MCNC: 7.1 G/DL
HGB BLD-MCNC: 7.5 G/DL
HGB BLD-MCNC: 9.2 G/DL
IMM GRANULOCYTES # BLD AUTO: 0.03 K/UL
IMM GRANULOCYTES # BLD AUTO: 0.04 K/UL
IMM GRANULOCYTES # BLD AUTO: 0.05 K/UL
IMM GRANULOCYTES NFR BLD AUTO: 0.4 %
IMM GRANULOCYTES NFR BLD AUTO: 0.6 %
IMM GRANULOCYTES NFR BLD AUTO: 0.6 %
INR PPP: 1.2
LYMPHOCYTES # BLD AUTO: 1.7 K/UL
LYMPHOCYTES # BLD AUTO: 2 K/UL
LYMPHOCYTES # BLD AUTO: 2 K/UL
LYMPHOCYTES NFR BLD: 24.4 %
LYMPHOCYTES NFR BLD: 26 %
LYMPHOCYTES NFR BLD: 29.5 %
MAGNESIUM SERPL-MCNC: 2 MG/DL
MAP: 8
MCH RBC QN AUTO: 23.4 PG
MCH RBC QN AUTO: 23.4 PG
MCH RBC QN AUTO: 23.5 PG
MCHC RBC AUTO-ENTMCNC: 28.1 G/DL
MCHC RBC AUTO-ENTMCNC: 28.6 G/DL
MCHC RBC AUTO-ENTMCNC: 29.2 G/DL
MCV RBC AUTO: 81 FL
MCV RBC AUTO: 82 FL
MCV RBC AUTO: 83 FL
MIN VOL: 10
MODE: ABNORMAL
MODE: ABNORMAL
MONOCYTES # BLD AUTO: 0.7 K/UL
MONOCYTES # BLD AUTO: 0.8 K/UL
MONOCYTES # BLD AUTO: 0.8 K/UL
MONOCYTES NFR BLD: 10 %
MONOCYTES NFR BLD: 10.4 %
MONOCYTES NFR BLD: 11 %
NEUTROPHILS # BLD AUTO: 4 K/UL
NEUTROPHILS # BLD AUTO: 4.3 K/UL
NEUTROPHILS # BLD AUTO: 4.8 K/UL
NEUTROPHILS NFR BLD: 57.5 %
NEUTROPHILS NFR BLD: 60.5 %
NEUTROPHILS NFR BLD: 62.8 %
NRBC BLD-RTO: 0 /100 WBC
PCO2 BLDA: 36.2 MMHG (ref 35–45)
PCO2 BLDA: 38.2 MMHG (ref 35–45)
PEEP: 5
PH SMN: 7.41 [PH] (ref 7.35–7.45)
PH SMN: 7.43 [PH] (ref 7.35–7.45)
PHOSPHATE SERPL-MCNC: 1.7 MG/DL
PLATELET # BLD AUTO: 299 K/UL
PLATELET # BLD AUTO: 336 K/UL
PLATELET # BLD AUTO: 375 K/UL
PMV BLD AUTO: 9.6 FL
PMV BLD AUTO: 9.8 FL
PMV BLD AUTO: 9.9 FL
PO2 BLDA: 101 MMHG (ref 80–100)
PO2 BLDA: 110 MMHG (ref 80–100)
POC BE: 0 MMOL/L
POC BE: 0 MMOL/L
POC SATURATED O2: 98 % (ref 95–100)
POC SATURATED O2: 99 % (ref 95–100)
POC TCO2: 25 MMOL/L (ref 23–27)
POC TCO2: 25 MMOL/L (ref 23–27)
POTASSIUM SERPL-SCNC: 4 MMOL/L
PROT SERPL-MCNC: 7.3 G/DL
PROTHROMBIN TIME: 12.1 SEC
PS: 10
RBC # BLD AUTO: 3.03 M/UL
RBC # BLD AUTO: 3.2 M/UL
RBC # BLD AUTO: 3.91 M/UL
SAMPLE: ABNORMAL
SAMPLE: ABNORMAL
SITE: ABNORMAL
SITE: ABNORMAL
SODIUM SERPL-SCNC: 141 MMOL/L
SP02: 100
SPONT RATE: 16
VOL: 610
WBC # BLD AUTO: 6.89 K/UL
WBC # BLD AUTO: 6.89 K/UL
WBC # BLD AUTO: 7.86 K/UL

## 2018-11-26 PROCEDURE — 99232 SBSQ HOSP IP/OBS MODERATE 35: CPT | Mod: GC,,, | Performed by: NEUROLOGICAL SURGERY

## 2018-11-26 PROCEDURE — 99223 1ST HOSP IP/OBS HIGH 75: CPT | Mod: GC,,, | Performed by: DERMATOLOGY

## 2018-11-26 PROCEDURE — 25000003 PHARM REV CODE 250: Performed by: PHYSICIAN ASSISTANT

## 2018-11-26 PROCEDURE — 85025 COMPLETE CBC W/AUTO DIFF WBC: CPT | Mod: 91

## 2018-11-26 PROCEDURE — 83735 ASSAY OF MAGNESIUM: CPT

## 2018-11-26 PROCEDURE — 85610 PROTHROMBIN TIME: CPT

## 2018-11-26 PROCEDURE — 82803 BLOOD GASES ANY COMBINATION: CPT

## 2018-11-26 PROCEDURE — 93005 ELECTROCARDIOGRAM TRACING: CPT

## 2018-11-26 PROCEDURE — 93010 ELECTROCARDIOGRAM REPORT: CPT | Mod: ,,, | Performed by: INTERNAL MEDICINE

## 2018-11-26 PROCEDURE — 37799 UNLISTED PX VASCULAR SURGERY: CPT

## 2018-11-26 PROCEDURE — 27000221 HC OXYGEN, UP TO 24 HOURS

## 2018-11-26 PROCEDURE — 94150 VITAL CAPACITY TEST: CPT

## 2018-11-26 PROCEDURE — 99900035 HC TECH TIME PER 15 MIN (STAT)

## 2018-11-26 PROCEDURE — 94761 N-INVAS EAR/PLS OXIMETRY MLT: CPT

## 2018-11-26 PROCEDURE — 63600175 PHARM REV CODE 636 W HCPCS: Performed by: PSYCHIATRY & NEUROLOGY

## 2018-11-26 PROCEDURE — 99291 CRITICAL CARE FIRST HOUR: CPT | Mod: GC,,, | Performed by: PSYCHIATRY & NEUROLOGY

## 2018-11-26 PROCEDURE — 94010 BREATHING CAPACITY TEST: CPT

## 2018-11-26 PROCEDURE — 25000003 PHARM REV CODE 250: Performed by: NURSE PRACTITIONER

## 2018-11-26 PROCEDURE — 25000003 PHARM REV CODE 250: Performed by: STUDENT IN AN ORGANIZED HEALTH CARE EDUCATION/TRAINING PROGRAM

## 2018-11-26 PROCEDURE — 20000000 HC ICU ROOM

## 2018-11-26 PROCEDURE — A4216 STERILE WATER/SALINE, 10 ML: HCPCS | Performed by: NURSE PRACTITIONER

## 2018-11-26 PROCEDURE — 99900026 HC AIRWAY MAINTENANCE (STAT)

## 2018-11-26 PROCEDURE — 80053 COMPREHEN METABOLIC PANEL: CPT

## 2018-11-26 PROCEDURE — 84100 ASSAY OF PHOSPHORUS: CPT

## 2018-11-26 PROCEDURE — 25000003 PHARM REV CODE 250: Performed by: PSYCHIATRY & NEUROLOGY

## 2018-11-26 PROCEDURE — 94003 VENT MGMT INPAT SUBQ DAY: CPT

## 2018-11-26 RX ORDER — QUETIAPINE FUMARATE 25 MG/1
25 TABLET, FILM COATED ORAL ONCE
Status: COMPLETED | OUTPATIENT
Start: 2018-11-26 | End: 2018-11-26

## 2018-11-26 RX ORDER — AMOXICILLIN 250 MG
1 CAPSULE ORAL DAILY PRN
Status: DISCONTINUED | OUTPATIENT
Start: 2018-11-26 | End: 2018-12-02

## 2018-11-26 RX ADMIN — LACOSAMIDE 100 MG: 10 SOLUTION ORAL at 08:11

## 2018-11-26 RX ADMIN — POTASSIUM & SODIUM PHOSPHATES POWDER PACK 280-160-250 MG 2 PACKET: 280-160-250 PACK at 12:11

## 2018-11-26 RX ADMIN — LEVETIRACETAM 1000 MG: 500 TABLET ORAL at 09:11

## 2018-11-26 RX ADMIN — QUETIAPINE FUMARATE 25 MG: 25 TABLET ORAL at 04:11

## 2018-11-26 RX ADMIN — LEVETIRACETAM 1000 MG: 500 TABLET ORAL at 08:11

## 2018-11-26 RX ADMIN — HEPARIN SODIUM 5000 UNITS: 5000 INJECTION, SOLUTION INTRAVENOUS; SUBCUTANEOUS at 09:11

## 2018-11-26 RX ADMIN — POTASSIUM & SODIUM PHOSPHATES POWDER PACK 280-160-250 MG 2 PACKET: 280-160-250 PACK at 05:11

## 2018-11-26 RX ADMIN — Medication 3 ML: at 05:11

## 2018-11-26 RX ADMIN — FAMOTIDINE 20 MG: 20 TABLET ORAL at 08:11

## 2018-11-26 RX ADMIN — Medication 3 ML: at 09:11

## 2018-11-26 RX ADMIN — DEXMEDETOMIDINE HYDROCHLORIDE 0.8 MCG/KG/HR: 100 INJECTION, SOLUTION, CONCENTRATE INTRAVENOUS at 05:11

## 2018-11-26 RX ADMIN — METOPROLOL TARTRATE 25 MG: 25 TABLET ORAL at 03:11

## 2018-11-26 RX ADMIN — LEVOTHYROXINE SODIUM 100 MCG: 100 TABLET ORAL at 05:11

## 2018-11-26 RX ADMIN — FAMOTIDINE 20 MG: 20 TABLET ORAL at 09:11

## 2018-11-26 RX ADMIN — CHLORHEXIDINE GLUCONATE 0.12% ORAL RINSE 15 ML: 1.2 LIQUID ORAL at 09:11

## 2018-11-26 RX ADMIN — CHLORHEXIDINE GLUCONATE 0.12% ORAL RINSE 15 ML: 1.2 LIQUID ORAL at 04:11

## 2018-11-26 RX ADMIN — ASPIRIN 325 MG ORAL TABLET 325 MG: 325 PILL ORAL at 08:11

## 2018-11-26 RX ADMIN — HEPARIN SODIUM 5000 UNITS: 5000 INJECTION, SOLUTION INTRAVENOUS; SUBCUTANEOUS at 03:11

## 2018-11-26 RX ADMIN — POTASSIUM & SODIUM PHOSPHATES POWDER PACK 280-160-250 MG 2 PACKET: 280-160-250 PACK at 08:11

## 2018-11-26 RX ADMIN — CHLORHEXIDINE GLUCONATE 0.12% ORAL RINSE 15 ML: 1.2 LIQUID ORAL at 12:11

## 2018-11-26 RX ADMIN — CHLORHEXIDINE GLUCONATE 0.12% ORAL RINSE 15 ML: 1.2 LIQUID ORAL at 08:11

## 2018-11-26 RX ADMIN — LACOSAMIDE 100 MG: 10 SOLUTION ORAL at 09:11

## 2018-11-26 RX ADMIN — HEPARIN SODIUM 5000 UNITS: 5000 INJECTION, SOLUTION INTRAVENOUS; SUBCUTANEOUS at 05:11

## 2018-11-26 NOTE — SUBJECTIVE & OBJECTIVE
Medications:  Continuous Infusions:   dexmedetomidine (PRECEDEX) infusion 0.2 mcg/kg/hr (11/26/18 0600)    lactated ringers 75 mL/hr at 11/26/18 0600     Scheduled Meds:   aspirin  325 mg Per NG tube Daily    chlorhexidine  15 mL Mouth/Throat QID    DULoxetine  60 mg Per NG tube Daily    famotidine  20 mg Per NG tube BID    heparin (porcine)  5,000 Units Subcutaneous Q8H    lacosamide  100 mg Per NG tube Q12H    levETIRAcetam  1,000 mg Per NG tube BID    levothyroxine  100 mcg Per NG tube Before breakfast    metoprolol tartrate  25 mg Per NG tube TID    polyethylene glycol  17 g Per NG tube Daily    senna-docusate 8.6-50 mg  1 tablet Per NG tube Daily    sodium chloride 0.9%  3 mL Intravenous Q8H     PRN Meds:albuterol-ipratropium, magnesium oxide, magnesium oxide, midazolam, ondansetron, potassium chloride 10%, potassium chloride 10%, potassium chloride 10%, potassium, sodium phosphates, potassium, sodium phosphates, potassium, sodium phosphates, sodium chloride 0.9%     Review of Systems    Objective:     Weight: 84.4 kg (186 lb)  Body mass index is 32.95 kg/m².  Vital Signs (Most Recent):  Temp: 98.1 °F (36.7 °C) (11/26/18 0300)  Pulse: 71 (11/26/18 0754)  Resp: 16 (11/26/18 0754)  BP: (!) 152/67 (11/26/18 0600)  SpO2: 99 % (11/26/18 0754) Vital Signs (24h Range):  Temp:  [98.1 °F (36.7 °C)-99.8 °F (37.7 °C)] 98.1 °F (36.7 °C)  Pulse:  [] 71  Resp:  [14-36] 16  SpO2:  [96 %-100 %] 99 %  BP: ()/(51-76) 152/67  Arterial Line BP: ()/(44-95) 165/89                 Vent Mode: Spont  Oxygen Concentration (%):  [40-41] 41  Resp Rate Total:  [14 br/min-35 br/min] 17 br/min  Vt Set:  [500 mL] 500 mL  PEEP/CPAP:  [5 cmH20] 5 cmH20  Pressure Support:  [10 cmH20] 10 cmH20  Mean Airway Pressure:  [7.6 voR41-46 cmH20] 8.1 cmH20         NG/OG Tube 11/22/18 Right mouth (Active)   Placement Check placement verified by x-ray 11/23/2018  7:05 PM   Distal Tube Length (cm) 55 11/23/2018  7:05 PM    Tolerance no signs/symptoms of discomfort 11/23/2018  7:05 PM   Securement taped to commercial device 11/23/2018  7:05 PM   Clamp Status/Tolerance clamped 11/23/2018  7:05 PM   Insertion Site Appearance no redness, warmth, tenderness, skin breakdown, drainage 11/23/2018  3:05 PM   Flush/Irrigation flushed w/;water;no resistance met 11/23/2018  7:05 PM   Intake (mL) - Formula Tube Feeding 70 11/23/2018 11:05 AM       Female External Urinary Catheter 11/22/18 1900 (Active)   Skin no redness;no breakdown 11/23/2018  7:05 PM   Tolerance no signs/symptoms of discomfort 11/23/2018  7:05 PM   Suction Continuous suction at 60 mmHg 11/23/2018  7:05 PM   Date of last wick change 11/23/18 11/23/2018  7:05 PM   Time of last wick change 1905 11/23/2018  7:05 PM   Output (mL) 100 mL 11/23/2018  8:00 PM       Neurosurgery Physical Exam    General: On precedex E3VtM6  CNII-XII: PERRL, EOMi, facial symmetry noted  Extremities: FCx4    Significant Labs:  Recent Labs   Lab 11/25/18  0001 11/26/18  0021   * 144*    141   K 4.0 4.0   * 112*   CO2 20* 21*   BUN 10 13   CREATININE 0.7 0.8   CALCIUM 7.7* 7.9*   MG 2.1 2.0     Recent Labs   Lab 11/25/18  1201 11/25/18  1825 11/26/18  0020   WBC 9.01 18.02* 6.89   HGB 7.7* 8.4* 9.2*   HCT 26.3* 31.3* 31.5*    531* 299     Recent Labs   Lab 11/25/18  0001 11/26/18  0021   INR 1.1 1.2     Microbiology Results (last 7 days)     Procedure Component Value Units Date/Time    Blood culture (site 1) [[id number]] Collected:  11/22/18 1712    Order Status:  Completed Specimen:  Blood from Peripheral, Antecubital, Left Updated:  11/26/18 0612     Blood Culture, Routine No Growth to date     Blood Culture, Routine No Growth to date     Blood Culture, Routine No Growth to date     Blood Culture, Routine No Growth to date    Narrative:       Site # 1, aerobic and anaerobic    Blood culture (site 2) [[id number]] Collected:  11/22/18 2604    Order Status:  Completed Specimen:   Blood from Peripheral, Wrist, Right Updated:  11/26/18 0612     Blood Culture, Routine No Growth to date     Blood Culture, Routine No Growth to date     Blood Culture, Routine No Growth to date     Blood Culture, Routine No Growth to date    Narrative:       Site # 2, aerobic only    Culture, Respiratory with Gram Stain [[id number]] Collected:  11/23/18 1132    Order Status:  Completed Specimen:  Respiratory from Endotracheal Aspirate Updated:  11/24/18 0909     Respiratory Culture --     YEAST   Few  Identification pending       Gram Stain (Respiratory) Few WBC's     Gram Stain (Respiratory) No organisms seen    Narrative:       Mini-BAL.    Culture, Respiratory with Gram Stain [[id number]] Collected:  11/22/18 1520    Order Status:  Completed Specimen:  Respiratory from Sputum Updated:  11/24/18 0907     Respiratory Culture Normal respiratory rizwan     Gram Stain (Respiratory) <10 epithelial cells per low power field.     Gram Stain (Respiratory) Few WBC's     Gram Stain (Respiratory) No organisms seen    Blood culture [[id number]]     Order Status:  Canceled Specimen:  Blood     Blood culture [[id number]]     Order Status:  Canceled Specimen:  Blood         ABGs:   Recent Labs   Lab 11/25/18  0448 11/26/18  0447   PH 7.453* 7.435   PCO2 34.6* 36.2   PO2 106* 110*   HCO3 24.2 24.3   POCSATURATED 98 99   BE 0 0     Cardiac markers:   No results for input(s): CKMB, CPKMB, TROPONINT, TROPONINI, MYOGLOBIN in the last 48 hours.  CMP:   Recent Labs   Lab 11/25/18  0001 11/26/18  0021   * 144*   CALCIUM 7.7* 7.9*   ALBUMIN 2.2* 2.4*   PROT 6.9 7.3    141   K 4.0 4.0   CO2 20* 21*   * 112*   BUN 10 13   CREATININE 0.7 0.8   ALKPHOS 65 71   ALT 19 19   AST 39 47*   BILITOT 0.5 0.5     CRP: No results for input(s): CRP in the last 48 hours.  ESR: No results for input(s): POCESR, ERYTHROCYTES in the last 48 hours.  LFTs:   Recent Labs   Lab 11/25/18  0001 11/26/18  0021   ALT 19 19   AST 39 47*   ALKPHOS 65  71   BILITOT 0.5 0.5   PROT 6.9 7.3   ALBUMIN 2.2* 2.4*     Procalcitonin:   No results for input(s): PROCAL in the last 48 hours.    Significant Diagnostics:  I have reviewed all pertinent imaging results/findings within the past 24 hours.

## 2018-11-26 NOTE — PROGRESS NOTES
[facility]  Neurocritical Care  Progress Note    Admit Date: 11/22/2018  Service Date: 11/26/2018  Length of Stay: 4    Subjective:     Chief Complaint: Cervical spine fracture    History of Present Illness: 73 y F transferred from [place] for cervical spine injury and seizure activity. She has pmh of HTN, DM, seizure disorder and had an unwitnessed seizure (presumed) at nursing home today followed by fall. On arrival by EMS she was responding minimally. Did not require CPR. She had one seizure en route to hospital at [place] and received versed. At ER at OSH, patient had another witnessed seizure with tonic clonic activity in extremities and became unresponsive. She was intubated, and given iv fosphenytoin.  CTH was negative. CT c-spine showed odontoid fracture and C1 fracture. She received versed and fosphenytoin and OSH, when [place] [facility] [place] she was started on ketamine gtt at 0.5 mcg/kg/min. [place] [facility] [place] requiring neurosurg intervention and also seizure monitoring and control.      TO note  Patient had recent pneumonia and GI bleed earlier this month and was treated at OSH. She was discharged on 11/20 with levaquin for pneumonia and returned to NH     Hospital Course: 11/22: admit to Neuro ICU for higher level of care. Neurosurgery consulted for C-spine fracture. MRI c-spine ordered. Continous EEG monitoring. Epilepsy consulted. Loaded keppra and maintenance keppra  11/26: seroquel, derm consult, discussion with NSGY regarding surgical plan, daughter updated at bedside    Review of Symptoms:   Constitutional: Denies fevers or chills.  ENT: no hearing difficulty, no visual changes  Pulmonary: Denies shortness of breath or cough.  Cardiology: Denies chest pain or palpitations.  GI: Denies abdominal pain or constipation.  Neurologic: Denies new weakness, headaches, or paresthesias.   : no dysuria  Musk: no muscle pain, no  joint pain  Psych: no hallucinations    Physical Exam:  GA: Alert, comfortable, no acute distress.   HEENT: No scleral icterus or JVD.   Pulmonary: Clear to auscultation A/L. No wheezing, crackles, or rhonchi.  Cardiac: RRR S1 & S2 w/o rubs/murmurs/gallops.   Abdominal: Bowel sounds present x 4. No appreciable hepatosplenomegaly.  Skin: No jaundice, rashes, or visible lesions. Collar in place  Pulses: 2+ DP bilat    Neuro:  --sedation: precedex  --GCS: [id number]  --Mental Status: awake, follows simple commands   --CN II-XII grossly intact.   --Pupils 3-->2mm, PERRL.   --brainstem: intact  --Motor: 4/5 throughout  --sensory: intact to soft touch and pain throughout  --Reflexes: not tested  --Gait: deferred    Recent Labs   Lab 11/26/18  1233   WBC 7.86   RBC 3.20*   HGB 7.5*   HCT 26.7*   *   MCV 83   MCH 23.4*   MCHC 28.1*     Recent Labs   Lab 11/26/18  0021   CALCIUM 7.9*   PROT 7.3      K 4.0   CO2 21*   *   BUN 13   CREATININE 0.8   ALKPHOS 71   ALT 19   AST 47*   BILITOT 0.5     Recent Labs   Lab 11/26/18  0021   INR 1.2     Vent Mode: Spont  Oxygen Concentration (%):  [40-41] 41  Resp Rate Total:  [13 br/min-35 br/min] 23 br/min  Vt Set:  [500 mL] 500 mL  PEEP/CPAP:  [5 cmH20] 5 cmH20  Pressure Support:  [10 cmH20] 10 cmH20  Mean Airway Pressure:  [7.6 cpU30-09 cmH20] 8.2 cmH20    Temp: 98.9 °F (37.2 °C)  Pulse: 86  Rhythm: sinus tachycardia  BP: (!) 166/76  MAP (mmHg): 109  Resp: 14  SpO2: 100 %  Oxygen Concentration (%): 41  O2 Device (Oxygen Therapy): ventilator  Vent Mode: Spont  Pressure Support: 10 cmH20  PEEP/CPAP: 5 cmH20  Peak Airway Pressure: 16 cmH2O  Mean Airway Pressure: 8.2 cmH20  Plateau Pressure: 0 cmH20    Temp  Min: 98.1 °F (36.7 °C)  Max: 99.8 °F (37.7 °C)  Pulse  Min: 59  Max: 113  BP  Min: 96/52  Max: 186/77  MAP (mmHg)  Min: 71  Max: 111  Resp  Min: 14  Max: 36  SpO2  Min: 96 %  Max: 100 %  Oxygen Concentration (%)  Min: 40  Max: 41    11/25 0701 - 11/26 0700  In: 2692.3  [I.V.:1897.3]  Out: -    Unmeasured Output  Urine Occurrence: 1  Stool Occurrence: 1  Pad Count: 1    Nutrition Prescription Ordered  Current Diet Order: NPO  Last Bowel Movement: 11/26/18    Body mass index is 32.95 kg/m².      I have personally reviewed all labs, imaging, and studies today      Assessment/Plan:     Neuro   * Cervical spine fracture    MRI c-spine without cord compression, contusion or hematoma  CT c-spine with minimally displaced fractures of the posterior arch of the C1 vertebral body in keeping with type 1 Sidney fracture and Type 2 odontoid fracture  C-collar in place  NSGY consulted awaiting plan and date for surgical intervention       Marlen coma scale total score 9-12    Multifactorial  Continue to monitor     Seizures    Seizures at OSH  -received versed and IV fosphenytoin at OSH. Started on ketamine gtt 0.5 mcg/kg/min en route in helicopter  -On arrival to [facility], loaded with keppra 2 g IV and maintenance keppra 1 g IV BID  -continous EEG monitoring without epileptiform activity thus far  -Epilepsy consulted - added vimpat EEG much improved  -continue current regimen     Altered mental status-resolved as of 11/26/2018    Likely due to seizure activity. continous eeg monitoring. keppra load and maintenance keppra. Appreciated epilepsy service recs.  CTH neg at OSH     Psychiatric   Restlessness and agitation    Will attempt to wean off precedex  Appears to get very agitated around 6-7 pm  Will schedule Seroquel after ECG for QTc at 5 om and monitor for improvement to aid in removing physical restraints     Derm   Seborrheic keratoses    Derm consulted     Renal/   Nipple crusting    Appreciate derm recs     Hypophosphatemia    Replete daily prn     Other   Endotracheally intubated    Vent Mode: Spont  Oxygen Concentration (%):  [40-41] 41  Resp Rate Total:  [13 br/min-35 br/min] 23 br/min  Vt Set:  [500 mL] 500 mL  PEEP/CPAP:  [5 cmH20] 5 cmH20  Pressure Support:  [10 cmH20] 10  cmH20  Mean Airway Pressure:  [7.6 jsH37-98 cmH20] 8.2 cmH20  Abg, cxr  Continue vent weaning  Good parameters today though discussed with daughter and patient  Will need to have collar in place even after extubated  And would likely need NGT for now  Will leave intubated and treat to comfort while awaiting a definitive plan from NSGY  If no surgery then will be more aggressive in extubation (daughter agrees with plan)           The patient is being Prophylaxed for:  Venous Thromboembolism with: Chemical  Stress Ulcer with: H2B  Ventilator Pneumonia with: chlorhexidine oral care    Activity Orders          None        Full Code    Gagandeep Amezcua MD  Neurocritical Care  [facility]    Cc time 37 minutes  Critical Care was time spent personally by me on the following activities: development of treatment plan with patient or surrogate and bedside caregivers, discussions with consultants, evaluation of patient's response to treatment, examination of patient, ordering and performing treatments and interventions, ordering and review of laboratory studies, ordering and review of radiographic studies, pulse oximetry, re-evaluation of patient's condition. This critical care time did not overlap with that of any other provider or involve time for any procedures.

## 2018-11-26 NOTE — PLAN OF CARE
Problem: Patient Care Overview  Goal: Plan of Care Review  Outcome: Ongoing (interventions implemented as appropriate)  No acute events throughout the day, VS and assessment per flow sheet, patient progressing towards goal as tolerated. Phosphorus replaced per MAR parameters. Plan of care reviewed with Melania Malagon at 0400, mitchel pt learning, pt intubated. Will continue to monitor.

## 2018-11-26 NOTE — SUBJECTIVE & OBJECTIVE
History reviewed. No pertinent past medical history.    History reviewed. No pertinent surgical history.  Family History     None        Tobacco Use    Smoking status: Never Smoker    Smokeless tobacco: Never Used   Substance and Sexual Activity    Alcohol use: No     Frequency: Never    Drug use: No    Sexual activity: Not Currently     Partners: Male       Review of patient's allergies indicates:   Allergen Reactions    Sulfa (sulfonamide antibiotics)        Medications:  Continuous Infusions:   dexmedetomidine (PRECEDEX) infusion Stopped (11/26/18 0805)    lactated ringers 75 mL/hr at 11/26/18 0905     Scheduled Meds:   aspirin  325 mg Per NG tube Daily    chlorhexidine  15 mL Mouth/Throat QID    DULoxetine  60 mg Per NG tube Daily    famotidine  20 mg Per NG tube BID    heparin (porcine)  5,000 Units Subcutaneous Q8H    lacosamide  100 mg Per NG tube Q12H    levETIRAcetam  1,000 mg Per NG tube BID    levothyroxine  100 mcg Per NG tube Before breakfast    metoprolol tartrate  25 mg Per NG tube TID    QUEtiapine  25 mg Per NG tube Once    sodium chloride 0.9%  3 mL Intravenous Q8H     PRN Meds:albuterol-ipratropium, magnesium oxide, magnesium oxide, midazolam, ondansetron, potassium chloride 10%, potassium chloride 10%, potassium chloride 10%, potassium, sodium phosphates, potassium, sodium phosphates, potassium, sodium phosphates, senna-docusate 8.6-50 mg, sodium chloride 0.9%    Review of Systems   Constitutional: Negative.    Respiratory:        Patient is intubated     Skin: Negative for rash.     Objective:     Vital Signs (Most Recent):  Temp: 98.6 °F (37 °C) (11/26/18 0705)  Pulse: 79 (11/26/18 0905)  Resp: 16 (11/26/18 0905)  BP: (!) 119/56 (11/26/18 0905)  SpO2: 100 % (11/26/18 0905) Vital Signs (24h Range):  Temp:  [98.1 °F (36.7 °C)-99.8 °F (37.7 °C)] 98.6 °F (37 °C)  Pulse:  [] 79  Resp:  [14-36] 16  SpO2:  [96 %-100 %] 100 %  BP: ()/(51-76) 119/56  Arterial Line BP:  ()/(48-95) 140/56     Weight: 84.4 kg (186 lb)  Body mass index is 32.95 kg/m².    Physical Exam   Constitutional: She appears well-developed and well-nourished.   Neurological: She is alert.   Psychiatric: She has a normal mood and affect.   Skin:   Areas Examined (abnormalities noted in diagram):   Head / Face Inspection Performed  Neck Inspection Performed  Chest / Axilla Inspection Performed  Back Inspection Performed  RUE Inspected  LUE Inspection Performed  RLE Inspected  LLE Inspection Performed               Significant Labs: None    Significant Imaging: None

## 2018-11-26 NOTE — HPI
Patient is a 74 y/o woman who fell at the nursing home, sustained a cervical fracture, and was [place] [facility] [place]. Dermatology was consulted for evaluation of a lesion on her right nipple and her back.   History is per nurse as patient is currently intubated. He was cleaning her and changing her gown when he noticed yellow lesion surrounding her right nipple. No treatments. Unsure if symptomatic.   He also noticed 3 spot on her right back. Unsure how long they have been present. No treatments.

## 2018-11-26 NOTE — ASSESSMENT & PLAN NOTE
Will attempt to wean off precedex  Appears to get very agitated around 6-7 pm  Will schedule Seroquel after ECG for QTc at 5 om and monitor for improvement to aid in removing physical restraints

## 2018-11-26 NOTE — ASSESSMENT & PLAN NOTE
MRI c-spine without cord compression, contusion or hematoma  CT c-spine with minimally displaced fractures of the posterior arch of the C1 vertebral body in keeping with type 1 Sidney fracture and Type 2 odontoid fracture  C-collar in place  NSGY consulted awaiting plan and date for surgical intervention

## 2018-11-26 NOTE — ASSESSMENT & PLAN NOTE
Seizures at OSH  -received versed and IV fosphenytoin at OSH. Started on ketamine gtt 0.5 mcg/kg/min en route in helicopter  -On arrival to [facility], loaded with keppra 2 g IV and maintenance keppra 1 g IV BID  -continous EEG monitoring without epileptiform activity thus far  -Epilepsy consulted - added vimpat EEG much improved  -continue current regimen

## 2018-11-26 NOTE — PLAN OF CARE
Problem: Patient Care Overview  Goal: Plan of Care Review  Outcome: Ongoing (interventions implemented as appropriate)  POC reviewed with pt and family at 1400. Pt's daughter verbalized understanding. Questions and concerns addressed. No acute events today. Keeping pt on vent due to potential surgery. Pt progressing toward goals. Will continue to monitor. See flowsheets for full assessment and VS info.

## 2018-11-26 NOTE — PROGRESS NOTES
[facility]  Neurocritical Care  Progress Note    Admit Date: 11/22/2018  Service Date: 11/25/2018  Length of Stay: 3    Subjective:     Chief Complaint: <principal problem not specified>    History of Present Illness: 73 y F transferred from [place] for cervical spine injury and seizure activity. She has pmh of HTN, DM, seizure disorder and had an unwitnessed seizure (presumed) at nursing home today followed by fall. On arrival by EMS she was responding minimally. Did not require CPR. She had one seizure en route to hospital at [place] and received versed. At ER at OSH, patient had another witnessed seizure with tonic clonic activity in extremities and became unresponsive. She was intubated, and given iv fosphenytoin.  CTH was negative. CT c-spine showed odontoid fracture and C1 fracture. She received versed and fosphenytoin and OSH, when [place] [facility] [place] she was started on ketamine gtt at 0.5 mcg/kg/min. [place] [facility] [place] requiring neurosurg intervention and also seizure monitoring and control.      TO note  Patient had recent pneumonia and GI bleed earlier this month and was treated at OSH. She was discharged on 11/20 with levaquin for pneumonia and returned to NH     Hospital Course: 11/22: admit to Neuro ICU for higher level of care. Neurosurgery consulted for C-spine fracture. MRI c-spine ordered. Continous EEG monitoring. Epilepsy consulted. Loaded keppra and maintenance keppra      Assessment/Plan:     Neuro   Marlen coma scale total score 9-12    multifactorial     Altered mental status    Likely due to seizure activity. continous eeg monitoring. keppra load and maintenance keppra. Appreciated epilepsy service recs.  CTH neg at OSH     Seizures    Seizures at OSH  -received versed and IV fosphenytoin at OSH. Started on ketamine gtt 0.5 mcg/kg/min en route in helicopter  -On [place] [facility] [place] with keppra 2 g IV and  maintenance keppra 1 g IV BID  -continous EEG monitoring without epileptiform activity thus far  -Epilepsy consulted - added vimpat EEG much improved  -continue current regimen     Cervical spine fracture    MRI c-spine without cord compression, contusion or hematoma  CT c-spine with minimally displaced fractures of the posterior arch of the C1 vertebral body in keeping with type 1 Sidney fracture and Type 2 odontoid fracture  C-collar in place  NSGY consulted         Oncology   Leukocytosis    Pan cx NGTD  resolved     Other   Endotracheally intubated    Vent Mode: Spont  Oxygen Concentration (%):  [40-41] 41  Resp Rate Total:  [10 br/min-20 br/min] 13 br/min  PEEP/CPAP:  [5 cmH20] 5 cmH20  Pressure Support:  [10 cmH20] 10 cmH20  Mean Airway Pressure:  [7.2 cmH20-8.3 cmH20] 8.2 cmH20  Abg, cxr  Continue vent weaning         The patient is being Prophylaxed for:  Venous Thromboembolism with: Mechanical or Chemical  Stress Ulcer with: H2B  Ventilator Pneumonia with: chlorhexidine oral care    Activity Orders          None        Full Code    Joan Ochoa PA-C  Neurocritical Care  [facility]

## 2018-11-26 NOTE — ASSESSMENT & PLAN NOTE
72 yo female admitted to [facility] with NCSE also found to have type I atlas and type II odontoid fractures.    No acute events overnight. Pt remains intubated. [id number].    --Continue care per primary team.  --Continue AED regimen per primary team.  --Continue cervical orthosis in rigid collar at all times.  --[place] fracture is stable, odontoid fracture is unstable may be amenable to surgical intervention once pt as been medically optimized and has adequate seizure control.  --Will discuss with staff potential timing of surgical intervention pending medical stability.  --We will continue to monitor closely, please contact us with any questions or concerns.

## 2018-11-26 NOTE — PROGRESS NOTES
[facility]  Neurosurgery  Progress Note    Subjective:     History of Present Illness: Melania Malagon is a 73 y.o. year old female with PMHx of HTN, DM, seizure disorder who lives in NH and [facility] [place] [facility] with SE and cervical frx for care escalation. Unable to obtain hx due to AMS. Per charting, she had seizure at NH followed by mechanical fall. At OSH, she was intubated for airway protection and loaded with AED. Lab was significant for leucocytosis, lactic acidosis , and elevated ammonia. CT head was negative for acute process. C spine CT shows minimally displaced type 1 samuel frx and type 2 odontoid frx without retropulsion or canal compromise. She is on ASA per charting. NSGY consulted for further evaluation and possible intervention.    Post-Op Info:  * No surgery found *             Medications:  Continuous Infusions:   dexmedetomidine (PRECEDEX) infusion 0.2 mcg/kg/hr (11/26/18 0600)    lactated ringers 75 mL/hr at 11/26/18 0600     Scheduled Meds:   aspirin  325 mg Per NG tube Daily    chlorhexidine  15 mL Mouth/Throat QID    DULoxetine  60 mg Per NG tube Daily    famotidine  20 mg Per NG tube BID    heparin (porcine)  5,000 Units Subcutaneous Q8H    lacosamide  100 mg Per NG tube Q12H    levETIRAcetam  1,000 mg Per NG tube BID    levothyroxine  100 mcg Per NG tube Before breakfast    metoprolol tartrate  25 mg Per NG tube TID    polyethylene glycol  17 g Per NG tube Daily    senna-docusate 8.6-50 mg  1 tablet Per NG tube Daily    sodium chloride 0.9%  3 mL Intravenous Q8H     PRN Meds:albuterol-ipratropium, magnesium oxide, magnesium oxide, midazolam, ondansetron, potassium chloride 10%, potassium chloride 10%, potassium chloride 10%, potassium, sodium phosphates, potassium, sodium phosphates, potassium, sodium phosphates, sodium chloride 0.9%     Review of Systems    Objective:     Weight: 84.4 kg (186 lb)  Body mass index is 32.95 kg/m².  Vital Signs (Most  Recent):  Temp: 98.1 °F (36.7 °C) (11/26/18 0300)  Pulse: 71 (11/26/18 0754)  Resp: 16 (11/26/18 0754)  BP: (!) 152/67 (11/26/18 0600)  SpO2: 99 % (11/26/18 0754) Vital Signs (24h Range):  Temp:  [98.1 °F (36.7 °C)-99.8 °F (37.7 °C)] 98.1 °F (36.7 °C)  Pulse:  [] 71  Resp:  [14-36] 16  SpO2:  [96 %-100 %] 99 %  BP: ()/(51-76) 152/67  Arterial Line BP: ()/(44-95) 165/89                 Vent Mode: Spont  Oxygen Concentration (%):  [40-41] 41  Resp Rate Total:  [14 br/min-35 br/min] 17 br/min  Vt Set:  [500 mL] 500 mL  PEEP/CPAP:  [5 cmH20] 5 cmH20  Pressure Support:  [10 cmH20] 10 cmH20  Mean Airway Pressure:  [7.6 etZ26-93 cmH20] 8.1 cmH20         NG/OG Tube 11/22/18 Right mouth (Active)   Placement Check placement verified by x-ray 11/23/2018  7:05 PM   Distal Tube Length (cm) 55 11/23/2018  7:05 PM   Tolerance no signs/symptoms of discomfort 11/23/2018  7:05 PM   Securement taped to commercial device 11/23/2018  7:05 PM   Clamp Status/Tolerance clamped 11/23/2018  7:05 PM   Insertion Site Appearance no redness, warmth, tenderness, skin breakdown, drainage 11/23/2018  3:05 PM   Flush/Irrigation flushed w/;water;no resistance met 11/23/2018  7:05 PM   Intake (mL) - Formula Tube Feeding 70 11/23/2018 11:05 AM       Female External Urinary Catheter 11/22/18 1900 (Active)   Skin no redness;no breakdown 11/23/2018  7:05 PM   Tolerance no signs/symptoms of discomfort 11/23/2018  7:05 PM   Suction Continuous suction at 60 mmHg 11/23/2018  7:05 PM   Date of last wick change 11/23/18 11/23/2018  7:05 PM   Time of last wick change 1905 11/23/2018  7:05 PM   Output (mL) 100 mL 11/23/2018  8:00 PM       Neurosurgery Physical Exam    General: On precedex E3VtM6  CNII-XII: PERRL, EOMi, facial symmetry noted  Extremities: FCx4    Significant Labs:  Recent Labs   Lab 11/25/18  0001 11/26/18  0021   * 144*    141   K 4.0 4.0   * 112*   CO2 20* 21*   BUN 10 13   CREATININE 0.7 0.8   CALCIUM 7.7*  7.9*   MG 2.1 2.0     Recent Labs   Lab 11/25/18  1201 11/25/18  1825 11/26/18  0020   WBC 9.01 18.02* 6.89   HGB 7.7* 8.4* 9.2*   HCT 26.3* 31.3* 31.5*    531* 299     Recent Labs   Lab 11/25/18  0001 11/26/18  0021   INR 1.1 1.2     Microbiology Results (last 7 days)     Procedure Component Value Units Date/Time    Blood culture (site 1) [[id number]] Collected:  11/22/18 1712    Order Status:  Completed Specimen:  Blood from Peripheral, Antecubital, Left Updated:  11/26/18 0612     Blood Culture, Routine No Growth to date     Blood Culture, Routine No Growth to date     Blood Culture, Routine No Growth to date     Blood Culture, Routine No Growth to date    Narrative:       Site # 1, aerobic and anaerobic    Blood culture (site 2) [[id number]] Collected:  11/22/18 1724    Order Status:  Completed Specimen:  Blood from Peripheral, Wrist, Right Updated:  11/26/18 0612     Blood Culture, Routine No Growth to date     Blood Culture, Routine No Growth to date     Blood Culture, Routine No Growth to date     Blood Culture, Routine No Growth to date    Narrative:       Site # 2, aerobic only    Culture, Respiratory with Gram Stain [[id number]] Collected:  11/23/18 1132    Order Status:  Completed Specimen:  Respiratory from Endotracheal Aspirate Updated:  11/24/18 0909     Respiratory Culture --     YEAST   Few  Identification pending       Gram Stain (Respiratory) Few WBC's     Gram Stain (Respiratory) No organisms seen    Narrative:       Mini-BAL.    Culture, Respiratory with Gram Stain [[id number]] Collected:  11/22/18 1520    Order Status:  Completed Specimen:  Respiratory from Sputum Updated:  11/24/18 0907     Respiratory Culture Normal respiratory rizwan     Gram Stain (Respiratory) <10 epithelial cells per low power field.     Gram Stain (Respiratory) Few WBC's     Gram Stain (Respiratory) No organisms seen    Blood culture [[id number]]     Order Status:  Canceled Specimen:  Blood     Blood culture [[id number]]      Order Status:  Canceled Specimen:  Blood         ABGs:   Recent Labs   Lab 11/25/18  0448 11/26/18  0447   PH 7.453* 7.435   PCO2 34.6* 36.2   PO2 106* 110*   HCO3 24.2 24.3   POCSATURATED 98 99   BE 0 0     Cardiac markers:   No results for input(s): CKMB, CPKMB, TROPONINT, TROPONINI, MYOGLOBIN in the last 48 hours.  CMP:   Recent Labs   Lab 11/25/18  0001 11/26/18  0021   * 144*   CALCIUM 7.7* 7.9*   ALBUMIN 2.2* 2.4*   PROT 6.9 7.3    141   K 4.0 4.0   CO2 20* 21*   * 112*   BUN 10 13   CREATININE 0.7 0.8   ALKPHOS 65 71   ALT 19 19   AST 39 47*   BILITOT 0.5 0.5     CRP: No results for input(s): CRP in the last 48 hours.  ESR: No results for input(s): POCESR, ERYTHROCYTES in the last 48 hours.  LFTs:   Recent Labs   Lab 11/25/18  0001 11/26/18  0021   ALT 19 19   AST 39 47*   ALKPHOS 65 71   BILITOT 0.5 0.5   PROT 6.9 7.3   ALBUMIN 2.2* 2.4*     Procalcitonin:   No results for input(s): PROCAL in the last 48 hours.    Significant Diagnostics:  I have reviewed all pertinent imaging results/findings within the past 24 hours.    Assessment/Plan:     Cervical spine fracture    74 yo female admitted to [facility] with NCSE also found to have type I atlas and type II odontoid fractures.    No acute events overnight. Pt remains intubated. E4VTM6.    --Continue care per primary team.  --Continue AED regimen per primary team.  --Continue cervical orthosis in rigid collar at all times.  --[place] fracture is stable, odontoid fracture is unstable may be amenable to surgical intervention once pt as been medically optimized and has adequate seizure control.  --Will discuss with staff potential timing of surgical intervention pending medical stability.  --We will continue to monitor closely, please contact us with any questions or concerns.           Ton Barry MD  Neurosurgery  [facility]

## 2018-11-26 NOTE — ASSESSMENT & PLAN NOTE
Likely due to seizure activity. continous eeg monitoring. keppra load and maintenance keppra. Appreciated epilepsy service recs.  CTH neg at OSH

## 2018-11-26 NOTE — ASSESSMENT & PLAN NOTE
Vent Mode: Spont  Oxygen Concentration (%):  [40-41] 41  Resp Rate Total:  [13 br/min-35 br/min] 23 br/min  Vt Set:  [500 mL] 500 mL  PEEP/CPAP:  [5 cmH20] 5 cmH20  Pressure Support:  [10 cmH20] 10 cmH20  Mean Airway Pressure:  [7.6 kuL75-34 cmH20] 8.2 cmH20  Abg, cxr  Continue vent weaning  Good parameters today though discussed with daughter and patient  Will need to have collar in place even after extubated  And would likely need NGT for now  Will leave intubated and treat to comfort while awaiting a definitive plan from NSGY  If no surgery then will be more aggressive in extubation (daughter agrees with plan)

## 2018-11-27 PROBLEM — I47.20 V-TACH: Status: ACTIVE | Noted: 2018-11-27

## 2018-11-27 LAB
ALBUMIN SERPL BCP-MCNC: 2.4 G/DL
ALLENS TEST: ABNORMAL
ALP SERPL-CCNC: 73 U/L
ALT SERPL W/O P-5'-P-CCNC: 17 U/L
ANION GAP SERPL CALC-SCNC: 5 MMOL/L
AST SERPL-CCNC: 33 U/L
BASOPHILS # BLD AUTO: 0.05 K/UL
BASOPHILS NFR BLD: 0.7 %
BILIRUB SERPL-MCNC: 0.4 MG/DL
BUN SERPL-MCNC: 13 MG/DL
CALCIUM SERPL-MCNC: 8.2 MG/DL
CHLORIDE SERPL-SCNC: 112 MMOL/L
CO2 SERPL-SCNC: 25 MMOL/L
CREAT SERPL-MCNC: 0.7 MG/DL
DELSYS: ABNORMAL
DIFFERENTIAL METHOD: ABNORMAL
EOSINOPHIL # BLD AUTO: 0.2 K/UL
EOSINOPHIL NFR BLD: 2.3 %
ERYTHROCYTE [DISTWIDTH] IN BLOOD BY AUTOMATED COUNT: 20.3 %
ERYTHROCYTE [SEDIMENTATION RATE] IN BLOOD BY WESTERGREN METHOD: 19 MM/H
EST. GFR  (AFRICAN AMERICAN): >60 ML/MIN/1.73 M^2
EST. GFR  (NON AFRICAN AMERICAN): >60 ML/MIN/1.73 M^2
FIO2: 40
GLUCOSE SERPL-MCNC: 128 MG/DL
HCO3 UR-SCNC: 31.4 MMOL/L (ref 24–28)
HCT VFR BLD AUTO: 25.5 %
HGB BLD-MCNC: 7.2 G/DL
IMM GRANULOCYTES # BLD AUTO: 0.03 K/UL
IMM GRANULOCYTES NFR BLD AUTO: 0.4 %
LYMPHOCYTES # BLD AUTO: 2.5 K/UL
LYMPHOCYTES NFR BLD: 34 %
MAGNESIUM SERPL-MCNC: 2.1 MG/DL
MCH RBC QN AUTO: 23.2 PG
MCHC RBC AUTO-ENTMCNC: 28.2 G/DL
MCV RBC AUTO: 82 FL
MODE: ABNORMAL
MONOCYTES # BLD AUTO: 0.8 K/UL
MONOCYTES NFR BLD: 10.8 %
NEUTROPHILS # BLD AUTO: 3.9 K/UL
NEUTROPHILS NFR BLD: 51.8 %
NRBC BLD-RTO: 0 /100 WBC
PCO2 BLDA: 41.9 MMHG (ref 35–45)
PH SMN: 7.48 [PH] (ref 7.35–7.45)
PHOSPHATE SERPL-MCNC: 2 MG/DL
PHOSPHATE SERPL-MCNC: 2.7 MG/DL
PLATELET # BLD AUTO: 343 K/UL
PMV BLD AUTO: 9.3 FL
PO2 BLDA: 106 MMHG (ref 80–100)
POC BE: 8 MMOL/L
POC SATURATED O2: 98 % (ref 95–100)
POC TCO2: 33 MMOL/L (ref 23–27)
POTASSIUM SERPL-SCNC: 4 MMOL/L
PROT SERPL-MCNC: 7.5 G/DL
RBC # BLD AUTO: 3.11 M/UL
SAMPLE: ABNORMAL
SITE: ABNORMAL
SODIUM SERPL-SCNC: 142 MMOL/L
SP02: 100
WBC # BLD AUTO: 7.47 K/UL

## 2018-11-27 PROCEDURE — 25000003 PHARM REV CODE 250: Performed by: PHYSICIAN ASSISTANT

## 2018-11-27 PROCEDURE — 25000003 PHARM REV CODE 250: Performed by: NURSE PRACTITIONER

## 2018-11-27 PROCEDURE — 85025 COMPLETE CBC W/AUTO DIFF WBC: CPT

## 2018-11-27 PROCEDURE — 94003 VENT MGMT INPAT SUBQ DAY: CPT

## 2018-11-27 PROCEDURE — 93010 ELECTROCARDIOGRAM REPORT: CPT | Mod: ,,, | Performed by: INTERNAL MEDICINE

## 2018-11-27 PROCEDURE — 94761 N-INVAS EAR/PLS OXIMETRY MLT: CPT

## 2018-11-27 PROCEDURE — 99900026 HC AIRWAY MAINTENANCE (STAT)

## 2018-11-27 PROCEDURE — 94150 VITAL CAPACITY TEST: CPT

## 2018-11-27 PROCEDURE — 25000003 PHARM REV CODE 250: Performed by: PSYCHIATRY & NEUROLOGY

## 2018-11-27 PROCEDURE — 93005 ELECTROCARDIOGRAM TRACING: CPT

## 2018-11-27 PROCEDURE — 82803 BLOOD GASES ANY COMBINATION: CPT

## 2018-11-27 PROCEDURE — 37799 UNLISTED PX VASCULAR SURGERY: CPT

## 2018-11-27 PROCEDURE — 63600175 PHARM REV CODE 636 W HCPCS: Performed by: PSYCHIATRY & NEUROLOGY

## 2018-11-27 PROCEDURE — 63600175 PHARM REV CODE 636 W HCPCS: Performed by: STUDENT IN AN ORGANIZED HEALTH CARE EDUCATION/TRAINING PROGRAM

## 2018-11-27 PROCEDURE — 80053 COMPREHEN METABOLIC PANEL: CPT

## 2018-11-27 PROCEDURE — 99900035 HC TECH TIME PER 15 MIN (STAT)

## 2018-11-27 PROCEDURE — 84100 ASSAY OF PHOSPHORUS: CPT | Mod: 91

## 2018-11-27 PROCEDURE — 27000221 HC OXYGEN, UP TO 24 HOURS

## 2018-11-27 PROCEDURE — 99291 CRITICAL CARE FIRST HOUR: CPT | Mod: GC,,, | Performed by: PSYCHIATRY & NEUROLOGY

## 2018-11-27 PROCEDURE — 84100 ASSAY OF PHOSPHORUS: CPT

## 2018-11-27 PROCEDURE — A4216 STERILE WATER/SALINE, 10 ML: HCPCS | Performed by: NURSE PRACTITIONER

## 2018-11-27 PROCEDURE — 94010 BREATHING CAPACITY TEST: CPT

## 2018-11-27 PROCEDURE — 20000000 HC ICU ROOM

## 2018-11-27 PROCEDURE — 83735 ASSAY OF MAGNESIUM: CPT

## 2018-11-27 RX ORDER — QUETIAPINE FUMARATE 25 MG/1
50 TABLET, FILM COATED ORAL DAILY
Status: DISCONTINUED | OUTPATIENT
Start: 2018-11-27 | End: 2018-11-28

## 2018-11-27 RX ORDER — CHLORHEXIDINE GLUCONATE ORAL RINSE 1.2 MG/ML
15 SOLUTION DENTAL 2 TIMES DAILY
Status: DISCONTINUED | OUTPATIENT
Start: 2018-11-27 | End: 2018-11-30

## 2018-11-27 RX ADMIN — METOPROLOL TARTRATE 25 MG: 25 TABLET ORAL at 08:11

## 2018-11-27 RX ADMIN — DEXMEDETOMIDINE HYDROCHLORIDE 0.3 MCG/KG/HR: 100 INJECTION, SOLUTION, CONCENTRATE INTRAVENOUS at 03:11

## 2018-11-27 RX ADMIN — HEPARIN SODIUM 5000 UNITS: 5000 INJECTION, SOLUTION INTRAVENOUS; SUBCUTANEOUS at 05:11

## 2018-11-27 RX ADMIN — POTASSIUM & SODIUM PHOSPHATES POWDER PACK 280-160-250 MG 2 PACKET: 280-160-250 PACK at 05:11

## 2018-11-27 RX ADMIN — Medication 3 ML: at 05:11

## 2018-11-27 RX ADMIN — LACOSAMIDE 100 MG: 10 SOLUTION ORAL at 08:11

## 2018-11-27 RX ADMIN — CHLORHEXIDINE GLUCONATE 0.12% ORAL RINSE 15 ML: 1.2 LIQUID ORAL at 09:11

## 2018-11-27 RX ADMIN — ASPIRIN 325 MG ORAL TABLET 325 MG: 325 PILL ORAL at 08:11

## 2018-11-27 RX ADMIN — HEPARIN SODIUM 5000 UNITS: 5000 INJECTION, SOLUTION INTRAVENOUS; SUBCUTANEOUS at 02:11

## 2018-11-27 RX ADMIN — LACOSAMIDE 100 MG: 10 SOLUTION ORAL at 09:11

## 2018-11-27 RX ADMIN — METOPROLOL TARTRATE 25 MG: 25 TABLET ORAL at 02:11

## 2018-11-27 RX ADMIN — FAMOTIDINE 20 MG: 20 TABLET ORAL at 08:11

## 2018-11-27 RX ADMIN — POTASSIUM & SODIUM PHOSPHATES POWDER PACK 280-160-250 MG 2 PACKET: 280-160-250 PACK at 12:11

## 2018-11-27 RX ADMIN — LEVETIRACETAM 1000 MG: 500 TABLET ORAL at 08:11

## 2018-11-27 RX ADMIN — Medication 3 ML: at 09:11

## 2018-11-27 RX ADMIN — FAMOTIDINE 20 MG: 20 TABLET ORAL at 09:11

## 2018-11-27 RX ADMIN — CHLORHEXIDINE GLUCONATE 0.12% ORAL RINSE 15 ML: 1.2 LIQUID ORAL at 08:11

## 2018-11-27 RX ADMIN — CHLORHEXIDINE GLUCONATE 0.12% ORAL RINSE 15 ML: 1.2 LIQUID ORAL at 12:11

## 2018-11-27 RX ADMIN — POTASSIUM & SODIUM PHOSPHATES POWDER PACK 280-160-250 MG 2 PACKET: 280-160-250 PACK at 08:11

## 2018-11-27 RX ADMIN — PSYLLIUM HUSK 1 PACKET: 3.4 POWDER ORAL at 12:11

## 2018-11-27 RX ADMIN — METOPROLOL TARTRATE 25 MG: 25 TABLET ORAL at 09:11

## 2018-11-27 RX ADMIN — LEVETIRACETAM 1000 MG: 500 TABLET ORAL at 09:11

## 2018-11-27 RX ADMIN — HEPARIN SODIUM 5000 UNITS: 5000 INJECTION, SOLUTION INTRAVENOUS; SUBCUTANEOUS at 09:11

## 2018-11-27 RX ADMIN — LEVOTHYROXINE SODIUM 100 MCG: 100 TABLET ORAL at 05:11

## 2018-11-27 RX ADMIN — QUETIAPINE FUMARATE 50 MG: 25 TABLET ORAL at 04:11

## 2018-11-27 RX ADMIN — Medication 3 ML: at 02:11

## 2018-11-27 NOTE — ASSESSMENT & PLAN NOTE
Will attempt to wean off precedex  Appears to get very agitated around 6-7 pm  Will obtain new ECG for QTc and increase seroquel today to 50mg dialy at 1400

## 2018-11-27 NOTE — PLAN OF CARE
Problem: Patient Care Overview  Goal: Plan of Care Review  Outcome: Ongoing (interventions implemented as appropriate)  POC reviewed with pt and family at 1400. Pt's daughter verbalized understanding. Questions and concerns addressed. Pt had a run of V tach at 0936, pt then went back into NSR. No other instances noted. Pt also became bradycardic, pads were placed but was self resolved. Continuing to monitor. Potential extubation tomorrow morning. Pt progressing toward goals. Will continue to monitor. See flowsheets for full assessment and VS info.

## 2018-11-27 NOTE — ASSESSMENT & PLAN NOTE
Vent Mode: Spont  Oxygen Concentration (%):  [] 41  Resp Rate Total:  [13 br/min-26 br/min] 20 br/min  PEEP/CPAP:  [5 cmH20] 5 cmH20  Pressure Support:  [10 cmH20] 10 cmH20  Mean Airway Pressure:  [7.5 cmH20-9.2 cmH20] 8.3 cmH20  Abg, cxr  Continue vent weaning  Good parameters today though discussed with daughter and patient  Will need to have collar in place even after extubated  And would likely need NGT for now  Will leave intubated and treat to comfort while awaiting a definitive plan from NSGY  If no surgery then will be more aggressive in extubation (daughter agrees with plan)

## 2018-11-27 NOTE — ASSESSMENT & PLAN NOTE
Brief run of Vtach this morning  No hemodynamic instability  Then later bradycardia  Wean precedex off  Repeat 12 lead  Monitor closely

## 2018-11-27 NOTE — PROGRESS NOTES
[facility]  Neurocritical Care  Progress Note    Admit Date: 11/22/2018  Service Date: 11/27/2018  Length of Stay: 5    Subjective:     Chief Complaint: Cervical spine fracture    History of Present Illness: 73 y F transferred from [place] for cervical spine injury and seizure activity. She has pmh of HTN, DM, seizure disorder and had an unwitnessed seizure (presumed) at nursing home today followed by fall. On arrival by EMS she was responding minimally. Did not require CPR. She had one seizure en route to hospital at [facility] [facility] and received versed. At ER at OSH, patient had another witnessed seizure with tonic clonic activity in extremities and became unresponsive. She was intubated, and given iv fosphenytoin.  CTH was negative. CT c-spine showed odontoid fracture and C1 fracture. She received versed and fosphenytoin and OSH, when she was transferred by helicopter to [facility], en route she was started on ketamine gtt at 0.5 mcg/kg/min. She was transferred to [facility] for cervical fracture requiring neurosurg intervention and also seizure monitoring and control.      TO note  Patient had recent pneumonia and GI bleed earlier this month and was treated at OSH. She was discharged on 11/20 with levaquin for pneumonia and returned to NH     Hospital Course: 11/22: admit to Neuro ICU for higher level of care. Neurosurgery consulted for C-spine fracture. MRI c-spine ordered. Continous EEG monitoring. Epilepsy consulted. Loaded keppra and maintenance keppra  11/26: seroquel, derm consult, discussion with NSGY regarding surgical plan, daughter updated at bedside  11/27: ECG, increase seroquel, Vtach this morning, then bradycardia, wean off precedex, consult nutrition for TF change, Husks x 3 days, obtain smaller MJcollar    Hospital Course: 11/22: admit to Neuro ICU for higher level of care. Neurosurgery consulted for C-spine fracture. MRI c-spine ordered. Continous EEG monitoring. Epilepsy consulted. [facility]  and [facility]  11/26: seroquel, derm consult, discussion with NSGY regarding surgical plan, daughter updated at bedside     Review of Symptoms:   Constitutional: Denies fevers or chills.  ENT: no hearing difficulty, no visual changes  Pulmonary: Denies shortness of breath or cough.  Cardiology: Denies chest pain or palpitations.  GI: Denies abdominal pain or constipation.  Neurologic: Denies new weakness, headaches, or paresthesias.   : no dysuria  Musk: no muscle pain, no joint pain  Psych: no hallucinations     Physical Exam:  GA: Alert, comfortable, no acute distress.   HEENT: No scleral icterus or JVD.   Pulmonary: Clear to auscultation A/L. No wheezing, crackles, or rhonchi.  Cardiac: RRR S1 & S2 w/o rubs/murmurs/gallops.   Abdominal: Bowel sounds present x 4. No appreciable hepatosplenomegaly.  Skin: No jaundice, rashes, or visible lesions. Collar in place  Pulses: 2+ DP bilat     Neuro:  --sedation: precedex  --GCS: N1G7bV4  --Mental Status: awake, follows simple commands   --CN II-XII grossly intact.   --Pupils 3-->2mm, PERRL.   --brainstem: intact  --Motor: 4/5 throughout  --sensory: intact to soft touch and pain throughout  --Reflexes: not tested  --Gait: deferred     Recent Labs   Lab 11/27/18  0112   WBC 7.47   RBC 3.11*   HGB 7.2*   HCT 25.5*      MCV 82   MCH 23.2*   MCHC 28.2*     Recent Labs   Lab 11/27/18  0112   CALCIUM 8.2*   PROT 7.5      K 4.0   CO2 25   *   BUN 13   CREATININE 0.7   ALKPHOS 73   ALT 17   AST 33   BILITOT 0.4     No results for input(s): PT, INR, APTT in the last 24 hours.  Vent Mode: Spont  Oxygen Concentration (%):  [] 41  Resp Rate Total:  [13 br/min-26 br/min] 20 br/min  PEEP/CPAP:  [5 cmH20] 5 cmH20  Pressure Support:  [10 cmH20] 10 cmH20  Mean Airway Pressure:  [7.5 cmH20-9.2 cmH20] 8.3 cmH20    Temp: 98.2 °F (36.8 °C)  Pulse: 78  Rhythm: normal sinus rhythm  BP: (!) 143/84  MAP (mmHg): 96  Resp: 19  SpO2: 100 %  Oxygen Concentration  (%): 41  O2 Device (Oxygen Therapy): ventilator  Vent Mode: Spont  Pressure Support: 10 cmH20  PEEP/CPAP: 5 cmH20  Peak Airway Pressure: 16 cmH2O  Mean Airway Pressure: 8.3 cmH20  Plateau Pressure: 0 cmH20    Temp  Min: 98.2 °F (36.8 °C)  Max: 98.9 °F (37.2 °C)  Pulse  Min: 55  Max: 113  BP  Min: 93/55  Max: 186/77  MAP (mmHg)  Min: 72  Max: 127  Resp  Min: 9  Max: 27  SpO2  Min: 100 %  Max: 100 %  Oxygen Concentration (%)  Min: 40  Max: 100    11/26 0701 - 11/27 0700  In: 3338.5 [I.V.:1873.5]  Out: 800 [Urine:300]   Unmeasured Output  Urine Occurrence: 1  Stool Occurrence: 1  Pad Count: 1    Nutrition Prescription Ordered  Current Diet Order: NPO  Last Bowel Movement: 11/27/18    Body mass index is 32.95 kg/m².      I have personally reviewed all labs, imaging, and studies today      Assessment/Plan:     Neuro   * Cervical spine fracture    MRI c-spine without cord compression, contusion or hematoma  CT c-spine with minimally displaced fractures of the posterior arch of the C1 vertebral body in keeping with type 1 [place] fracture and Type 2 odontoid fracture  C-collar in place  NSGY consulted awaiting plan and date for surgical intervention  Need smaller MJ collar, not fitting properly, too tall. NSGY made aware       Marlen coma scale total score 9-12    Multifactorial  Continue to monitor     Psychiatric   Restlessness and agitation    Will attempt to wean off precedex  Appears to get very agitated around 6-7 pm  Will obtain new ECG for QTc and increase seroquel today to 50mg dialy at 1400     Cardiac/Vascular   V-tach    Brief run of Vtach this morning  No hemodynamic instability  Then later bradycardia  Wean precedex off  Repeat 12 lead  Monitor closely     Other   Endotracheally intubated    Vent Mode: Spont  Oxygen Concentration (%):  [] 41  Resp Rate Total:  [13 br/min-26 br/min] 20 br/min  PEEP/CPAP:  [5 cmH20] 5 cmH20  Pressure Support:  [10 cmH20] 10 cmH20  Mean Airway Pressure:  [7.5 cmH20-9.2  cmH20] 8.3 cmH20  Abg, cxr  Continue vent weaning  Good parameters today though discussed with daughter and patient  Will need to have collar in place even after extubated  And would likely need NGT for now  Will leave intubated and treat to comfort while awaiting a definitive plan from NSGY  If no surgery then will be more aggressive in extubation (daughter agrees with plan)           The patient is being Prophylaxed for:  Venous Thromboembolism with: Chemical  Stress Ulcer with: H2B  Ventilator Pneumonia with: chlorhexidine oral care    Activity Orders          None        Full Code    Gagandeep Amezcua MD  Neurocritical Care  [facility]    Cc time 33 minutes  Critical Care was time spent personally by me on the following activities: development of treatment plan with patient or surrogate and bedside caregivers, discussions with consultants, evaluation of patient's response to treatment, examination of patient, ordering and performing treatments and interventions, ordering and review of laboratory studies, ordering and review of radiographic studies, pulse oximetry, re-evaluation of patient's condition. This critical care time did not overlap with that of any other provider or involve time for any procedures.

## 2018-11-27 NOTE — ASSESSMENT & PLAN NOTE
MRI c-spine without cord compression, contusion or hematoma  CT c-spine with minimally displaced fractures of the posterior arch of the C1 vertebral body in keeping with type 1 Sidney fracture and Type 2 odontoid fracture  C-collar in place  NSGY consulted awaiting plan and date for surgical intervention  Need smaller MJ collar, not fitting properly, too tall. NSGY made aware

## 2018-11-28 PROBLEM — R00.1 BRADYCARDIA: Status: ACTIVE | Noted: 2018-11-28

## 2018-11-28 PROBLEM — N64.89 NIPPLE CRUSTING: Status: RESOLVED | Noted: 2018-11-26 | Resolved: 2018-11-28

## 2018-11-28 LAB
ABO + RH BLD: NORMAL
ALBUMIN SERPL BCP-MCNC: 2.2 G/DL
ALLENS TEST: ABNORMAL
ALP SERPL-CCNC: 70 U/L
ALT SERPL W/O P-5'-P-CCNC: 12 U/L
ANION GAP SERPL CALC-SCNC: 7 MMOL/L
AST SERPL-CCNC: 30 U/L
BACTERIA BLD CULT: NORMAL
BACTERIA BLD CULT: NORMAL
BASOPHILS # BLD AUTO: 0.05 K/UL
BASOPHILS # BLD AUTO: 0.07 K/UL
BASOPHILS NFR BLD: 0.7 %
BASOPHILS NFR BLD: 0.7 %
BILIRUB SERPL-MCNC: 0.4 MG/DL
BLD GP AB SCN CELLS X3 SERPL QL: NORMAL
BLD PROD TYP BPU: NORMAL
BLOOD UNIT EXPIRATION DATE: NORMAL
BLOOD UNIT TYPE CODE: 5100
BLOOD UNIT TYPE: NORMAL
BUN SERPL-MCNC: 15 MG/DL
CALCIUM SERPL-MCNC: 8.1 MG/DL
CHLORIDE SERPL-SCNC: 113 MMOL/L
CO2 SERPL-SCNC: 25 MMOL/L
CODING SYSTEM: NORMAL
CREAT SERPL-MCNC: 0.7 MG/DL
DELSYS: ABNORMAL
DIFFERENTIAL METHOD: ABNORMAL
DIFFERENTIAL METHOD: ABNORMAL
DISPENSE STATUS: NORMAL
EOSINOPHIL # BLD AUTO: 0.1 K/UL
EOSINOPHIL # BLD AUTO: 0.2 K/UL
EOSINOPHIL NFR BLD: 0.6 %
EOSINOPHIL NFR BLD: 3.4 %
ERYTHROCYTE [DISTWIDTH] IN BLOOD BY AUTOMATED COUNT: 20.4 %
ERYTHROCYTE [DISTWIDTH] IN BLOOD BY AUTOMATED COUNT: 21.1 %
EST. GFR  (AFRICAN AMERICAN): >60 ML/MIN/1.73 M^2
EST. GFR  (NON AFRICAN AMERICAN): >60 ML/MIN/1.73 M^2
FERRITIN SERPL-MCNC: 48 NG/ML
FIO2: 40
FOLATE SERPL-MCNC: 11 NG/ML
GLUCOSE SERPL-MCNC: 128 MG/DL
HCO3 UR-SCNC: 27.8 MMOL/L (ref 24–28)
HCT VFR BLD AUTO: 23.8 %
HCT VFR BLD AUTO: 25.8 %
HGB BLD-MCNC: 7 G/DL
HGB BLD-MCNC: 7.4 G/DL
IMM GRANULOCYTES # BLD AUTO: 0.02 K/UL
IMM GRANULOCYTES # BLD AUTO: 0.04 K/UL
IMM GRANULOCYTES NFR BLD AUTO: 0.3 %
IMM GRANULOCYTES NFR BLD AUTO: 0.4 %
INR PPP: 1.1
IRON SERPL-MCNC: 22 UG/DL
LYMPHOCYTES # BLD AUTO: 2.1 K/UL
LYMPHOCYTES # BLD AUTO: 2.6 K/UL
LYMPHOCYTES NFR BLD: 25.9 %
LYMPHOCYTES NFR BLD: 30.2 %
MAGNESIUM SERPL-MCNC: 2 MG/DL
MCH RBC QN AUTO: 23.5 PG
MCH RBC QN AUTO: 23.7 PG
MCHC RBC AUTO-ENTMCNC: 28.7 G/DL
MCHC RBC AUTO-ENTMCNC: 29.4 G/DL
MCV RBC AUTO: 80 FL
MCV RBC AUTO: 83 FL
MIN VOL: 7.5
MODE: ABNORMAL
MONOCYTES # BLD AUTO: 0.7 K/UL
MONOCYTES # BLD AUTO: 1 K/UL
MONOCYTES NFR BLD: 10 %
MONOCYTES NFR BLD: 10.4 %
NEUTROPHILS # BLD AUTO: 3.9 K/UL
NEUTROPHILS # BLD AUTO: 6.2 K/UL
NEUTROPHILS NFR BLD: 55 %
NEUTROPHILS NFR BLD: 62.4 %
NRBC BLD-RTO: 0 /100 WBC
NRBC BLD-RTO: 0 /100 WBC
NUM UNITS TRANS PACKED RBC: NORMAL
PCO2 BLDA: 37.9 MMHG (ref 35–45)
PEEP: 5
PH SMN: 7.47 [PH] (ref 7.35–7.45)
PHOSPHATE SERPL-MCNC: 2.8 MG/DL
PLATELET # BLD AUTO: 313 K/UL
PLATELET # BLD AUTO: 318 K/UL
PMV BLD AUTO: 10 FL
PMV BLD AUTO: 9.6 FL
PO2 BLDA: 101 MMHG (ref 80–100)
POC BE: 4 MMOL/L
POC SATURATED O2: 98 % (ref 95–100)
POC TCO2: 29 MMOL/L (ref 23–27)
POTASSIUM SERPL-SCNC: 4.1 MMOL/L
PROT SERPL-MCNC: 7 G/DL
PROTHROMBIN TIME: 11.3 SEC
PS: 10
RBC # BLD AUTO: 2.98 M/UL
RBC # BLD AUTO: 3.12 M/UL
SAMPLE: ABNORMAL
SATURATED IRON: 8 %
SITE: ABNORMAL
SODIUM SERPL-SCNC: 145 MMOL/L
SP02: 100
SPONT RATE: 16
TOTAL IRON BINDING CAPACITY: 284 UG/DL
TRANSFERRIN SERPL-MCNC: 192 MG/DL
TRANSFERRIN SERPL-MCNC: 192 MG/DL
VIT B12 SERPL-MCNC: 444 PG/ML
WBC # BLD AUTO: 7.02 K/UL
WBC # BLD AUTO: 9.87 K/UL

## 2018-11-28 PROCEDURE — 85610 PROTHROMBIN TIME: CPT

## 2018-11-28 PROCEDURE — 80053 COMPREHEN METABOLIC PANEL: CPT

## 2018-11-28 PROCEDURE — 94003 VENT MGMT INPAT SUBQ DAY: CPT

## 2018-11-28 PROCEDURE — A4216 STERILE WATER/SALINE, 10 ML: HCPCS | Performed by: NURSE PRACTITIONER

## 2018-11-28 PROCEDURE — 99900035 HC TECH TIME PER 15 MIN (STAT)

## 2018-11-28 PROCEDURE — 82607 VITAMIN B-12: CPT

## 2018-11-28 PROCEDURE — 63600175 PHARM REV CODE 636 W HCPCS: Performed by: NURSE PRACTITIONER

## 2018-11-28 PROCEDURE — 85025 COMPLETE CBC W/AUTO DIFF WBC: CPT | Mod: 91

## 2018-11-28 PROCEDURE — 82728 ASSAY OF FERRITIN: CPT

## 2018-11-28 PROCEDURE — 37799 UNLISTED PX VASCULAR SURGERY: CPT

## 2018-11-28 PROCEDURE — 86850 RBC ANTIBODY SCREEN: CPT

## 2018-11-28 PROCEDURE — 82800 BLOOD PH: CPT

## 2018-11-28 PROCEDURE — 63600175 PHARM REV CODE 636 W HCPCS: Performed by: STUDENT IN AN ORGANIZED HEALTH CARE EDUCATION/TRAINING PROGRAM

## 2018-11-28 PROCEDURE — 25000003 PHARM REV CODE 250: Performed by: PHYSICIAN ASSISTANT

## 2018-11-28 PROCEDURE — P9016 RBC LEUKOCYTES REDUCED: HCPCS

## 2018-11-28 PROCEDURE — 84100 ASSAY OF PHOSPHORUS: CPT

## 2018-11-28 PROCEDURE — 25000003 PHARM REV CODE 250: Performed by: PSYCHIATRY & NEUROLOGY

## 2018-11-28 PROCEDURE — 63600175 PHARM REV CODE 636 W HCPCS: Performed by: PSYCHIATRY & NEUROLOGY

## 2018-11-28 PROCEDURE — 82803 BLOOD GASES ANY COMBINATION: CPT

## 2018-11-28 PROCEDURE — C9113 INJ PANTOPRAZOLE SODIUM, VIA: HCPCS | Performed by: NURSE PRACTITIONER

## 2018-11-28 PROCEDURE — 25000003 PHARM REV CODE 250: Performed by: NURSE PRACTITIONER

## 2018-11-28 PROCEDURE — 94761 N-INVAS EAR/PLS OXIMETRY MLT: CPT

## 2018-11-28 PROCEDURE — 99900026 HC AIRWAY MAINTENANCE (STAT)

## 2018-11-28 PROCEDURE — 27200966 HC CLOSED SUCTION SYSTEM

## 2018-11-28 PROCEDURE — 83540 ASSAY OF IRON: CPT

## 2018-11-28 PROCEDURE — 36430 TRANSFUSION BLD/BLD COMPNT: CPT

## 2018-11-28 PROCEDURE — 99291 CRITICAL CARE FIRST HOUR: CPT | Mod: GC,,, | Performed by: PSYCHIATRY & NEUROLOGY

## 2018-11-28 PROCEDURE — 36415 COLL VENOUS BLD VENIPUNCTURE: CPT

## 2018-11-28 PROCEDURE — 82746 ASSAY OF FOLIC ACID SERUM: CPT

## 2018-11-28 PROCEDURE — 83735 ASSAY OF MAGNESIUM: CPT

## 2018-11-28 PROCEDURE — 86920 COMPATIBILITY TEST SPIN: CPT

## 2018-11-28 PROCEDURE — 20000000 HC ICU ROOM

## 2018-11-28 PROCEDURE — 27000221 HC OXYGEN, UP TO 24 HOURS

## 2018-11-28 RX ORDER — DEXMEDETOMIDINE HYDROCHLORIDE 4 UG/ML
0.2 INJECTION, SOLUTION INTRAVENOUS CONTINUOUS
Status: DISCONTINUED | OUTPATIENT
Start: 2018-11-28 | End: 2018-11-28

## 2018-11-28 RX ORDER — PANTOPRAZOLE SODIUM 40 MG/10ML
40 INJECTION, POWDER, LYOPHILIZED, FOR SOLUTION INTRAVENOUS 2 TIMES DAILY
Status: DISCONTINUED | OUTPATIENT
Start: 2018-11-28 | End: 2018-11-29

## 2018-11-28 RX ORDER — MICONAZOLE NITRATE 2 %
POWDER (GRAM) TOPICAL 2 TIMES DAILY
Status: DISCONTINUED | OUTPATIENT
Start: 2018-11-28 | End: 2018-12-03

## 2018-11-28 RX ORDER — DEXMEDETOMIDINE HYDROCHLORIDE 4 UG/ML
0.2 INJECTION, SOLUTION INTRAVENOUS CONTINUOUS
Status: DISCONTINUED | OUTPATIENT
Start: 2018-11-28 | End: 2018-12-01

## 2018-11-28 RX ORDER — QUETIAPINE FUMARATE 25 MG/1
50 TABLET, FILM COATED ORAL DAILY
Status: DISCONTINUED | OUTPATIENT
Start: 2018-11-28 | End: 2018-12-01

## 2018-11-28 RX ORDER — LEVETIRACETAM 750 MG/1
750 TABLET ORAL 2 TIMES DAILY
Status: DISCONTINUED | OUTPATIENT
Start: 2018-11-28 | End: 2018-12-02

## 2018-11-28 RX ORDER — HYDROCODONE BITARTRATE AND ACETAMINOPHEN 500; 5 MG/1; MG/1
TABLET ORAL
Status: DISCONTINUED | OUTPATIENT
Start: 2018-11-28 | End: 2018-11-29

## 2018-11-28 RX ADMIN — PSYLLIUM HUSK 1 PACKET: 3.4 POWDER ORAL at 09:11

## 2018-11-28 RX ADMIN — FAMOTIDINE 20 MG: 20 TABLET ORAL at 09:11

## 2018-11-28 RX ADMIN — Medication 3 ML: at 02:11

## 2018-11-28 RX ADMIN — LEVETIRACETAM 1000 MG: 500 TABLET ORAL at 09:11

## 2018-11-28 RX ADMIN — LEVETIRACETAM 750 MG: 750 TABLET ORAL at 08:11

## 2018-11-28 RX ADMIN — DEXMEDETOMIDINE HYDROCHLORIDE 0.2 MCG/KG/HR: 100 INJECTION, SOLUTION, CONCENTRATE INTRAVENOUS at 04:11

## 2018-11-28 RX ADMIN — CHLORHEXIDINE GLUCONATE 0.12% ORAL RINSE 15 ML: 1.2 LIQUID ORAL at 09:11

## 2018-11-28 RX ADMIN — Medication 3 ML: at 10:11

## 2018-11-28 RX ADMIN — CHLORHEXIDINE GLUCONATE 0.12% ORAL RINSE 15 ML: 1.2 LIQUID ORAL at 08:11

## 2018-11-28 RX ADMIN — MICONAZOLE NITRATE: 20 POWDER TOPICAL at 02:11

## 2018-11-28 RX ADMIN — MICONAZOLE NITRATE: 20 POWDER TOPICAL at 08:11

## 2018-11-28 RX ADMIN — PANTOPRAZOLE SODIUM 40 MG: 40 INJECTION, POWDER, FOR SOLUTION INTRAVENOUS at 08:11

## 2018-11-28 RX ADMIN — QUETIAPINE FUMARATE 50 MG: 25 TABLET ORAL at 03:11

## 2018-11-28 RX ADMIN — QUETIAPINE FUMARATE 50 MG: 25 TABLET ORAL at 09:11

## 2018-11-28 RX ADMIN — LEVOTHYROXINE SODIUM 100 MCG: 100 TABLET ORAL at 06:11

## 2018-11-28 RX ADMIN — LACOSAMIDE 100 MG: 10 SOLUTION ORAL at 09:11

## 2018-11-28 RX ADMIN — HEPARIN SODIUM 5000 UNITS: 5000 INJECTION, SOLUTION INTRAVENOUS; SUBCUTANEOUS at 06:11

## 2018-11-28 RX ADMIN — HEPARIN SODIUM 5000 UNITS: 5000 INJECTION, SOLUTION INTRAVENOUS; SUBCUTANEOUS at 02:11

## 2018-11-28 RX ADMIN — METOPROLOL TARTRATE 25 MG: 25 TABLET ORAL at 09:11

## 2018-11-28 RX ADMIN — Medication 3 ML: at 06:11

## 2018-11-28 NOTE — NURSING
Pt ETT secretions are bright red. CRISTY Beltran notified. Verbal order to redraw CBC 1 hour after 1 U of pRBCs is transfused. pRBC released now.

## 2018-11-28 NOTE — ASSESSMENT & PLAN NOTE
wean off precedex  Appears to get very agitated around 6-7 pm  Quetiapine at 1600 daily  Continue to monitor

## 2018-11-28 NOTE — ASSESSMENT & PLAN NOTE
Vent Mode: Spont  Oxygen Concentration (%):  [] 40  Resp Rate Total:  [13 br/min-30 br/min] 30 br/min  PEEP/CPAP:  [5 cmH20] 5 cmH20  Pressure Support:  [10 cmH20] 10 cmH20  Mean Airway Pressure:  [7.8 cmH20-9.5 cmH20] 9.5 cmH20  Abg, cxr  Continue vent weaning  Will obtain parameters again today and wean to extubate when able  No surgery planned in near future

## 2018-11-28 NOTE — ASSESSMENT & PLAN NOTE
MRI c-spine without cord compression, contusion or hematoma  CT c-spine with minimally displaced fractures of the posterior arch of the C1 vertebral body in keeping with type 1 Sidney fracture and Type 2 odontoid fracture  C-collar in place  Per NSGY no plans to surgically intervene at the moment  Will work towards extubation

## 2018-11-28 NOTE — PROGRESS NOTES
NCC notified, pt to be extubated tomorrow AM. Ordered to stop TF at 5 am on 11/28 in anticipation of extubation per CRISTY Jamison. Will continue to monitor.

## 2018-11-28 NOTE — NURSING
"Type and screen sent off at 1017. Type and screen has not yet resulted. RN spoke to Katherine at the blood bank. Per Katherine, "it will be another hour before the type and screen is resulted." NCC notified.   "

## 2018-11-28 NOTE — ASSESSMENT & PLAN NOTE
Several episodes of bradycardia this morning with associated hypotension  Will wean off precedex, max 0.4  Will stop metoprolol  monitor closely

## 2018-11-28 NOTE — PLAN OF CARE
Problem: Patient Care Overview  Goal: Plan of Care Review  Outcome: Ongoing (interventions implemented as appropriate)  POC reviewed with pt at 0500. Pt intubated and unable to verbalize understanding at this time. No acute events overnight. Pt progressing toward goals. Will continue to monitor. See flowsheets for full assessment and VS info

## 2018-11-28 NOTE — PLAN OF CARE
Wean vent, wean precedex  Discharge plan: to be determined when medically ready. Patient is resident at [facility].       11/28/18 1257   Discharge Reassessment   Assessment Type Discharge Planning Reassessment   Provided patient/caregiver education on the expected discharge date and the discharge plan No   Do you have any problems affording any of your prescribed medications? No   Discharge Plan A Skilled Nursing Facility   Discharge Plan B Long-term acute care facility (LTAC)   Patient choice form signed by patient/caregiver No   Anticipated Discharge Disposition (TBD)   Can the patient answer the patient profile reliably? No, cognitively impaired  (intubated)   How does the patient rate their overall health at the present time? (mitchel)   Describe the patient's ability to walk at the present time. Does not walk or unable to take any steps at all   How often would a person be available to care for the patient? Often   Number of comorbid conditions (as recorded on the chart) Three   During the past month, has the patient often been bothered by feeling down, depressed or hopeless? (mitchel)   During the past month, has the patient often been bothered by little interest or pleasure in doing things? (mitchel)   Post-Acute Status   Post-Acute Authorization (to be determined when medically ready)     Elvi De La Vega RN/BSN/JOSE ANTONIO  [phone]  [employer]

## 2018-11-28 NOTE — PLAN OF CARE
Problem: Patient Care Overview  Goal: Plan of Care Review  Outcome: Ongoing (interventions implemented as appropriate)  POC reviewed with pt and family at 1400. Pt unable to verbalized understanding due to intubation. Questions and concerns addressed. No acute events today. Pt progressing toward goals. Will continue to monitor. See flowsheets for full assessment and VS info.

## 2018-11-28 NOTE — NURSING
Pt , SPB 200s, and O2 60s. O2 placed on 100%. Pt very agitated and trying to climb out of bed. RN, RT, and charge RN at bedside. CRISTY Beltran notified. Orders placed for Precedex gtt max 0.8 mcg/kg/hr. Precedex gtt titrated per order. 1640 dose of Quetiapine 50 mg also given. CRISTY Beltran and MD Goldie at bedside. VSS.

## 2018-11-28 NOTE — PROGRESS NOTES
" [facility]  Adult Nutrition  Progress Note    SUMMARY       Recommendations    Recommendation/Intervention:   1. If unable to extubate or advance diet s/p extubation, recommend restarting TF.   Glucerna 1.5 @ goal rate 45mL/hr.   - Provides 1620kcals, 89g protein and 820mL free water.   - Hold for residuals >500mL.     2. If able to extubate and advance diet, recommend Diabetic with texture per SLP recommendations.     RD to monitor.    Goals: Pt to receive nutrition by RD follow up  Nutrition Goal Status: goal met  Communication of RD Recs: discussed on rounds    Reason for Assessment    Reason for Assessment: RD follow-up  Diagnosis: seizures  Relevant Medical History: HTN, seizures, DM  Interdisciplinary Rounds: attended  General Information Comments: Pt remains intubated. TF held for possible extubation. Pt continues to appear nourished, no physical signs of malnutrition at this time. No family at bedside.  Nutrition Discharge Planning: unable to determine at this time.    Nutrition Risk Screen    Nutrition Risk Screen: tube feeding or parenteral nutrition    Nutrition/Diet History    Do you have any cultural, spiritual, Yazdanism conflicts, given your current situation?: none  Factors Affecting Nutritional Intake: NPO, on mechanical ventilation    Anthropometrics    Temp: 98.7 °F (37.1 °C)  Height Method: Estimated  Height: 5' 3" (160 cm)  Height (inches): 63 in  Weight Method: (copied from 11/22/18 09:33 entry)  Weight: 84.4 kg (186 lb)  Weight (lb): 186 lb  Ideal Body Weight (IBW), Female: 115 lb  % Ideal Body Weight, Female (lb): 161.74 lb  BMI (Calculated): 33  BMI Grade: 30 - 34.9- obesity - grade I       Lab/Procedures/Meds    Pertinent Labs Reviewed: reviewed  Pertinent Labs Comments: noted  Pertinent Medications Reviewed: reviewed  Pertinent Medications Comments: heparin, precedex, psyllium husk    Physical Findings/Assessment    Overall Physical Appearance: nourished, obese, on " ventilator support  Tubes: orogastric tube  Skin: edema, intact    Estimated/Assessed Needs    Weight Used For Calorie Calculations: 84.4 kg (186 lb 1.1 oz)  Energy Calorie Requirements (kcal): 1568  Energy Need Method: [place] (modified)  Protein Requirements: 79-105g(1.5-2.0g/kg)  Weight Used For Protein Calculations: 52.3 kg (115 lb 4.8 oz)  Fluid Requirements (mL): 1mL/kcal or per MD     RDA Method (mL): 1568  CHO Requirement: 50% of total kcals      Nutrition Prescription Ordered    Current Diet Order: NPO  Nutrition Order Comments: TF held  Current Nutrition Support Formula Ordered: Glucerna 1.5  Current Nutrition Support Rate Ordered: 45 (ml)  Current Nutrition Support Frequency Ordered: mL/hr    Evaluation of Received Nutrient/Fluid Intake    Enteral Calories (kcal): 1620  Enteral Protein (gm): 89  Enteral (Free Water) Fluid (mL): 820    % Kcal Needs: 103  % Protein Needs: 100    I/O: +I/O, good UOP, LBM 11/27    Energy Calories Required: meeting needs  Protein Required: meeting needs  Fluid Required: other (see comments)(per MD)    % Intake of Estimated Energy Needs: 75 - 100 %  % Meal Intake: NPO    Nutrition Risk    Level of Risk/Frequency of Follow-up: (f/u 2x/week)     Assessment and Plan    Nutrition Problem  Inadequate energy intake     Related to (etiology):   NPO     Signs and Symptoms (as evidenced by):   Pt receiving <85% EEN and EPN.      Intervention:  Initiate enteral nutrition or carbohydrate consistent diet pending medical course     Nutrition Diagnosis Status:   Improving           Monitor and Evaluation    Food and Nutrient Intake: energy intake, food and beverage intake, enteral nutrition intake  Food and Nutrient Adminstration: diet order, enteral and parenteral nutrition administration  Anthropometric Measurements: weight, weight change, body mass index  Biochemical Data, Medical Tests and Procedures: electrolyte and renal panel, gastrointestinal profile, glucose/endocrine profile,  inflammatory profile, lipid profile  Nutrition-Focused Physical Findings: overall appearance     Nutrition Follow-Up    RD Follow-up?: Yes

## 2018-11-28 NOTE — PROGRESS NOTES
[facility]  Neurocritical Care  Progress Note    Admit Date: 11/22/2018  Service Date: 11/28/2018  Length of Stay: 6    Subjective:     Chief Complaint: Cervical spine fracture    History of Present Illness: 73 y F transferred from [place] for cervical spine injury and seizure activity. She has pmh of HTN, DM, seizure disorder and had an unwitnessed seizure (presumed) at nursing home today followed by fall. On arrival by EMS she was responding minimally. Did not require CPR. She had one seizure en route to hospital at [place] and received versed. At ER at OSH, patient had another witnessed seizure with tonic clonic activity in extremities and became unresponsive. She was intubated, and given iv fosphenytoin.  CTH was negative. CT c-spine showed odontoid fracture and C1 fracture. She received versed and fosphenytoin and OSH, when she was transferred by helicopter to [facility], en route she was started on ketamine gtt at 0.5 mcg/kg/min. She was transferred to [facility] for cervical fracture requiring neurosurg intervention and also seizure monitoring and control.      TO note  Patient had recent pneumonia and GI bleed earlier this month and was treated at OSH. She was discharged on 11/20 with levaquin for pneumonia and returned to NH     Hospital Course: 11/22: admit to Neuro ICU for higher level of care. Neurosurgery consulted for C-spine fracture. MRI c-spine ordered. Continous EEG monitoring. Epilepsy consulted. Loaded keppra and maintenance keppra  11/26: seroquel, derm consult, discussion with NSGY regarding surgical plan, daughter updated at bedside  11/27: ECG, increase seroquel, Vtach this morning, then bradycardia, wean off precedex, consult nutrition for TF change, Husks x 3 days, obtain smaller MJcollar  11/28: weaning parameters again, stop vimpat, metoprolol, decrease keppra, wean precedex, anemia workup, bradycardia with hypotension event this morning    Review of Symptoms:   Constitutional:  Denies fevers or chills.  ENT: no hearing difficulty, no visual changes  Pulmonary: Denies shortness of breath or cough.  Cardiology: Denies chest pain or palpitations.  GI: Denies abdominal pain or constipation.  Neurologic: Denies new weakness, headaches, or paresthesias.   : no dysuria  Musk: no muscle pain, no joint pain  Psych: no hallucinations     Physical Exam:  GA:  comfortable, no acute distress.   HEENT: No scleral icterus or JVD.   Pulmonary: Clear to auscultation A/L. No wheezing, crackles, or rhonchi.  Cardiac: RRR S1 & S2 w/o rubs/murmurs/gallops.   Abdominal: Bowel sounds present x 4. No appreciable hepatosplenomegaly.  Skin: No jaundice, rashes, or visible lesions. Collar in place  Pulses: 2+ DP bilat     Neuro:  --sedation: precedex  --GCS: [id number]  --Mental Status: awake, follows simple commands, though slightly more sleepy this morning  --CN II-XII grossly intact.   --Pupils 3-->2mm, PERRL.   --brainstem: intact  --Motor: 4/5 throughout  --sensory: intact to soft touch and pain throughout  --Reflexes: not tested  --Gait: deferred    Recent Labs   Lab 11/28/18  0249   WBC 7.02   RBC 2.98*   HGB 7.0*   HCT 23.8*      MCV 80*   MCH 23.5*   MCHC 29.4*     Recent Labs   Lab 11/28/18  0249   CALCIUM 8.1*   PROT 7.0      K 4.1   CO2 25   *   BUN 15   CREATININE 0.7   ALKPHOS 70   ALT 12   AST 30   BILITOT 0.4     No results for input(s): PT, INR, APTT in the last 24 hours.  Vent Mode: Spont  Oxygen Concentration (%):  [] 40  Resp Rate Total:  [13 br/min-30 br/min] 30 br/min  PEEP/CPAP:  [5 cmH20] 5 cmH20  Pressure Support:  [10 cmH20] 10 cmH20  Mean Airway Pressure:  [7.8 cmH20-9.5 cmH20] 9.5 cmH20    Temp: 98.7 °F (37.1 °C)  Pulse: 84  Rhythm: normal sinus rhythm  BP: (!) 140/69  MAP (mmHg): 94  Resp: 17  SpO2: 100 %  Oxygen Concentration (%): 40  O2 Device (Oxygen Therapy): ventilator  Vent Mode: Spont  Pressure Support: 10 cmH20  PEEP/CPAP: 5 cmH20  Peak Airway Pressure: 16  cmH2O  Mean Airway Pressure: 9.5 cmH20  Plateau Pressure: 0 cmH20    Temp  Min: 98.3 °F (36.8 °C)  Max: 99.3 °F (37.4 °C)  Pulse  Min: 57  Max: 100  BP  Min: 109/59  Max: 204/84  MAP (mmHg)  Min: 77  Max: 120  Resp  Min: 1  Max: 30  SpO2  Min: 98 %  Max: 100 %  Oxygen Concentration (%)  Min: 40  Max: 100    11/27 0701 - 11/28 0700  In: 1641.6 [I.V.:371.6]  Out: 950 [Urine:600]   Unmeasured Output  Urine Occurrence: 1(purewick failed)  Stool Occurrence: 1  Pad Count: 1    Nutrition Prescription Ordered  Current Diet Order: NPO  Nutrition Order Comments: TF held  Current Nutrition Support Formula Ordered: Glucerna 1.5  Current Nutrition Support Rate Ordered: 45 (ml)  Current Nutrition Support Frequency Ordered: mL/hr  Last Bowel Movement: 11/28/18    Body mass index is 32.95 kg/m².      I have personally reviewed all labs, imaging, and studies today    Assessment/Plan:     Neuro   * Cervical spine fracture    MRI c-spine without cord compression, contusion or hematoma  CT c-spine with minimally displaced fractures of the posterior arch of the C1 vertebral body in keeping with type 1 Sidney fracture and Type 2 odontoid fracture  C-collar in place  Per NSGY no plans to surgically intervene at the moment  Will work towards extubation       [place] coma scale total score 9-12    Multifactorial  Continue to monitor  More sleepy today  Received quetiapine this morning, was supposed to be at 1600  Will wean precedex, max 0.4     Seizures    Irritable on EEG  In light of mental status and cardiac events recently will wean AEDs  Stop lacosamide due to OR interval prolongation potential and Vtach/bradycardia episodes  Decrease levetiracetam dose  Monitor for signs of seizure     Psychiatric   Restlessness and agitation    wean off precedex  Appears to get very agitated around 6-7 pm  Quetiapine at 1600 daily  Continue to monitor     Cardiac/Vascular   Bradycardia    Several episodes of bradycardia this morning with  associated hypotension  Will wean off precedex, max 0.4  Will stop metoprolol  monitor closely     V-tach    No further events  Monitor sanjiv     Renal/   Hypophosphatemia    Replete daily prn     Other   Endotracheally intubated    Vent Mode: Spont  Oxygen Concentration (%):  [] 40  Resp Rate Total:  [13 br/min-30 br/min] 30 br/min  PEEP/CPAP:  [5 cmH20] 5 cmH20  Pressure Support:  [10 cmH20] 10 cmH20  Mean Airway Pressure:  [7.8 cmH20-9.5 cmH20] 9.5 cmH20  Abg, cxr  Continue vent weaning  Will obtain parameters again today and wean to extubate when able  No surgery planned in near future           The patient is being Prophylaxed for:  Venous Thromboembolism with: Chemical  Stress Ulcer with: H2B  Ventilator Pneumonia with: chlorhexidine oral care    Activity Orders          None        Full Code    Gagandeep Amezcua MD  Neurocritical Care  [facility] time 32 minutes  Critical Care was time spent personally by me on the following activities: development of treatment plan with patient or surrogate and bedside caregivers, discussions with consultants, evaluation of patient's response to treatment, examination of patient, ordering and performing treatments and interventions, ordering and review of laboratory studies, ordering and review of radiographic studies, pulse oximetry, re-evaluation of patient's condition. This critical care time did not overlap with that of any other provider or involve time for any procedures.

## 2018-11-28 NOTE — PROGRESS NOTES
NCC notified, H&H= 7.0/23.8, down from 7.2/25.5. Per CRISTY Jamison, no new orders at this time. Will continue to monitor closely.

## 2018-11-28 NOTE — ASSESSMENT & PLAN NOTE
Multifactorial  Continue to monitor  More sleepy today  Received quetiapine this morning, was supposed to be at 1600  Will wean precedex, max 0.4

## 2018-11-28 NOTE — ASSESSMENT & PLAN NOTE
Irritable on EEG  In light of mental status and cardiac events recently will wean AEDs  Stop lacosamide due to NV interval prolongation potential and Vtach/bradycardia episodes  Decrease levetiracetam dose  Monitor for signs of seizure

## 2018-11-29 PROBLEM — D63.8 ANEMIA OF CHRONIC DISEASE: Status: ACTIVE | Noted: 2018-11-29

## 2018-11-29 LAB
ALBUMIN SERPL BCP-MCNC: 2.4 G/DL
ALLENS TEST: ABNORMAL
ALP SERPL-CCNC: 62 U/L
ALT SERPL W/O P-5'-P-CCNC: 15 U/L
ANION GAP SERPL CALC-SCNC: 7 MMOL/L
AST SERPL-CCNC: 35 U/L
BASOPHILS # BLD AUTO: 0.08 K/UL
BASOPHILS NFR BLD: 0.9 %
BILIRUB SERPL-MCNC: 0.7 MG/DL
BUN SERPL-MCNC: 16 MG/DL
CALCIUM SERPL-MCNC: 8.3 MG/DL
CHLORIDE SERPL-SCNC: 110 MMOL/L
CO2 SERPL-SCNC: 26 MMOL/L
CREAT SERPL-MCNC: 0.8 MG/DL
DELSYS: ABNORMAL
DIFFERENTIAL METHOD: ABNORMAL
EOSINOPHIL # BLD AUTO: 0.2 K/UL
EOSINOPHIL NFR BLD: 2.6 %
ERYTHROCYTE [DISTWIDTH] IN BLOOD BY AUTOMATED COUNT: 20.5 %
ERYTHROCYTE [SEDIMENTATION RATE] IN BLOOD BY WESTERGREN METHOD: 15 MM/H
EST. GFR  (AFRICAN AMERICAN): >60 ML/MIN/1.73 M^2
EST. GFR  (NON AFRICAN AMERICAN): >60 ML/MIN/1.73 M^2
FIO2: 40
GLUCOSE SERPL-MCNC: 85 MG/DL
HCO3 UR-SCNC: 28 MMOL/L (ref 24–28)
HCT VFR BLD AUTO: 26.5 %
HGB BLD-MCNC: 7.5 G/DL
IMM GRANULOCYTES # BLD AUTO: 0.02 K/UL
IMM GRANULOCYTES NFR BLD AUTO: 0.2 %
LYMPHOCYTES # BLD AUTO: 2.8 K/UL
LYMPHOCYTES NFR BLD: 33.6 %
MAGNESIUM SERPL-MCNC: 1.9 MG/DL
MCH RBC QN AUTO: 23.6 PG
MCHC RBC AUTO-ENTMCNC: 28.3 G/DL
MCV RBC AUTO: 83 FL
MIN VOL: 9.82
MODE: ABNORMAL
MONOCYTES # BLD AUTO: 0.9 K/UL
MONOCYTES NFR BLD: 10.5 %
NEUTROPHILS # BLD AUTO: 4.4 K/UL
NEUTROPHILS NFR BLD: 52.2 %
NRBC BLD-RTO: 0 /100 WBC
PCO2 BLDA: 39.9 MMHG (ref 35–45)
PEEP: 5
PH SMN: 7.46 [PH] (ref 7.35–7.45)
PHOSPHATE SERPL-MCNC: 2.9 MG/DL
PIP: 30
PLATELET # BLD AUTO: 337 K/UL
PMV BLD AUTO: 10 FL
PO2 BLDA: 80 MMHG (ref 80–100)
POC BE: 4 MMOL/L
POC SATURATED O2: 96 % (ref 95–100)
POC TCO2: 29 MMOL/L (ref 23–27)
POTASSIUM SERPL-SCNC: 3.8 MMOL/L
PROT SERPL-MCNC: 7.2 G/DL
RBC # BLD AUTO: 3.18 M/UL
SAMPLE: ABNORMAL
SITE: ABNORMAL
SODIUM SERPL-SCNC: 143 MMOL/L
SP02: 100
VT: 500
WBC # BLD AUTO: 8.44 K/UL

## 2018-11-29 PROCEDURE — 84100 ASSAY OF PHOSPHORUS: CPT

## 2018-11-29 PROCEDURE — 25000003 PHARM REV CODE 250: Performed by: NURSE PRACTITIONER

## 2018-11-29 PROCEDURE — 83735 ASSAY OF MAGNESIUM: CPT

## 2018-11-29 PROCEDURE — 63600175 PHARM REV CODE 636 W HCPCS

## 2018-11-29 PROCEDURE — 63600175 PHARM REV CODE 636 W HCPCS: Performed by: STUDENT IN AN ORGANIZED HEALTH CARE EDUCATION/TRAINING PROGRAM

## 2018-11-29 PROCEDURE — 94150 VITAL CAPACITY TEST: CPT

## 2018-11-29 PROCEDURE — 63600175 PHARM REV CODE 636 W HCPCS: Performed by: NURSE PRACTITIONER

## 2018-11-29 PROCEDURE — 82803 BLOOD GASES ANY COMBINATION: CPT

## 2018-11-29 PROCEDURE — 25000003 PHARM REV CODE 250: Performed by: PSYCHIATRY & NEUROLOGY

## 2018-11-29 PROCEDURE — 27200966 HC CLOSED SUCTION SYSTEM

## 2018-11-29 PROCEDURE — 36600 WITHDRAWAL OF ARTERIAL BLOOD: CPT

## 2018-11-29 PROCEDURE — A4216 STERILE WATER/SALINE, 10 ML: HCPCS | Performed by: NURSE PRACTITIONER

## 2018-11-29 PROCEDURE — 85025 COMPLETE CBC W/AUTO DIFF WBC: CPT

## 2018-11-29 PROCEDURE — 27000221 HC OXYGEN, UP TO 24 HOURS

## 2018-11-29 PROCEDURE — 80053 COMPREHEN METABOLIC PANEL: CPT

## 2018-11-29 PROCEDURE — 94761 N-INVAS EAR/PLS OXIMETRY MLT: CPT

## 2018-11-29 PROCEDURE — 99291 CRITICAL CARE FIRST HOUR: CPT | Mod: GC,,, | Performed by: PSYCHIATRY & NEUROLOGY

## 2018-11-29 PROCEDURE — 20000000 HC ICU ROOM

## 2018-11-29 PROCEDURE — 94003 VENT MGMT INPAT SUBQ DAY: CPT

## 2018-11-29 PROCEDURE — 99232 SBSQ HOSP IP/OBS MODERATE 35: CPT | Mod: GC,,, | Performed by: NEUROLOGICAL SURGERY

## 2018-11-29 PROCEDURE — 94010 BREATHING CAPACITY TEST: CPT

## 2018-11-29 PROCEDURE — 99900035 HC TECH TIME PER 15 MIN (STAT)

## 2018-11-29 PROCEDURE — 99900026 HC AIRWAY MAINTENANCE (STAT)

## 2018-11-29 PROCEDURE — C9113 INJ PANTOPRAZOLE SODIUM, VIA: HCPCS | Performed by: NURSE PRACTITIONER

## 2018-11-29 RX ORDER — FAMOTIDINE 20 MG/1
20 TABLET, FILM COATED ORAL DAILY
Status: DISCONTINUED | OUTPATIENT
Start: 2018-11-29 | End: 2018-11-30

## 2018-11-29 RX ORDER — DEXAMETHASONE SODIUM PHOSPHATE 4 MG/ML
10 INJECTION, SOLUTION INTRA-ARTICULAR; INTRALESIONAL; INTRAMUSCULAR; INTRAVENOUS; SOFT TISSUE ONCE
Status: COMPLETED | OUTPATIENT
Start: 2018-11-29 | End: 2018-11-29

## 2018-11-29 RX ORDER — FENTANYL CITRATE 50 UG/ML
INJECTION, SOLUTION INTRAMUSCULAR; INTRAVENOUS
Status: COMPLETED
Start: 2018-11-29 | End: 2018-11-29

## 2018-11-29 RX ORDER — DEXAMETHASONE SODIUM PHOSPHATE 4 MG/ML
6 INJECTION, SOLUTION INTRA-ARTICULAR; INTRALESIONAL; INTRAMUSCULAR; INTRAVENOUS; SOFT TISSUE EVERY 8 HOURS
Status: COMPLETED | OUTPATIENT
Start: 2018-11-29 | End: 2018-12-01

## 2018-11-29 RX ORDER — LORAZEPAM 2 MG/ML
INJECTION INTRAMUSCULAR
Status: DISCONTINUED
Start: 2018-11-29 | End: 2018-11-29 | Stop reason: WASHOUT

## 2018-11-29 RX ORDER — MIDAZOLAM HYDROCHLORIDE 1 MG/ML
INJECTION INTRAMUSCULAR; INTRAVENOUS
Status: COMPLETED
Start: 2018-11-29 | End: 2018-11-29

## 2018-11-29 RX ORDER — FENTANYL CITRATE 50 UG/ML
50 INJECTION, SOLUTION INTRAMUSCULAR; INTRAVENOUS ONCE
Status: COMPLETED | OUTPATIENT
Start: 2018-11-29 | End: 2018-11-29

## 2018-11-29 RX ORDER — MIDAZOLAM HYDROCHLORIDE 1 MG/ML
2 INJECTION INTRAMUSCULAR; INTRAVENOUS ONCE
Status: COMPLETED | OUTPATIENT
Start: 2018-11-29 | End: 2018-11-29

## 2018-11-29 RX ADMIN — MIDAZOLAM HYDROCHLORIDE 2 MG: 1 INJECTION INTRAMUSCULAR; INTRAVENOUS at 02:11

## 2018-11-29 RX ADMIN — DEXAMETHASONE SODIUM PHOSPHATE 10 MG: 4 INJECTION, SOLUTION INTRAMUSCULAR; INTRAVENOUS at 11:11

## 2018-11-29 RX ADMIN — LEVETIRACETAM 750 MG: 750 TABLET ORAL at 09:11

## 2018-11-29 RX ADMIN — CHLORHEXIDINE GLUCONATE 0.12% ORAL RINSE 15 ML: 1.2 LIQUID ORAL at 09:11

## 2018-11-29 RX ADMIN — LEVOTHYROXINE SODIUM 100 MCG: 100 TABLET ORAL at 05:11

## 2018-11-29 RX ADMIN — Medication 3 ML: at 02:11

## 2018-11-29 RX ADMIN — POTASSIUM CHLORIDE 40 MEQ: 20 SOLUTION ORAL at 05:11

## 2018-11-29 RX ADMIN — PANTOPRAZOLE SODIUM 40 MG: 40 INJECTION, POWDER, FOR SOLUTION INTRAVENOUS at 09:11

## 2018-11-29 RX ADMIN — Medication 3 ML: at 09:11

## 2018-11-29 RX ADMIN — DEXAMETHASONE SODIUM PHOSPHATE 6 MG: 4 INJECTION, SOLUTION INTRAMUSCULAR; INTRAVENOUS at 03:11

## 2018-11-29 RX ADMIN — MICONAZOLE NITRATE: 20 POWDER TOPICAL at 09:11

## 2018-11-29 RX ADMIN — PSYLLIUM HUSK 1 PACKET: 3.4 POWDER ORAL at 09:11

## 2018-11-29 RX ADMIN — DEXAMETHASONE SODIUM PHOSPHATE 6 MG: 4 INJECTION, SOLUTION INTRAMUSCULAR; INTRAVENOUS at 09:11

## 2018-11-29 RX ADMIN — MIDAZOLAM HYDROCHLORIDE 2 MG: 1 INJECTION, SOLUTION INTRAMUSCULAR; INTRAVENOUS at 02:11

## 2018-11-29 RX ADMIN — FENTANYL CITRATE 50 MCG: 50 INJECTION, SOLUTION INTRAMUSCULAR; INTRAVENOUS at 03:11

## 2018-11-29 RX ADMIN — Medication 3 ML: at 05:11

## 2018-11-29 RX ADMIN — DEXMEDETOMIDINE HYDROCHLORIDE 0.2 MCG/KG/HR: 100 INJECTION, SOLUTION, CONCENTRATE INTRAVENOUS at 12:11

## 2018-11-29 RX ADMIN — QUETIAPINE FUMARATE 50 MG: 25 TABLET ORAL at 04:11

## 2018-11-29 RX ADMIN — FENTANYL CITRATE 50 MCG: 50 INJECTION INTRAMUSCULAR; INTRAVENOUS at 03:11

## 2018-11-29 NOTE — PROGRESS NOTES
Dr. Amezcua at bedside to reassess patient for possible extubation. Patient still appears uncomfortable with tachypnea, tachycardia, and SBP 180s-190s. MD verbally ordered to administer Fentanyl 50mcg IVP x1 dose and reassess patient. Order placed. Medication administered. Will continue to monitor.

## 2018-11-29 NOTE — PROGRESS NOTES
[facility]  Neurocritical Care  Progress Note    Admit Date: 11/22/2018  Service Date: 11/29/2018  Length of Stay: 7    Subjective:     Chief Complaint: Cervical spine fracture    History of Present Illness: 73 y F transferred from [place] for cervical spine injury and seizure activity. She has pmh of HTN, DM, seizure disorder and had an unwitnessed seizure (presumed) at nursing home today followed by fall. On arrival by EMS she was responding minimally. Did not require CPR. She had one seizure en route to hospital at [place] and received versed. At ER at OSH, patient had another witnessed seizure with tonic clonic activity in extremities and became unresponsive. She was intubated, and given iv fosphenytoin.  CTH was negative. CT c-spine showed odontoid fracture and C1 fracture. She received versed and fosphenytoin and OSH, when she was transferred by helicopter to [facility], en route she was started on ketamine gtt at 0.5 mcg/kg/min. She was transferred to [facility] for cervical fracture requiring neurosurg intervention and also seizure monitoring and control.      TO note  Patient had recent pneumonia and GI bleed earlier this month and was treated at OSH. She was discharged on 11/20 with levaquin for pneumonia and returned to NH     Hospital Course: 11/22: admit to Neuro ICU for higher level of care. Neurosurgery consulted for C-spine fracture. MRI c-spine ordered. Continous EEG monitoring. Epilepsy consulted. Loaded keppra and maintenance keppra  11/26: seroquel, derm consult, discussion with NSGY regarding surgical plan, daughter updated at bedside  11/27: ECG, increase seroquel, Vtach this morning, then bradycardia, wean off precedex, consult nutrition for TF change, Husks x 3 days, obtain smaller MJcollar  11/28: weaning parameters again, stop vimpat, metoprolol, decrease keppra, wean precedex, anemia workup, bradycardia with hypotension event this morning  11/29: no cuff leak this morning, start  decadron then re-eval later    Review of Symptoms:   Constitutional: Denies fevers or chills.  ENT: no hearing difficulty, no visual changes  Pulmonary: Denies shortness of breath or cough.  Cardiology: Denies chest pain or palpitations.  GI: Denies abdominal pain or constipation.  Neurologic: Denies new weakness, headaches, or paresthesias.   : no dysuria  Musk: no muscle pain, no joint pain  Psych: no hallucinations     Physical Exam:  GA:  comfortable, no acute distress.   HEENT: No scleral icterus or JVD.   Pulmonary: Clear to auscultation A/L. No wheezing, crackles, or rhonchi.  Cardiac: RRR S1 & S2 w/o rubs/murmurs/gallops.   Abdominal: Bowel sounds present x 4. No appreciable hepatosplenomegaly.  Skin: No jaundice, rashes, or visible lesions. Collar in place  Pulses: 2+ DP bilat     Neuro:  --sedation: precedex  --GCS: [id number]  --Mental Status: awake, follows commands  --CN II-XII grossly intact.   --Pupils 3-->2mm, PERRL.   --brainstem: intact  --Motor: 4/5 throughout  --sensory: intact to soft touch and pain throughout  --Reflexes: not tested  --Gait: deferred    Recent Labs   Lab 11/29/18  0131   WBC 8.44   RBC 3.18*   HGB 7.5*   HCT 26.5*      MCV 83   MCH 23.6*   MCHC 28.3*     Recent Labs   Lab 11/29/18  0131   CALCIUM 8.3*   PROT 7.2      K 3.8   CO2 26      BUN 16   CREATININE 0.8   ALKPHOS 62   ALT 15   AST 35   BILITOT 0.7     Recent Labs   Lab 11/28/18  1557   INR 1.1     Vent Mode: Spont  Oxygen Concentration (%):  [] 40  Resp Rate Total:  [15 br/min-104 br/min] 31 br/min  Vt Set:  [500 mL] 500 mL  PEEP/CPAP:  [5 cmH20] 5 cmH20  Pressure Support:  [5 cmH20-10 cmH20] 5 cmH20  Mean Airway Pressure:  [7.3 epD66-30 cmH20] 7.4 cmH20    Temp: 98.7 °F (37.1 °C)  Pulse: 109  Rhythm: normal sinus rhythm  BP: (!) 160/96  MAP (mmHg): 116  Resp: 19  SpO2: 98 %  Oxygen Concentration (%): 40  O2 Device (Oxygen Therapy): ventilator  Vent Mode: Spont  Set Rate: 15 bmp  Vt Set: 500  mL  Pressure Support: 5 cmH20  PEEP/CPAP: 5 cmH20  Peak Airway Pressure: 11 cmH2O  Mean Airway Pressure: 7.4 cmH20  Plateau Pressure: 0 cmH20    Temp  Min: 98.3 °F (36.8 °C)  Max: 98.9 °F (37.2 °C)  Pulse  Min: 51  Max: 124  BP  Min: 87/49  Max: 177/76  MAP (mmHg)  Min: 66  Max: 120  Resp  Min: 15  Max: 26  SpO2  Min: 92 %  Max: 100 %  Oxygen Concentration (%)  Min: 40  Max: 100    11/28 0701 - 11/29 0700  In: 236.3 [I.V.:126.3]  Out: 50 [Urine:50]   Unmeasured Output  Urine Occurrence: 1  Stool Occurrence: 1  Pad Count: 2    Nutrition Prescription Ordered  Current Diet Order: NPO  Nutrition Order Comments: TF held  Current Nutrition Support Formula Ordered: Glucerna 1.5  Current Nutrition Support Rate Ordered: 45 (ml)  Current Nutrition Support Frequency Ordered: mL/hr  Last Bowel Movement: 11/29/18    Body mass index is 32.95 kg/m².      I have personally reviewed all labs, imaging, and studies today    Assessment/Plan:     Neuro   * Cervical spine fracture    MRI c-spine without cord compression, contusion or hematoma  CT c-spine with minimally displaced fractures of the posterior arch of the C1 vertebral body in keeping with type 1 [place] fracture and Type 2 odontoid fracture  C-collar in place  Per NSGY no plans to surgically intervene at the moment  Will work towards extubation       Seizures    Continue to monitor  Now that off lacosamide and levetiracetam decreased     Psychiatric   Restlessness and agitation    wean off precedex  Appears to get very agitated around 6-7 pm  Quetiapine at 1600 daily  Continue to monitor     Cardiac/Vascular   Bradycardia    One episode this morning associated with increased precedex dosing  Will wean off precedex, max 0.3  monitor closely     V-tach    No further events  Monitor closley     Renal/   Hypophosphatemia    Replete daily prn     Other   Endotracheally intubated    Vent Mode: Spont  Oxygen Concentration (%):  [] 40  Resp Rate Total:  [15 br/min-104  br/min] 31 br/min  Vt Set:  [500 mL] 500 mL  PEEP/CPAP:  [5 cmH20] 5 cmH20  Pressure Support:  [5 cmH20-10 cmH20] 5 cmH20  Mean Airway Pressure:  [7.3 gkB42-51 cmH20] 7.4 cmH20  Abg, cxr  No cuff leak this morning  Start decadron for 48 hours and reassess later today after load dose           The patient is being Prophylaxed for:  Venous Thromboembolism with: Chemical  Stress Ulcer with: H2B  Ventilator Pneumonia with: chlorhexidine oral care    Activity Orders          None        Full Code    Gagandeep Amezcua MD  Neurocritical Care  [facility]    Cc time 36 minutes  Critical Care was time spent personally by me on the following activities: development of treatment plan with patient or surrogate and bedside caregivers, discussions with consultants, evaluation of patient's response to treatment, examination of patient, ordering and performing treatments and interventions, ordering and review of laboratory studies, ordering and review of radiographic studies, pulse oximetry, re-evaluation of patient's condition. This critical care time did not overlap with that of any other provider or involve time for any procedures.

## 2018-11-29 NOTE — ASSESSMENT & PLAN NOTE
One episode this morning associated with increased precedex dosing  Will wean off precedex, max 0.3  monitor closely

## 2018-11-29 NOTE — SUBJECTIVE & OBJECTIVE
Interval History: NAEON    Medications:  Continuous Infusions:   dexmedetomidine (PRECEDEX) infusion 0.3 mcg/kg/hr (11/29/18 1100)     Scheduled Meds:   chlorhexidine  15 mL Mouth/Throat BID    dexamethasone  6 mg Intravenous Q8H    levETIRAcetam  750 mg Per NG tube BID    levothyroxine  100 mcg Per NG tube Before breakfast    miconazole NITRATE 2 %   Topical (Top) BID    QUEtiapine  50 mg Per NG tube Daily    sodium chloride 0.9%  3 mL Intravenous Q8H     PRN Meds:albuterol-ipratropium, magnesium oxide, magnesium oxide, ondansetron, potassium chloride 10%, potassium chloride 10%, potassium chloride 10%, potassium, sodium phosphates, potassium, sodium phosphates, potassium, sodium phosphates, senna-docusate 8.6-50 mg, sodium chloride 0.9%     Review of Systems    Objective:     Weight: 84.4 kg (186 lb)  Body mass index is 32.95 kg/m².  Vital Signs (Most Recent):  Temp: 98.7 °F (37.1 °C) (11/29/18 1100)  Pulse: 102 (11/29/18 1138)  Resp: 19 (11/29/18 0505)  BP: (!) 154/69 (11/29/18 1100)  SpO2: 99 % (11/29/18 1138) Vital Signs (24h Range):  Temp:  [98.3 °F (36.8 °C)-98.9 °F (37.2 °C)] 98.7 °F (37.1 °C)  Pulse:  [] 102  Resp:  [15-26] 19  SpO2:  [92 %-100 %] 99 %  BP: ()/() 154/69  Arterial Line BP: ()/() 111/81     Date 11/29/18 0700 - 11/30/18 0659   Shift 5580-6517 3425-4975 2098-1634 24 Hour Total   INTAKE   I.V.(mL/kg) 31.5(0.4)   31.5(0.4)   Shift Total(mL/kg) 31.5(0.4)   31.5(0.4)   OUTPUT   Shift Total(mL/kg)       Weight (kg) 84.4 84.4 84.4 84.4              Vent Mode: Spont  Oxygen Concentration (%):  [] 40  Resp Rate Total:  [15 br/min-104 br/min] 25 br/min  Vt Set:  [500 mL] 500 mL  PEEP/CPAP:  [5 cmH20] 5 cmH20  Pressure Support:  [5 cmH20-10 cmH20] 5 cmH20  Mean Airway Pressure:  [7.3 zpT47-05 cmH20] 7.3 cmH20         NG/OG Tube 11/22/18 Right mouth (Active)   Placement Check placement verified by x-ray 11/23/2018  7:05 PM   Distal Tube Length (cm) 55  11/23/2018  7:05 PM   Tolerance no signs/symptoms of discomfort 11/23/2018  7:05 PM   Securement taped to commercial device 11/23/2018  7:05 PM   Clamp Status/Tolerance clamped 11/23/2018  7:05 PM   Insertion Site Appearance no redness, warmth, tenderness, skin breakdown, drainage 11/23/2018  3:05 PM   Flush/Irrigation flushed w/;water;no resistance met 11/23/2018  7:05 PM   Intake (mL) - Formula Tube Feeding 70 11/23/2018 11:05 AM       Female External Urinary Catheter 11/22/18 1900 (Active)   Skin no redness;no breakdown 11/23/2018  7:05 PM   Tolerance no signs/symptoms of discomfort 11/23/2018  7:05 PM   Suction Continuous suction at 60 mmHg 11/23/2018  7:05 PM   Date of last wick change 11/23/18 11/23/2018  7:05 PM   Time of last wick change 1905 11/23/2018  7:05 PM   Output (mL) 100 mL 11/23/2018  8:00 PM       Neurosurgery Physical Exam    General: On precedex E3VtM6  CNII-XII: PERRL, EOMi, facial symmetry noted  Extremities: FCx4    Significant Labs:  Recent Labs   Lab 11/28/18  0249 11/29/18  0131   * 85    143   K 4.1 3.8   * 110   CO2 25 26   BUN 15 16   CREATININE 0.7 0.8   CALCIUM 8.1* 8.3*   MG 2.0 1.9     Recent Labs   Lab 11/28/18  0249 11/28/18  1921 11/29/18  0131   WBC 7.02 9.87 8.44   HGB 7.0* 7.4* 7.5*   HCT 23.8* 25.8* 26.5*    313 337     Recent Labs   Lab 11/28/18  1557   INR 1.1     Microbiology Results (last 7 days)     Procedure Component Value Units Date/Time    Blood culture (site 1) [[id number]] Collected:  11/22/18 1712    Order Status:  Completed Specimen:  Blood from Peripheral, Antecubital, Left Updated:  11/28/18 0612     Blood Culture, Routine No growth after 5 days.    Narrative:       Site # 1, aerobic and anaerobic    Blood culture (site 2) [[id number]] Collected:  11/22/18 1724    Order Status:  Completed Specimen:  Blood from Peripheral, Wrist, Right Updated:  11/28/18 0612     Blood Culture, Routine No growth after 5 days.    Narrative:       Site # 2,  aerobic only    Culture, Respiratory with Gram Stain [[id number]] Collected:  11/23/18 1132    Order Status:  Completed Specimen:  Respiratory from Endotracheal Aspirate Updated:  11/26/18 1416     Respiratory Culture --     OKSANA ALBICANS  Few  Normal respiratory rizwan also present       Gram Stain (Respiratory) Few WBC's     Gram Stain (Respiratory) No organisms seen    Narrative:       Mini-BAL.    Culture, Respiratory with Gram Stain [[id number]] Collected:  11/22/18 1520    Order Status:  Completed Specimen:  Respiratory from Sputum Updated:  11/26/18 0804     Respiratory Culture Normal respiratory rizwan     Gram Stain (Respiratory) <10 epithelial cells per low power field.     Gram Stain (Respiratory) Few WBC's     Gram Stain (Respiratory) No organisms seen    Blood culture [[id number]]     Order Status:  Canceled Specimen:  Blood     Blood culture [[id number]]     Order Status:  Canceled Specimen:  Blood         ABGs:   Recent Labs   Lab 11/28/18  0358 11/29/18  0340   PH 7.474* 7.455*   PCO2 37.9 39.9   PO2 101* 80   HCO3 27.8 28.0   POCSATURATED 98 96   BE 4 4     Cardiac markers:   No results for input(s): CKMB, CPKMB, TROPONINT, TROPONINI, MYOGLOBIN in the last 48 hours.  CMP:   Recent Labs   Lab 11/28/18 0249 11/29/18  0131   * 85   CALCIUM 8.1* 8.3*   ALBUMIN 2.2* 2.4*   PROT 7.0 7.2    143   K 4.1 3.8   CO2 25 26   * 110   BUN 15 16   CREATININE 0.7 0.8   ALKPHOS 70 62   ALT 12 15   AST 30 35   BILITOT 0.4 0.7     CRP: No results for input(s): CRP in the last 48 hours.  ESR: No results for input(s): POCESR, ERYTHROCYTES in the last 48 hours.  LFTs:   Recent Labs   Lab 11/28/18  0249 11/29/18  0131   ALT 12 15   AST 30 35   ALKPHOS 70 62   BILITOT 0.4 0.7   PROT 7.0 7.2   ALBUMIN 2.2* 2.4*     Procalcitonin:   No results for input(s): PROCAL in the last 48 hours.    Significant Diagnostics:  I have reviewed all pertinent imaging results/findings within the past 24 hours.

## 2018-11-29 NOTE — ASSESSMENT & PLAN NOTE
Vent Mode: Spont  Oxygen Concentration (%):  [] 40  Resp Rate Total:  [15 br/min-104 br/min] 31 br/min  Vt Set:  [500 mL] 500 mL  PEEP/CPAP:  [5 cmH20] 5 cmH20  Pressure Support:  [5 cmH20-10 cmH20] 5 cmH20  Mean Airway Pressure:  [7.3 vzT87-62 cmH20] 7.4 cmH20  Abg, cxr  No cuff leak this morning  Start decadron for 48 hours and reassess later today after load dose

## 2018-11-29 NOTE — PROGRESS NOTES
Dr. Amezcua at bedside to assess patient for possible extubation. Patient became increasingly agitated in bed. Heart rate increased to 140s and SBP elevated to 190s-200s. MD verbally ordered to administer Versed 2mg IVP at this time. Order placed. Medication administered. Will continue to monitor.

## 2018-11-29 NOTE — PLAN OF CARE
Problem: Patient Care Overview  Goal: Plan of Care Review  Outcome: Ongoing (interventions implemented as appropriate)  POC reviewed with pt at 0500. Pt intubated and unable to verbalize understanding. No acute events overnight. Pt progressing toward goals. Will continue to monitor. See flowsheets for full assessment and VS info

## 2018-11-29 NOTE — PROGRESS NOTES
[facility]  Neurosurgery  Progress Note    Subjective:     History of Present Illness: Melania Malagon is a 73 y.o. year old female with PMHx of HTN, DM, seizure disorder who lives in [place] and transferred to [facility] with SE and cervical frx for care escalation. Unable to obtain hx due to AMS. Per charting, she had seizure at NH followed by mechanical fall. At OSH, she was intubated for airway protection and loaded with AED. Lab was significant for leucocytosis, lactic acidosis , and elevated ammonia. CT head was negative for acute process. C spine CT shows minimally displaced type 1 samuel frx and type 2 odontoid frx without retropulsion or canal compromise. She is on ASA per charting. NSGY consulted for further evaluation and possible intervention.    Post-Op Info:  * No surgery found *         Interval History: NAEON    Medications:  Continuous Infusions:   dexmedetomidine (PRECEDEX) infusion 0.3 mcg/kg/hr (11/29/18 1100)     Scheduled Meds:   chlorhexidine  15 mL Mouth/Throat BID    dexamethasone  6 mg Intravenous Q8H    levETIRAcetam  750 mg Per NG tube BID    levothyroxine  100 mcg Per NG tube Before breakfast    miconazole NITRATE 2 %   Topical (Top) BID    QUEtiapine  50 mg Per NG tube Daily    sodium chloride 0.9%  3 mL Intravenous Q8H     PRN Meds:albuterol-ipratropium, magnesium oxide, magnesium oxide, ondansetron, potassium chloride 10%, potassium chloride 10%, potassium chloride 10%, potassium, sodium phosphates, potassium, sodium phosphates, potassium, sodium phosphates, senna-docusate 8.6-50 mg, sodium chloride 0.9%     Review of Systems    Objective:     Weight: 84.4 kg (186 lb)  Body mass index is 32.95 kg/m².  Vital Signs (Most Recent):  Temp: 98.7 °F (37.1 °C) (11/29/18 1100)  Pulse: 102 (11/29/18 1138)  Resp: 19 (11/29/18 0505)  BP: (!) 154/69 (11/29/18 1100)  SpO2: 99 % (11/29/18 1138) Vital Signs (24h Range):  Temp:  [98.3 °F (36.8 °C)-98.9 °F (37.2 °C)] 98.7 °F (37.1  °C)  Pulse:  [] 102  Resp:  [15-26] 19  SpO2:  [92 %-100 %] 99 %  BP: ()/() 154/69  Arterial Line BP: ()/() 111/81     Date 11/29/18 0700 - 11/30/18 0659   Shift 0034-8826 4689-6420 7452-8763 24 Hour Total   INTAKE   I.V.(mL/kg) 31.5(0.4)   31.5(0.4)   Shift Total(mL/kg) 31.5(0.4)   31.5(0.4)   OUTPUT   Shift Total(mL/kg)       Weight (kg) 84.4 84.4 84.4 84.4              Vent Mode: Spont  Oxygen Concentration (%):  [] 40  Resp Rate Total:  [15 br/min-104 br/min] 25 br/min  Vt Set:  [500 mL] 500 mL  PEEP/CPAP:  [5 cmH20] 5 cmH20  Pressure Support:  [5 cmH20-10 cmH20] 5 cmH20  Mean Airway Pressure:  [7.3 dmW97-86 cmH20] 7.3 cmH20         NG/OG Tube 11/22/18 Right mouth (Active)   Placement Check placement verified by x-ray 11/23/2018  7:05 PM   Distal Tube Length (cm) 55 11/23/2018  7:05 PM   Tolerance no signs/symptoms of discomfort 11/23/2018  7:05 PM   Securement taped to commercial device 11/23/2018  7:05 PM   Clamp Status/Tolerance clamped 11/23/2018  7:05 PM   Insertion Site Appearance no redness, warmth, tenderness, skin breakdown, drainage 11/23/2018  3:05 PM   Flush/Irrigation flushed w/;water;no resistance met 11/23/2018  7:05 PM   Intake (mL) - Formula Tube Feeding 70 11/23/2018 11:05 AM       Female External Urinary Catheter 11/22/18 1900 (Active)   Skin no redness;no breakdown 11/23/2018  7:05 PM   Tolerance no signs/symptoms of discomfort 11/23/2018  7:05 PM   Suction Continuous suction at 60 mmHg 11/23/2018  7:05 PM   Date of last wick change 11/23/18 11/23/2018  7:05 PM   Time of last wick change 1905 11/23/2018  7:05 PM   Output (mL) 100 mL 11/23/2018  8:00 PM       Neurosurgery Physical Exam    General: On precedex E3VtM6  CNII-XII: PERRL, EOMi, facial symmetry noted  Extremities: FCx4    Significant Labs:  Recent Labs   Lab 11/28/18  0249 11/29/18  0131   * 85    143   K 4.1 3.8   * 110   CO2 25 26   BUN 15 16   CREATININE 0.7 0.8   CALCIUM  8.1* 8.3*   MG 2.0 1.9     Recent Labs   Lab 11/28/18  0249 11/28/18  1921 11/29/18  0131   WBC 7.02 9.87 8.44   HGB 7.0* 7.4* 7.5*   HCT 23.8* 25.8* 26.5*    313 337     Recent Labs   Lab 11/28/18  1557   INR 1.1     Microbiology Results (last 7 days)     Procedure Component Value Units Date/Time    Blood culture (site 1) [[id number]] Collected:  11/22/18 1712    Order Status:  Completed Specimen:  Blood from Peripheral, Antecubital, Left Updated:  11/28/18 0612     Blood Culture, Routine No growth after 5 days.    Narrative:       Site # 1, aerobic and anaerobic    Blood culture (site 2) [[id number]] Collected:  11/22/18 1724    Order Status:  Completed Specimen:  Blood from Peripheral, Wrist, Right Updated:  11/28/18 0612     Blood Culture, Routine No growth after 5 days.    Narrative:       Site # 2, aerobic only    Culture, Respiratory with Gram Stain [[id number]] Collected:  11/23/18 1132    Order Status:  Completed Specimen:  Respiratory from Endotracheal Aspirate Updated:  11/26/18 1416     Respiratory Culture --     OKSANA ALBICANS  Few  Normal respiratory rizwan also present       Gram Stain (Respiratory) Few WBC's     Gram Stain (Respiratory) No organisms seen    Narrative:       Mini-BAL.    Culture, Respiratory with Gram Stain [[id number]] Collected:  11/22/18 1520    Order Status:  Completed Specimen:  Respiratory from Sputum Updated:  11/26/18 0804     Respiratory Culture Normal respiratory rizwan     Gram Stain (Respiratory) <10 epithelial cells per low power field.     Gram Stain (Respiratory) Few WBC's     Gram Stain (Respiratory) No organisms seen    Blood culture [[id number]]     Order Status:  Canceled Specimen:  Blood     Blood culture [[id number]]     Order Status:  Canceled Specimen:  Blood         ABGs:   Recent Labs   Lab 11/28/18  0358 11/29/18  0340   PH 7.474* 7.455*   PCO2 37.9 39.9   PO2 101* 80   HCO3 27.8 28.0   POCSATURATED 98 96   BE 4 4     Cardiac markers:   No results for  input(s): CKMB, CPKMB, TROPONINT, TROPONINI, MYOGLOBIN in the last 48 hours.  CMP:   Recent Labs   Lab 11/28/18 0249 11/29/18 0131   * 85   CALCIUM 8.1* 8.3*   ALBUMIN 2.2* 2.4*   PROT 7.0 7.2    143   K 4.1 3.8   CO2 25 26   * 110   BUN 15 16   CREATININE 0.7 0.8   ALKPHOS 70 62   ALT 12 15   AST 30 35   BILITOT 0.4 0.7     CRP: No results for input(s): CRP in the last 48 hours.  ESR: No results for input(s): POCESR, ERYTHROCYTES in the last 48 hours.  LFTs:   Recent Labs   Lab 11/28/18 0249 11/29/18 0131   ALT 12 15   AST 30 35   ALKPHOS 70 62   BILITOT 0.4 0.7   PROT 7.0 7.2   ALBUMIN 2.2* 2.4*     Procalcitonin:   No results for input(s): PROCAL in the last 48 hours.    Significant Diagnostics:  I have reviewed all pertinent imaging results/findings within the past 24 hours.    Assessment/Plan:     * Cervical spine fracture    72 yo female admitted to [facility] with NCSE also found to have type I atlas and type II odontoid fractures.    No acute events overnight. Pt remains intubated. E4VTM6.    --Continue care per primary team.  --Continue AED regimen per primary team.  --Continue cervical orthosis in rigid collar at all times  --wean to extubate.  --[place] fracture is stable, odontoid fracture is unstable may be amenable to surgical intervention, will likely do as outpatient as patient is neurologically stable  --We will continue to monitor closely, please contact us with any questions or concerns.           Jose Mccormick MD  Neurosurgery  [facility]

## 2018-11-29 NOTE — PROGRESS NOTES
2[address] [facility] notified, pt had 9 beat run of V-tach. VS otherwise stable at this time. Strip printed and placed in chart for team to view. Per CRISTY Jamison, no new orders at this time. Will continue to monitor very closely.

## 2018-11-29 NOTE — PLAN OF CARE
Problem: Patient Care Overview  Goal: Plan of Care Review  Outcome: Ongoing (interventions implemented as appropriate)  Plan of care reviewed with patient and daughter via telephone at 1300. Patient's daughter verbalized understanding; no evidence of learning from patient at this time due to intubation. All questions and concerns addressed today. Patient remains free from injury and falls. No acute events today. Patient is progressing towards goals. Will continue to monitor. See flowsheets for full assessments and vitals throughout shift.    -Patient bathed today  -Patient had 2 BM today  -Patient given dexamethasome 10mg IVP today, weaning parameters set, and patient to be extubated later today  -Patient remains on Precedex infusion PRN agitation

## 2018-11-30 ENCOUNTER — TELEPHONE (OUTPATIENT)
Dept: PAIN MEDICINE | Facility: CLINIC | Age: 73
End: 2018-11-30

## 2018-11-30 PROBLEM — L82.1 SEBORRHEIC KERATOSES: Status: RESOLVED | Noted: 2018-11-26 | Resolved: 2018-11-30

## 2018-11-30 PROBLEM — I47.20 V-TACH: Status: RESOLVED | Noted: 2018-11-27 | Resolved: 2018-11-30

## 2018-11-30 PROBLEM — D72.829 LEUKOCYTOSIS: Status: RESOLVED | Noted: 2018-11-22 | Resolved: 2018-11-30

## 2018-11-30 PROBLEM — R00.1 BRADYCARDIA: Status: RESOLVED | Noted: 2018-11-28 | Resolved: 2018-11-30

## 2018-11-30 LAB
ALBUMIN SERPL BCP-MCNC: 2.4 G/DL
ALLENS TEST: ABNORMAL
ALLENS TEST: ABNORMAL
ALP SERPL-CCNC: 67 U/L
ALT SERPL W/O P-5'-P-CCNC: 19 U/L
ANION GAP SERPL CALC-SCNC: 7 MMOL/L
AST SERPL-CCNC: 38 U/L
BASOPHILS # BLD AUTO: 0.01 K/UL
BASOPHILS NFR BLD: 0.1 %
BILIRUB SERPL-MCNC: 0.6 MG/DL
BUN SERPL-MCNC: 17 MG/DL
CALCIUM SERPL-MCNC: 8.4 MG/DL
CHLORIDE SERPL-SCNC: 109 MMOL/L
CO2 SERPL-SCNC: 25 MMOL/L
CREAT SERPL-MCNC: 0.8 MG/DL
DELSYS: ABNORMAL
DIFFERENTIAL METHOD: ABNORMAL
EOSINOPHIL # BLD AUTO: 0 K/UL
EOSINOPHIL NFR BLD: 0 %
ERYTHROCYTE [DISTWIDTH] IN BLOOD BY AUTOMATED COUNT: 21.1 %
EST. GFR  (AFRICAN AMERICAN): >60 ML/MIN/1.73 M^2
EST. GFR  (NON AFRICAN AMERICAN): >60 ML/MIN/1.73 M^2
FIO2: 100
GLUCOSE SERPL-MCNC: 138 MG/DL
HCO3 UR-SCNC: 24.1 MMOL/L (ref 24–28)
HCO3 UR-SCNC: 28 MMOL/L (ref 24–28)
HCT VFR BLD AUTO: 28 %
HGB BLD-MCNC: 8.3 G/DL
IMM GRANULOCYTES # BLD AUTO: 0.03 K/UL
IMM GRANULOCYTES NFR BLD AUTO: 0.4 %
LYMPHOCYTES # BLD AUTO: 1.2 K/UL
LYMPHOCYTES NFR BLD: 16 %
MAGNESIUM SERPL-MCNC: 2 MG/DL
MAP: 7.5
MCH RBC QN AUTO: 24.3 PG
MCHC RBC AUTO-ENTMCNC: 29.6 G/DL
MCV RBC AUTO: 82 FL
MIN VOL: 9.12
MODE: ABNORMAL
MONOCYTES # BLD AUTO: 0.2 K/UL
MONOCYTES NFR BLD: 3 %
NEUTROPHILS # BLD AUTO: 6.1 K/UL
NEUTROPHILS NFR BLD: 80.5 %
NRBC BLD-RTO: 0 /100 WBC
PCO2 BLDA: 34.5 MMHG (ref 35–45)
PCO2 BLDA: 43 MMHG (ref 35–45)
PEEP: 5
PH SMN: 7.42 [PH] (ref 7.35–7.45)
PH SMN: 7.45 [PH] (ref 7.35–7.45)
PHOSPHATE SERPL-MCNC: 3.2 MG/DL
PLATELET # BLD AUTO: 362 K/UL
PMV BLD AUTO: 9.6 FL
PO2 BLDA: 145 MMHG (ref 80–100)
PO2 BLDA: 62 MMHG (ref 80–100)
POC BE: 0 MMOL/L
POC BE: 4 MMOL/L
POC SATURATED O2: 93 % (ref 95–100)
POC SATURATED O2: 99 % (ref 95–100)
POC TCO2: 25 MMOL/L (ref 23–27)
POC TCO2: 29 MMOL/L (ref 23–27)
POTASSIUM SERPL-SCNC: 4.5 MMOL/L
PROT SERPL-MCNC: 7.7 G/DL
PS: 7
RBC # BLD AUTO: 3.41 M/UL
SAMPLE: ABNORMAL
SAMPLE: ABNORMAL
SITE: ABNORMAL
SITE: ABNORMAL
SODIUM SERPL-SCNC: 141 MMOL/L
SP02: 50
SPONT RATE: 18
VOL: 479
WBC # BLD AUTO: 7.63 K/UL

## 2018-11-30 PROCEDURE — 94761 N-INVAS EAR/PLS OXIMETRY MLT: CPT

## 2018-11-30 PROCEDURE — 94010 BREATHING CAPACITY TEST: CPT

## 2018-11-30 PROCEDURE — 99900035 HC TECH TIME PER 15 MIN (STAT)

## 2018-11-30 PROCEDURE — 36600 WITHDRAWAL OF ARTERIAL BLOOD: CPT

## 2018-11-30 PROCEDURE — 80053 COMPREHEN METABOLIC PANEL: CPT

## 2018-11-30 PROCEDURE — 84100 ASSAY OF PHOSPHORUS: CPT

## 2018-11-30 PROCEDURE — 27000221 HC OXYGEN, UP TO 24 HOURS

## 2018-11-30 PROCEDURE — 25000003 PHARM REV CODE 250: Performed by: PSYCHIATRY & NEUROLOGY

## 2018-11-30 PROCEDURE — 99291 CRITICAL CARE FIRST HOUR: CPT | Mod: GC,,, | Performed by: PSYCHIATRY & NEUROLOGY

## 2018-11-30 PROCEDURE — 94150 VITAL CAPACITY TEST: CPT

## 2018-11-30 PROCEDURE — 99900026 HC AIRWAY MAINTENANCE (STAT)

## 2018-11-30 PROCEDURE — A4216 STERILE WATER/SALINE, 10 ML: HCPCS | Performed by: NURSE PRACTITIONER

## 2018-11-30 PROCEDURE — 82803 BLOOD GASES ANY COMBINATION: CPT

## 2018-11-30 PROCEDURE — 63600175 PHARM REV CODE 636 W HCPCS: Performed by: NURSE PRACTITIONER

## 2018-11-30 PROCEDURE — 25000003 PHARM REV CODE 250: Performed by: NURSE PRACTITIONER

## 2018-11-30 PROCEDURE — 83735 ASSAY OF MAGNESIUM: CPT

## 2018-11-30 PROCEDURE — 85025 COMPLETE CBC W/AUTO DIFF WBC: CPT

## 2018-11-30 PROCEDURE — 99232 SBSQ HOSP IP/OBS MODERATE 35: CPT | Mod: GC,,, | Performed by: NEUROLOGICAL SURGERY

## 2018-11-30 PROCEDURE — 20000000 HC ICU ROOM

## 2018-11-30 RX ORDER — FERROUS SULFATE 325(65) MG
325 TABLET, DELAYED RELEASE (ENTERIC COATED) ORAL 2 TIMES DAILY
Status: DISCONTINUED | OUTPATIENT
Start: 2018-11-30 | End: 2018-12-11 | Stop reason: HOSPADM

## 2018-11-30 RX ORDER — HEPARIN SODIUM 5000 [USP'U]/ML
5000 INJECTION, SOLUTION INTRAVENOUS; SUBCUTANEOUS EVERY 8 HOURS
Status: DISCONTINUED | OUTPATIENT
Start: 2018-11-30 | End: 2018-12-11 | Stop reason: HOSPADM

## 2018-11-30 RX ADMIN — CHLORHEXIDINE GLUCONATE 0.12% ORAL RINSE 15 ML: 1.2 LIQUID ORAL at 09:11

## 2018-11-30 RX ADMIN — LEVETIRACETAM 750 MG: 750 TABLET ORAL at 08:11

## 2018-11-30 RX ADMIN — DEXMEDETOMIDINE HYDROCHLORIDE 0.2 MCG/KG/HR: 100 INJECTION, SOLUTION, CONCENTRATE INTRAVENOUS at 08:11

## 2018-11-30 RX ADMIN — DEXAMETHASONE SODIUM PHOSPHATE 6 MG: 4 INJECTION, SOLUTION INTRAMUSCULAR; INTRAVENOUS at 09:11

## 2018-11-30 RX ADMIN — DEXAMETHASONE SODIUM PHOSPHATE 6 MG: 4 INJECTION, SOLUTION INTRAMUSCULAR; INTRAVENOUS at 02:11

## 2018-11-30 RX ADMIN — LEVOTHYROXINE SODIUM 100 MCG: 100 TABLET ORAL at 05:11

## 2018-11-30 RX ADMIN — DEXAMETHASONE SODIUM PHOSPHATE 6 MG: 4 INJECTION, SOLUTION INTRAMUSCULAR; INTRAVENOUS at 05:11

## 2018-11-30 RX ADMIN — HEPARIN SODIUM 5000 UNITS: 5000 INJECTION, SOLUTION INTRAVENOUS; SUBCUTANEOUS at 09:11

## 2018-11-30 RX ADMIN — LEVETIRACETAM 750 MG: 750 TABLET ORAL at 09:11

## 2018-11-30 RX ADMIN — FERROUS SULFATE TAB EC 325 MG (65 MG FE EQUIVALENT) 325 MG: 325 (65 FE) TABLET DELAYED RESPONSE at 08:11

## 2018-11-30 RX ADMIN — QUETIAPINE FUMARATE 50 MG: 25 TABLET ORAL at 03:11

## 2018-11-30 RX ADMIN — MICONAZOLE NITRATE: 20 POWDER TOPICAL at 10:11

## 2018-11-30 RX ADMIN — Medication 3 ML: at 05:11

## 2018-11-30 RX ADMIN — FAMOTIDINE 20 MG: 20 TABLET ORAL at 09:11

## 2018-11-30 RX ADMIN — MICONAZOLE NITRATE: 20 POWDER TOPICAL at 09:11

## 2018-11-30 RX ADMIN — Medication 3 ML: at 02:11

## 2018-11-30 RX ADMIN — HEPARIN SODIUM 5000 UNITS: 5000 INJECTION, SOLUTION INTRAVENOUS; SUBCUTANEOUS at 02:11

## 2018-11-30 NOTE — ASSESSMENT & PLAN NOTE
MRI c-spine without cord compression, contusion or hematoma  CT c-spine with minimally displaced fractures of the posterior arch of the C1 vertebral body in keeping with type 1 [place] fracture and Type 2 odontoid fracture  C-collar in place  Per NSGY no plans to surgically intervene at the moment  PT OT SLP

## 2018-11-30 NOTE — TELEPHONE ENCOUNTER
----- Message from oTn Barry MD sent at 11/30/2018 11:06 AM CST -----  Please arrange for pt to be seen in clinic in 2 weeks in two weeks to discuss elective management of odontoid fracture.

## 2018-11-30 NOTE — PLAN OF CARE
Problem: Patient Care Overview  Goal: Plan of Care Review  Outcome: Ongoing (interventions implemented as appropriate)  POC reviewed with pt at 0415. Precedex gtt weaned off. Pt remained on spontaneous breathing mode throughout night. Pt voiding spontaneously via purewick. Pt remains AAOx4 and continues to follow commands. No acute events overnight. Pt progressing toward goals. Will continue to monitor. See flowsheets for full assessment and VS info

## 2018-11-30 NOTE — PROGRESS NOTES
Pt extubated per MD orders. Dr. Amezcua at bedside. Pt tolerated extubation well with no distress noted. Pt is now on an aerosol facemask @5L,28%. Will continue to monitor.

## 2018-11-30 NOTE — SUBJECTIVE & OBJECTIVE
Medications:  Continuous Infusions:   dexmedetomidine (PRECEDEX) infusion Stopped (11/29/18 8016)     Scheduled Meds:   dexamethasone  6 mg Intravenous Q8H    ferrous sulfate  325 mg Oral BID    heparin (porcine)  5,000 Units Subcutaneous Q8H    levETIRAcetam  750 mg Per NG tube BID    levothyroxine  100 mcg Per NG tube Before breakfast    miconazole NITRATE 2 %   Topical (Top) BID    QUEtiapine  50 mg Per NG tube Daily    sodium chloride 0.9%  3 mL Intravenous Q8H     PRN Meds:albuterol-ipratropium, magnesium oxide, magnesium oxide, ondansetron, potassium chloride 10%, potassium chloride 10%, potassium chloride 10%, potassium, sodium phosphates, potassium, sodium phosphates, potassium, sodium phosphates, senna-docusate 8.6-50 mg, sodium chloride 0.9%     Review of Systems    Objective:     Weight: 84.4 kg (186 lb)  Body mass index is 32.95 kg/m².  Vital Signs (Most Recent):  Temp: 98 °F (36.7 °C) (11/30/18 0701)  Pulse: (!) 119 (11/30/18 1000)  Resp: (!) 24 (11/30/18 1000)  BP: (!) 150/67 (11/30/18 1000)  SpO2: 100 % (11/30/18 1000) Vital Signs (24h Range):  Temp:  [98 °F (36.7 °C)-99.8 °F (37.7 °C)] 98 °F (36.7 °C)  Pulse:  [] 119  Resp:  [14-33] 24  SpO2:  [97 %-100 %] 100 %  BP: (120-182)/() 150/67  Arterial Line BP: ()/() 148/118                 Vent Mode: Spont  Oxygen Concentration (%):  [] 51  Resp Rate Total:  [14 br/min-32 br/min] 20 br/min  PEEP/CPAP:  [5 cmH20] 5 cmH20  Pressure Support:  [5 cmH20-7 cmH20] 7 cmH20  Mean Airway Pressure:  [7.1 cmH20-7.8 cmH20] 7.8 cmH20         NG/OG Tube 11/22/18 Right mouth (Active)   Placement Check placement verified by x-ray 11/23/2018  7:05 PM   Distal Tube Length (cm) 55 11/23/2018  7:05 PM   Tolerance no signs/symptoms of discomfort 11/23/2018  7:05 PM   Securement taped to commercial device 11/23/2018  7:05 PM   Clamp Status/Tolerance clamped 11/23/2018  7:05 PM   Insertion Site Appearance no redness, warmth, tenderness,  skin breakdown, drainage 11/23/2018  3:05 PM   Flush/Irrigation flushed w/;water;no resistance met 11/23/2018  7:05 PM   Intake (mL) - Formula Tube Feeding 70 11/23/2018 11:05 AM       Female External Urinary Catheter 11/22/18 1900 (Active)   Skin no redness;no breakdown 11/23/2018  7:05 PM   Tolerance no signs/symptoms of discomfort 11/23/2018  7:05 PM   Suction Continuous suction at 60 mmHg 11/23/2018  7:05 PM   Date of last wick change 11/23/18 11/23/2018  7:05 PM   Time of last wick change 1905 11/23/2018  7:05 PM   Output (mL) 100 mL 11/23/2018  8:00 PM       Neurosurgery Physical Exam    General: On precedex E3VtM6  CNII-XII: PERRL, EOMi, facial symmetry noted  Extremities: FCx4    Significant Labs:  Recent Labs   Lab 11/29/18  0131 11/30/18  0116   GLU 85 138*    141   K 3.8 4.5    109   CO2 26 25   BUN 16 17   CREATININE 0.8 0.8   CALCIUM 8.3* 8.4*   MG 1.9 2.0     Recent Labs   Lab 11/28/18  1921 11/29/18  0131 11/30/18  0116   WBC 9.87 8.44 7.63   HGB 7.4* 7.5* 8.3*   HCT 25.8* 26.5* 28.0*    337 362*     Recent Labs   Lab 11/28/18  1557   INR 1.1     Microbiology Results (last 7 days)     Procedure Component Value Units Date/Time    Blood culture (site 1) [[id number]] Collected:  11/22/18 1712    Order Status:  Completed Specimen:  Blood from Peripheral, Antecubital, Left Updated:  11/28/18 0612     Blood Culture, Routine No growth after 5 days.    Narrative:       Site # 1, aerobic and anaerobic    Blood culture (site 2) [[id number]] Collected:  11/22/18 1724    Order Status:  Completed Specimen:  Blood from Peripheral, Wrist, Right Updated:  11/28/18 0612     Blood Culture, Routine No growth after 5 days.    Narrative:       Site # 2, aerobic only    Culture, Respiratory with Gram Stain [[id number]] Collected:  11/23/18 1132    Order Status:  Completed Specimen:  Respiratory from Endotracheal Aspirate Updated:  11/26/18 1416     Respiratory Culture --     OKSANA ALBICANS  Few  Normal  respiratory rizwan also present       Gram Stain (Respiratory) Few WBC's     Gram Stain (Respiratory) No organisms seen    Narrative:       Mini-BAL.    Culture, Respiratory with Gram Stain [[id number]] Collected:  11/22/18 1520    Order Status:  Completed Specimen:  Respiratory from Sputum Updated:  11/26/18 0804     Respiratory Culture Normal respiratory rizwan     Gram Stain (Respiratory) <10 epithelial cells per low power field.     Gram Stain (Respiratory) Few WBC's     Gram Stain (Respiratory) No organisms seen        ABGs:   Recent Labs   Lab 11/29/18  0340 11/30/18  0603   PH 7.455* 7.423   PCO2 39.9 43.0   PO2 80 145*   HCO3 28.0 28.0   POCSATURATED 96 99   BE 4 4     Cardiac markers:   No results for input(s): CKMB, CPKMB, TROPONINT, TROPONINI, MYOGLOBIN in the last 48 hours.  CMP:   Recent Labs   Lab 11/29/18  0131 11/30/18  0116   GLU 85 138*   CALCIUM 8.3* 8.4*   ALBUMIN 2.4* 2.4*   PROT 7.2 7.7    141   K 3.8 4.5   CO2 26 25    109   BUN 16 17   CREATININE 0.8 0.8   ALKPHOS 62 67   ALT 15 19   AST 35 38   BILITOT 0.7 0.6     CRP: No results for input(s): CRP in the last 48 hours.  ESR: No results for input(s): POCESR, ERYTHROCYTES in the last 48 hours.  LFTs:   Recent Labs   Lab 11/29/18  0131 11/30/18  0116   ALT 15 19   AST 35 38   ALKPHOS 62 67   BILITOT 0.7 0.6   PROT 7.2 7.7   ALBUMIN 2.4* 2.4*     Procalcitonin:   No results for input(s): PROCAL in the last 48 hours.    Significant Diagnostics:  I have reviewed all pertinent imaging results/findings within the past 24 hours.

## 2018-11-30 NOTE — PLAN OF CARE
Problem: Patient Care Overview  Goal: Plan of Care Review  Outcome: Ongoing (interventions implemented as appropriate)  Plan of care reviewed with patient and daughter at 1400. Patient verbalized understanding. All questions and concerns addressed today. Patient remains free from injury and falls. No acute events. Patient progressing towards goal. Will continue to monitor. See flowsheet for full assessment and vitals throughout shift.

## 2018-11-30 NOTE — ASSESSMENT & PLAN NOTE
72 yo female admitted to [facility] with NCSE also found to have type I atlas and type II odontoid fractures.    No acute events overnight. Pt remains intubated. E4VTM6.    --Continue care per primary team.  --[place] fracture is stable, odontoid fracture is unstable may be amenable to surgical intervention, will plan for outpatient discussion as patient is neurologically stable.  --Continue rigid cervical orthosis in collar at all times.  --Will arrange for pt to follow up in outpatient clinic in two weeks to discuss potential management options.  --Neurosurgery will be signing off, please contact us with any questions or concerns.

## 2018-11-30 NOTE — PROGRESS NOTES
[facility]  Neurosurgery  Progress Note    Subjective:     History of Present Illness: Melania Malagon is a 73 y.o. year old female with PMHx of HTN, DM, seizure disorder who lives in NH and transferred to [facility] with SE and cervical frx for care escalation. Unable to obtain hx due to AMS. Per charting, she had seizure at NH followed by mechanical fall. At OSH, she was intubated for airway protection and loaded with AED. Lab was significant for leucocytosis, lactic acidosis , and elevated ammonia. CT head was negative for acute process. C spine CT shows minimally displaced type 1 samuel frx and type 2 odontoid frx without retropulsion or canal compromise. She is on ASA per charting. NSGY consulted for further evaluation and possible intervention.    Post-Op Info:  * No surgery found *             Medications:  Continuous Infusions:   dexmedetomidine (PRECEDEX) infusion Stopped (11/29/18 2496)     Scheduled Meds:   dexamethasone  6 mg Intravenous Q8H    ferrous sulfate  325 mg Oral BID    heparin (porcine)  5,000 Units Subcutaneous Q8H    levETIRAcetam  750 mg Per NG tube BID    levothyroxine  100 mcg Per NG tube Before breakfast    miconazole NITRATE 2 %   Topical (Top) BID    QUEtiapine  50 mg Per NG tube Daily    sodium chloride 0.9%  3 mL Intravenous Q8H     PRN Meds:albuterol-ipratropium, magnesium oxide, magnesium oxide, ondansetron, potassium chloride 10%, potassium chloride 10%, potassium chloride 10%, potassium, sodium phosphates, potassium, sodium phosphates, potassium, sodium phosphates, senna-docusate 8.6-50 mg, sodium chloride 0.9%     Review of Systems    Objective:     Weight: 84.4 kg (186 lb)  Body mass index is 32.95 kg/m².  Vital Signs (Most Recent):  Temp: 98 °F (36.7 °C) (11/30/18 0701)  Pulse: (!) 119 (11/30/18 1000)  Resp: (!) 24 (11/30/18 1000)  BP: (!) 150/67 (11/30/18 1000)  SpO2: 100 % (11/30/18 1000) Vital Signs (24h Range):  Temp:  [98 °F (36.7 °C)-99.8 °F (37.7  °C)] 98 °F (36.7 °C)  Pulse:  [] 119  Resp:  [14-33] 24  SpO2:  [97 %-100 %] 100 %  BP: (120-182)/() 150/67  Arterial Line BP: ()/() 148/118                 Vent Mode: Spont  Oxygen Concentration (%):  [] 51  Resp Rate Total:  [14 br/min-32 br/min] 20 br/min  PEEP/CPAP:  [5 cmH20] 5 cmH20  Pressure Support:  [5 cmH20-7 cmH20] 7 cmH20  Mean Airway Pressure:  [7.1 cmH20-7.8 cmH20] 7.8 cmH20         NG/OG Tube 11/22/18 Right mouth (Active)   Placement Check placement verified by x-ray 11/23/2018  7:05 PM   Distal Tube Length (cm) 55 11/23/2018  7:05 PM   Tolerance no signs/symptoms of discomfort 11/23/2018  7:05 PM   Securement taped to commercial device 11/23/2018  7:05 PM   Clamp Status/Tolerance clamped 11/23/2018  7:05 PM   Insertion Site Appearance no redness, warmth, tenderness, skin breakdown, drainage 11/23/2018  3:05 PM   Flush/Irrigation flushed w/;water;no resistance met 11/23/2018  7:05 PM   Intake (mL) - Formula Tube Feeding 70 11/23/2018 11:05 AM       Female External Urinary Catheter 11/22/18 1900 (Active)   Skin no redness;no breakdown 11/23/2018  7:05 PM   Tolerance no signs/symptoms of discomfort 11/23/2018  7:05 PM   Suction Continuous suction at 60 mmHg 11/23/2018  7:05 PM   Date of last wick change 11/23/18 11/23/2018  7:05 PM   Time of last wick change 1905 11/23/2018  7:05 PM   Output (mL) 100 mL 11/23/2018  8:00 PM       Neurosurgery Physical Exam    General: On precedex E3VtM6  CNII-XII: PERRL, EOMi, facial symmetry noted  Extremities: FCx4    Significant Labs:  Recent Labs   Lab 11/29/18  0131 11/30/18  0116   GLU 85 138*    141   K 3.8 4.5    109   CO2 26 25   BUN 16 17   CREATININE 0.8 0.8   CALCIUM 8.3* 8.4*   MG 1.9 2.0     Recent Labs   Lab 11/28/18  1921 11/29/18  0131 11/30/18  0116   WBC 9.87 8.44 7.63   HGB 7.4* 7.5* 8.3*   HCT 25.8* 26.5* 28.0*    337 362*     Recent Labs   Lab 11/28/18  1557   INR 1.1     Microbiology Results (last 7  days)     Procedure Component Value Units Date/Time    Blood culture (site 1) [[id number]] Collected:  11/22/18 1712    Order Status:  Completed Specimen:  Blood from Peripheral, Antecubital, Left Updated:  11/28/18 0612     Blood Culture, Routine No growth after 5 days.    Narrative:       Site # 1, aerobic and anaerobic    Blood culture (site 2) [[id number]] Collected:  11/22/18 1724    Order Status:  Completed Specimen:  Blood from Peripheral, Wrist, Right Updated:  11/28/18 0612     Blood Culture, Routine No growth after 5 days.    Narrative:       Site # 2, aerobic only    Culture, Respiratory with Gram Stain [[id number]] Collected:  11/23/18 1132    Order Status:  Completed Specimen:  Respiratory from Endotracheal Aspirate Updated:  11/26/18 1416     Respiratory Culture --     OKSANA ALBICANS  Few  Normal respiratory rizwan also present       Gram Stain (Respiratory) Few WBC's     Gram Stain (Respiratory) No organisms seen    Narrative:       Mini-BAL.    Culture, Respiratory with Gram Stain [[id number]] Collected:  11/22/18 1520    Order Status:  Completed Specimen:  Respiratory from Sputum Updated:  11/26/18 0804     Respiratory Culture Normal respiratory rizwan     Gram Stain (Respiratory) <10 epithelial cells per low power field.     Gram Stain (Respiratory) Few WBC's     Gram Stain (Respiratory) No organisms seen        ABGs:   Recent Labs   Lab 11/29/18  0340 11/30/18  0603   PH 7.455* 7.423   PCO2 39.9 43.0   PO2 80 145*   HCO3 28.0 28.0   POCSATURATED 96 99   BE 4 4     Cardiac markers:   No results for input(s): CKMB, CPKMB, TROPONINT, TROPONINI, MYOGLOBIN in the last 48 hours.  CMP:   Recent Labs   Lab 11/29/18  0131 11/30/18  0116   GLU 85 138*   CALCIUM 8.3* 8.4*   ALBUMIN 2.4* 2.4*   PROT 7.2 7.7    141   K 3.8 4.5   CO2 26 25    109   BUN 16 17   CREATININE 0.8 0.8   ALKPHOS 62 67   ALT 15 19   AST 35 38   BILITOT 0.7 0.6     CRP: No results for input(s): CRP in the last 48 hours.  ESR: No  results for input(s): POCESR, ERYTHROCYTES in the last 48 hours.  LFTs:   Recent Labs   Lab 11/29/18  0131 11/30/18  0116   ALT 15 19   AST 35 38   ALKPHOS 62 67   BILITOT 0.7 0.6   PROT 7.2 7.7   ALBUMIN 2.4* 2.4*     Procalcitonin:   No results for input(s): PROCAL in the last 48 hours.    Significant Diagnostics:  I have reviewed all pertinent imaging results/findings within the past 24 hours.    Assessment/Plan:     * Cervical spine fracture    74 yo female admitted to [facility] with NCSE also found to have type I atlas and type II odontoid fractures.    No acute events overnight. Pt remains intubated. E4VTM6.    --Continue care per primary team.  --[place] fracture is stable, odontoid fracture is unstable may be amenable to surgical intervention, will plan for outpatient discussion as patient is neurologically stable.  --Continue rigid cervical orthosis in collar at all times.  --Will arrange for pt to follow up in outpatient clinic in two weeks to discuss potential management options.  --Neurosurgery will be signing off, please contact us with any questions or concerns.           Ton Barry MD  Neurosurgery  [facility]

## 2018-11-30 NOTE — PROGRESS NOTES
[facility]  Neurocritical Care  Progress Note    Admit Date: 11/22/2018  Service Date: 11/30/2018  Length of Stay: 8    Subjective:     Chief Complaint: Cervical spine fracture    History of Present Illness: 73 y F transferred from [place] for cervical spine injury and seizure activity. She has pmh of HTN, DM, seizure disorder and had an unwitnessed seizure (presumed) at nursing home today followed by fall. On arrival by EMS she was responding minimally. Did not require CPR. She had one seizure en route to hospital at [place] and received versed. At ER at OSH, patient had another witnessed seizure with tonic clonic activity in extremities and became unresponsive. She was intubated, and given iv fosphenytoin.  CTH was negative. CT c-spine showed odontoid fracture and C1 fracture. She received versed and fosphenytoin and OSH, when she was transferred by helicopter to [facility], en route she was started on ketamine gtt at 0.5 mcg/kg/min. She was transferred to [facility] for cervical fracture requiring neurosurg intervention and also seizure monitoring and control.      TO note  Patient had recent pneumonia and GI bleed earlier this month and was treated at OSH. She was discharged on 11/20 with levaquin for pneumonia and returned to NH     Hospital Course: 11/22: admit to Neuro ICU for higher level of care. Neurosurgery consulted for C-spine fracture. MRI c-spine ordered. Continous EEG monitoring. Epilepsy consulted. Loaded keppra and maintenance keppra  11/26: seroquel, derm consult, discussion with NSGY regarding surgical plan, daughter updated at bedside  11/27: ECG, increase seroquel, Vtach this morning, then bradycardia, wean off precedex, consult nutrition for TF change, Husks x 3 days, obtain smaller MJcollar  11/28: weaning parameters again, stop vimpat, metoprolol, decrease keppra, wean precedex, anemia workup, bradycardia with hypotension event this morning  11/29: no cuff leak this morning, start  decadron then re-eval later  11/30: extubate today, start SQH, PT OT SLP    Review of Symptoms:   Constitutional: Denies fevers or chills.  ENT: no hearing difficulty, no visual changes  Pulmonary: Denies shortness of breath or cough.  Cardiology: Denies chest pain or palpitations.  GI: Denies abdominal pain or constipation.  Neurologic: Denies new weakness, headaches, or paresthesias.   : no dysuria  Musk: no muscle pain, no joint pain  Psych: no hallucinations    Physical Exam:  GA: Alert, comfortable, no acute distress  HEENT: No scleral icterus or JVD.   Pulmonary: Clear to auscultation A/L. No wheezing, crackles, or rhonchi.  Cardiac: RRR S1 & S2 w/o rubs/murmurs/gallops.   Abdominal: Bowel sounds present x 4. No appreciable hepatosplenomegaly.  Skin: No jaundice, rashes, or visible lesions.  Pulses: 1+ DP bilat    Neuro:  --sedation: precedex  --GCS: E4V5M6  --Mental Status:  Awake oriented to self and place, situation, date and time intermittent  --CN II-XII grossly intact.   --Pupils 3-->2mm, PERRL.   --brainstem: intact  --Motor: 5/5 motor throughout  --sensory: intact to soft touch and pain throughout  --Reflexes: not tested  --Gait: deferred    Recent Labs   Lab 11/30/18  0116   WBC 7.63   RBC 3.41*   HGB 8.3*   HCT 28.0*   *   MCV 82   MCH 24.3*   MCHC 29.6*     Recent Labs   Lab 11/30/18  0116   CALCIUM 8.4*   PROT 7.7      K 4.5   CO2 25      BUN 17   CREATININE 0.8   ALKPHOS 67   ALT 19   AST 38   BILITOT 0.6     No results for input(s): PT, INR, APTT in the last 24 hours.  Vent Mode: Spont  Oxygen Concentration (%):  [] 51  Resp Rate Total:  [14 br/min-32 br/min] 24 br/min  PEEP/CPAP:  [5 cmH20] 5 cmH20  Pressure Support:  [5 cmH20-7 cmH20] 7 cmH20  Mean Airway Pressure:  [7.1 cmH20-7.8 cmH20] 7.8 cmH20    Temp: 98 °F (36.7 °C)  Pulse: 96  Rhythm: normal sinus rhythm  BP: (!) 158/67  MAP (mmHg): 97  Resp: (!) 23  SpO2: 99 %  Oxygen Concentration (%): 51  O2 Device (Oxygen  Therapy): ventilator  Vent Mode: Spont  Pressure Support: 7 cmH20  PEEP/CPAP: 5 cmH20  Peak Airway Pressure: 13 cmH2O  Mean Airway Pressure: 7.8 cmH20  Plateau Pressure: 0 cmH20    Temp  Min: 98 °F (36.7 °C)  Max: 99.8 °F (37.7 °C)  Pulse  Min: 50  Max: 121  BP  Min: 120/58  Max: 182/77  MAP (mmHg)  Min: 83  Max: 120  Resp  Min: 14  Max: 33  SpO2  Min: 97 %  Max: 100 %  Oxygen Concentration (%)  Min: 40  Max: 100    11/29 0701 - 11/30 0700  In: 127.8 [I.V.:127.8]  Out: 450 [Urine:450]   Unmeasured Output  Urine Occurrence: 0  Stool Occurrence: 0  Pad Count: 3    Nutrition Prescription Ordered  Current Diet Order: NPO  Nutrition Order Comments: TF held  Current Nutrition Support Formula Ordered: Glucerna 1.5  Current Nutrition Support Rate Ordered: 45 (ml)  Current Nutrition Support Frequency Ordered: mL/hr  Last Bowel Movement: 11/29/18    Body mass index is 32.95 kg/m².      I have personally reviewed all labs, imaging, and studies today      Assessment/Plan:     Neuro   * Cervical spine fracture    MRI c-spine without cord compression, contusion or hematoma  CT c-spine with minimally displaced fractures of the posterior arch of the C1 vertebral body in keeping with type 1 [place] fracture and Type 2 odontoid fracture  C-collar in place  Per NSGY no plans to surgically intervene at the moment  PT OT SLP      Kell coma scale total score 9-12    Multifactorial  Continue to monitor  More sleepy today  Continue seroquel  Wean precedex to off     Psychiatric   Restlessness and agitation    wean off precedex  Appears to get very agitated around 6-7 pm  Quetiapine at 1600 daily  Continue to monitor     Renal/   Hypophosphatemia    Replete daily prn     Oncology   Anemia of chronic disease    Iron supplementation     Other   Endotracheally intubated    Extubate today  abg 30 minutes after  Extubate to aerosol mask           The patient is being Prophylaxed for:  Venous Thromboembolism with: Chemical  Stress Ulcer  with: H2B  Ventilator Pneumonia with: chlorhexidine oral care    Activity Orders          None        Full Code    Gagandeep Amezcua MD  Neurocritical Care  [facility] time 32 minutes  Critical Care was time spent personally by me on the following activities: development of treatment plan with patient or surrogate and bedside caregivers, discussions with consultants, evaluation of patient's response to treatment, examination of patient, ordering and performing treatments and interventions, ordering and review of laboratory studies, ordering and review of radiographic studies, pulse oximetry, re-evaluation of patient's condition. This critical care time did not overlap with that of any other provider or involve time for any procedures.

## 2018-12-01 ENCOUNTER — ANESTHESIA (OUTPATIENT)
Dept: NEUROLOGY | Facility: HOSPITAL | Age: 73
DRG: 100 | End: 2018-12-01
Payer: MEDICARE

## 2018-12-01 ENCOUNTER — ANESTHESIA EVENT (OUTPATIENT)
Dept: NEUROLOGY | Facility: HOSPITAL | Age: 73
DRG: 100 | End: 2018-12-01
Payer: MEDICARE

## 2018-12-01 PROBLEM — R13.10 DYSPHAGIA: Status: ACTIVE | Noted: 2018-12-01

## 2018-12-01 LAB
ALBUMIN SERPL BCP-MCNC: 2.3 G/DL
ALLENS TEST: ABNORMAL
ALLENS TEST: ABNORMAL
ALP SERPL-CCNC: 60 U/L
ALT SERPL W/O P-5'-P-CCNC: 17 U/L
ANION GAP SERPL CALC-SCNC: 9 MMOL/L
AST SERPL-CCNC: 39 U/L
BASOPHILS # BLD AUTO: 0.01 K/UL
BASOPHILS NFR BLD: 0.1 %
BILIRUB SERPL-MCNC: 0.6 MG/DL
BUN SERPL-MCNC: 25 MG/DL
CALCIUM SERPL-MCNC: 8.2 MG/DL
CHLORIDE SERPL-SCNC: 107 MMOL/L
CO2 SERPL-SCNC: 22 MMOL/L
CREAT SERPL-MCNC: 0.7 MG/DL
DELSYS: ABNORMAL
DELSYS: ABNORMAL
DIFFERENTIAL METHOD: ABNORMAL
EOSINOPHIL # BLD AUTO: 0 K/UL
EOSINOPHIL NFR BLD: 0 %
ERYTHROCYTE [DISTWIDTH] IN BLOOD BY AUTOMATED COUNT: 21.2 %
ERYTHROCYTE [SEDIMENTATION RATE] IN BLOOD BY WESTERGREN METHOD: 17 MM/H
ERYTHROCYTE [SEDIMENTATION RATE] IN BLOOD BY WESTERGREN METHOD: 19 MM/H
EST. GFR  (AFRICAN AMERICAN): >60 ML/MIN/1.73 M^2
EST. GFR  (NON AFRICAN AMERICAN): >60 ML/MIN/1.73 M^2
FLOW: 4
FLOW: 4
GLUCOSE SERPL-MCNC: 127 MG/DL
HCO3 UR-SCNC: 24.1 MMOL/L (ref 24–28)
HCO3 UR-SCNC: 26.8 MMOL/L (ref 24–28)
HCT VFR BLD AUTO: 28.2 %
HGB BLD-MCNC: 8.1 G/DL
IMM GRANULOCYTES # BLD AUTO: 0.03 K/UL
IMM GRANULOCYTES NFR BLD AUTO: 0.3 %
LYMPHOCYTES # BLD AUTO: 1 K/UL
LYMPHOCYTES NFR BLD: 11.7 %
MAGNESIUM SERPL-MCNC: 2.1 MG/DL
MCH RBC QN AUTO: 24.3 PG
MCHC RBC AUTO-ENTMCNC: 28.7 G/DL
MCV RBC AUTO: 85 FL
MODE: ABNORMAL
MODE: ABNORMAL
MONOCYTES # BLD AUTO: 0.3 K/UL
MONOCYTES NFR BLD: 3.3 %
NEUTROPHILS # BLD AUTO: 7.5 K/UL
NEUTROPHILS NFR BLD: 84.6 %
NRBC BLD-RTO: 0 /100 WBC
PCO2 BLDA: 16.9 MMHG (ref 35–45)
PCO2 BLDA: 38.2 MMHG (ref 35–45)
PH SMN: 7.45 [PH] (ref 7.35–7.45)
PH SMN: 7.76 [PH] (ref 7.35–7.45)
PHOSPHATE SERPL-MCNC: 3.4 MG/DL
PLATELET # BLD AUTO: 358 K/UL
PMV BLD AUTO: 9.8 FL
PO2 BLDA: 178 MMHG (ref 80–100)
PO2 BLDA: 81 MMHG (ref 80–100)
POC BE: 3 MMOL/L
POC BE: 5 MMOL/L
POC SATURATED O2: 100 % (ref 95–100)
POC SATURATED O2: 96 % (ref 95–100)
POC TCO2: 25 MMOL/L (ref 23–27)
POC TCO2: 28 MMOL/L (ref 23–27)
POTASSIUM SERPL-SCNC: 4.4 MMOL/L
PROT SERPL-MCNC: 7.2 G/DL
RBC # BLD AUTO: 3.33 M/UL
SAMPLE: ABNORMAL
SAMPLE: ABNORMAL
SITE: ABNORMAL
SITE: ABNORMAL
SODIUM SERPL-SCNC: 138 MMOL/L
SP02: 93
SP02: 98
WBC # BLD AUTO: 8.88 K/UL

## 2018-12-01 PROCEDURE — 97161 PT EVAL LOW COMPLEX 20 MIN: CPT

## 2018-12-01 PROCEDURE — 80053 COMPREHEN METABOLIC PANEL: CPT

## 2018-12-01 PROCEDURE — 84484 ASSAY OF TROPONIN QUANT: CPT

## 2018-12-01 PROCEDURE — 99900026 HC AIRWAY MAINTENANCE (STAT)

## 2018-12-01 PROCEDURE — 25000003 PHARM REV CODE 250: Performed by: PSYCHIATRY & NEUROLOGY

## 2018-12-01 PROCEDURE — 63600175 PHARM REV CODE 636 W HCPCS

## 2018-12-01 PROCEDURE — 80053 COMPREHEN METABOLIC PANEL: CPT | Mod: 91

## 2018-12-01 PROCEDURE — 85730 THROMBOPLASTIN TIME PARTIAL: CPT

## 2018-12-01 PROCEDURE — 25000003 PHARM REV CODE 250: Performed by: NURSE PRACTITIONER

## 2018-12-01 PROCEDURE — 63600175 PHARM REV CODE 636 W HCPCS: Performed by: NURSE PRACTITIONER

## 2018-12-01 PROCEDURE — 27200966 HC CLOSED SUCTION SYSTEM

## 2018-12-01 PROCEDURE — 97530 THERAPEUTIC ACTIVITIES: CPT

## 2018-12-01 PROCEDURE — 99233 SBSQ HOSP IP/OBS HIGH 50: CPT | Mod: GC,,, | Performed by: PSYCHIATRY & NEUROLOGY

## 2018-12-01 PROCEDURE — 94761 N-INVAS EAR/PLS OXIMETRY MLT: CPT

## 2018-12-01 PROCEDURE — 83735 ASSAY OF MAGNESIUM: CPT | Mod: 91

## 2018-12-01 PROCEDURE — 86850 RBC ANTIBODY SCREEN: CPT

## 2018-12-01 PROCEDURE — 25000003 PHARM REV CODE 250: Performed by: STUDENT IN AN ORGANIZED HEALTH CARE EDUCATION/TRAINING PROGRAM

## 2018-12-01 PROCEDURE — 20000000 HC ICU ROOM

## 2018-12-01 PROCEDURE — 83605 ASSAY OF LACTIC ACID: CPT

## 2018-12-01 PROCEDURE — 84100 ASSAY OF PHOSPHORUS: CPT

## 2018-12-01 PROCEDURE — 27000221 HC OXYGEN, UP TO 24 HOURS

## 2018-12-01 PROCEDURE — 85025 COMPLETE CBC W/AUTO DIFF WBC: CPT

## 2018-12-01 PROCEDURE — 84100 ASSAY OF PHOSPHORUS: CPT | Mod: 91

## 2018-12-01 PROCEDURE — 82550 ASSAY OF CK (CPK): CPT

## 2018-12-01 PROCEDURE — 0BH17EZ INSERTION OF ENDOTRACHEAL AIRWAY INTO TRACHEA, VIA NATURAL OR ARTIFICIAL OPENING: ICD-10-PCS | Performed by: ANESTHESIOLOGY

## 2018-12-01 PROCEDURE — 83735 ASSAY OF MAGNESIUM: CPT

## 2018-12-01 PROCEDURE — 94002 VENT MGMT INPAT INIT DAY: CPT

## 2018-12-01 PROCEDURE — A4216 STERILE WATER/SALINE, 10 ML: HCPCS | Performed by: NURSE PRACTITIONER

## 2018-12-01 PROCEDURE — 85610 PROTHROMBIN TIME: CPT

## 2018-12-01 PROCEDURE — 25000003 PHARM REV CODE 250

## 2018-12-01 PROCEDURE — 92610 EVALUATE SWALLOWING FUNCTION: CPT

## 2018-12-01 PROCEDURE — 63600175 PHARM REV CODE 636 W HCPCS: Performed by: STUDENT IN AN ORGANIZED HEALTH CARE EDUCATION/TRAINING PROGRAM

## 2018-12-01 RX ORDER — ACETAMINOPHEN 325 MG/1
650 TABLET ORAL EVERY 6 HOURS PRN
Status: DISCONTINUED | OUTPATIENT
Start: 2018-12-01 | End: 2018-12-05

## 2018-12-01 RX ORDER — LANOLIN ALCOHOL/MO/W.PET/CERES
800 CREAM (GRAM) TOPICAL
Status: DISCONTINUED | OUTPATIENT
Start: 2018-12-01 | End: 2018-12-05

## 2018-12-01 RX ORDER — NAPROXEN SODIUM 220 MG/1
81 TABLET, FILM COATED ORAL DAILY
Status: DISCONTINUED | OUTPATIENT
Start: 2018-12-02 | End: 2018-12-04

## 2018-12-01 RX ORDER — DEXMEDETOMIDINE HYDROCHLORIDE 4 UG/ML
INJECTION, SOLUTION INTRAVENOUS
Status: COMPLETED
Start: 2018-12-01 | End: 2018-12-01

## 2018-12-01 RX ORDER — SODIUM,POTASSIUM PHOSPHATES 280-250MG
2 POWDER IN PACKET (EA) ORAL
Status: DISCONTINUED | OUTPATIENT
Start: 2018-12-01 | End: 2018-12-05

## 2018-12-01 RX ORDER — SODIUM BICARBONATE 1 MEQ/ML
SYRINGE (ML) INTRAVENOUS CODE/TRAUMA/SEDATION MEDICATION
Status: COMPLETED | OUTPATIENT
Start: 2018-12-01 | End: 2018-12-01

## 2018-12-01 RX ORDER — POTASSIUM CHLORIDE 20 MEQ/15ML
40 SOLUTION ORAL
Status: DISCONTINUED | OUTPATIENT
Start: 2018-12-01 | End: 2018-12-05

## 2018-12-01 RX ORDER — FENTANYL CITRATE 50 UG/ML
50 INJECTION, SOLUTION INTRAMUSCULAR; INTRAVENOUS
Status: DISCONTINUED | OUTPATIENT
Start: 2018-12-01 | End: 2018-12-06

## 2018-12-01 RX ORDER — QUETIAPINE FUMARATE 25 MG/1
50 TABLET, FILM COATED ORAL NIGHTLY
Status: DISCONTINUED | OUTPATIENT
Start: 2018-12-01 | End: 2018-12-01

## 2018-12-01 RX ORDER — ROCURONIUM BROMIDE 10 MG/ML
INJECTION, SOLUTION INTRAVENOUS
Status: DISCONTINUED
Start: 2018-12-01 | End: 2018-12-01 | Stop reason: WASHOUT

## 2018-12-01 RX ORDER — SUCCINYLCHOLINE CHLORIDE 20 MG/ML
INJECTION INTRAMUSCULAR; INTRAVENOUS
Status: COMPLETED
Start: 2018-12-01 | End: 2018-12-02

## 2018-12-01 RX ORDER — PROPOFOL 10 MG/ML
INJECTION, EMULSION INTRAVENOUS
Status: DISCONTINUED
Start: 2018-12-01 | End: 2018-12-01 | Stop reason: WASHOUT

## 2018-12-01 RX ORDER — PHENYLEPHRINE HCL IN 0.9% NACL 1 MG/10 ML
SYRINGE (ML) INTRAVENOUS
Status: DISPENSED
Start: 2018-12-01 | End: 2018-12-02

## 2018-12-01 RX ORDER — DEXMEDETOMIDINE HYDROCHLORIDE 4 UG/ML
0.2 INJECTION, SOLUTION INTRAVENOUS CONTINUOUS
Status: DISCONTINUED | OUTPATIENT
Start: 2018-12-02 | End: 2018-12-02

## 2018-12-01 RX ORDER — ETOMIDATE 2 MG/ML
INJECTION INTRAVENOUS
Status: COMPLETED
Start: 2018-12-01 | End: 2018-12-02

## 2018-12-01 RX ORDER — QUETIAPINE FUMARATE 25 MG/1
25 TABLET, FILM COATED ORAL EVERY MORNING
Status: DISCONTINUED | OUTPATIENT
Start: 2018-12-02 | End: 2018-12-01

## 2018-12-01 RX ORDER — EPINEPHRINE 0.1 MG/ML
INJECTION INTRAVENOUS CODE/TRAUMA/SEDATION MEDICATION
Status: COMPLETED | OUTPATIENT
Start: 2018-12-01 | End: 2018-12-01

## 2018-12-01 RX ORDER — NAPROXEN SODIUM 220 MG/1
81 TABLET, FILM COATED ORAL DAILY
Status: DISCONTINUED | OUTPATIENT
Start: 2018-12-02 | End: 2018-12-01

## 2018-12-01 RX ORDER — POTASSIUM CHLORIDE 20 MEQ/15ML
20 SOLUTION ORAL
Status: DISCONTINUED | OUTPATIENT
Start: 2018-12-01 | End: 2018-12-05

## 2018-12-01 RX ADMIN — ACETAMINOPHEN 650 MG: 325 TABLET, FILM COATED ORAL at 06:12

## 2018-12-01 RX ADMIN — HEPARIN SODIUM 5000 UNITS: 5000 INJECTION, SOLUTION INTRAVENOUS; SUBCUTANEOUS at 09:12

## 2018-12-01 RX ADMIN — MICONAZOLE NITRATE: 20 POWDER TOPICAL at 09:12

## 2018-12-01 RX ADMIN — FERROUS SULFATE TAB EC 325 MG (65 MG FE EQUIVALENT) 325 MG: 325 (65 FE) TABLET DELAYED RESPONSE at 09:12

## 2018-12-01 RX ADMIN — AMIODARONE HYDROCHLORIDE 150 MG: 1.5 INJECTION, SOLUTION INTRAVENOUS at 11:12

## 2018-12-01 RX ADMIN — HEPARIN SODIUM 5000 UNITS: 5000 INJECTION, SOLUTION INTRAVENOUS; SUBCUTANEOUS at 05:12

## 2018-12-01 RX ADMIN — FERROUS SULFATE TAB EC 325 MG (65 MG FE EQUIVALENT) 325 MG: 325 (65 FE) TABLET DELAYED RESPONSE at 08:12

## 2018-12-01 RX ADMIN — LEVETIRACETAM 750 MG: 750 TABLET ORAL at 08:12

## 2018-12-01 RX ADMIN — MICONAZOLE NITRATE: 20 OINTMENT TOPICAL at 09:12

## 2018-12-01 RX ADMIN — DEXMEDETOMIDINE HYDROCHLORIDE 1 MCG/KG/HR: 4 INJECTION, SOLUTION INTRAVENOUS at 11:12

## 2018-12-01 RX ADMIN — MICONAZOLE NITRATE: 20 POWDER TOPICAL at 08:12

## 2018-12-01 RX ADMIN — HEPARIN SODIUM 5000 UNITS: 5000 INJECTION, SOLUTION INTRAVENOUS; SUBCUTANEOUS at 04:12

## 2018-12-01 RX ADMIN — LEVOTHYROXINE SODIUM 100 MCG: 100 TABLET ORAL at 05:12

## 2018-12-01 RX ADMIN — AMIODARONE HYDROCHLORIDE 1 MG/MIN: 1.8 INJECTION, SOLUTION INTRAVENOUS at 11:12

## 2018-12-01 RX ADMIN — QUETIAPINE FUMARATE 50 MG: 25 TABLET ORAL at 09:12

## 2018-12-01 RX ADMIN — EPINEPHRINE 1 MG: 0.1 INJECTION, SOLUTION ENDOTRACHEAL; INTRACARDIAC; INTRAVENOUS at 10:12

## 2018-12-01 RX ADMIN — DEXAMETHASONE SODIUM PHOSPHATE 6 MG: 4 INJECTION, SOLUTION INTRAMUSCULAR; INTRAVENOUS at 05:12

## 2018-12-01 RX ADMIN — Medication 3 ML: at 04:12

## 2018-12-01 RX ADMIN — DEXMEDETOMIDINE HYDROCHLORIDE 1 MCG/KG/HR: 100 INJECTION, SOLUTION, CONCENTRATE INTRAVENOUS at 11:12

## 2018-12-01 RX ADMIN — Medication 3 ML: at 10:12

## 2018-12-01 RX ADMIN — SODIUM BICARBONATE 50 MEQ: 84 INJECTION, SOLUTION INTRAVENOUS at 10:12

## 2018-12-01 RX ADMIN — LEVETIRACETAM 750 MG: 750 TABLET ORAL at 09:12

## 2018-12-01 NOTE — PROGRESS NOTES
0800- patient had BM, became very aggitated kicking feet and gagging on ETT. HR increased to 140s; precedex was started at 0.2mcg per order. Patient bathed and able to be redirected, ultimately calming down.     1015- Extubated patient; , RN and RT at bedside. Patient tolerated well, and is now on aerosol facemask at 5L with an O2 of 96%. Will continue to monitor. Wrist restraints removed. OGT also removed.     1200- Daughter called and is informed of patient updates.     1400- NCC at bedside assessing patient. Aerosol facemask removed, patient remains in 90% of O2 saturation.     1415- Supply removed. Patient tolerated well. Will continue to monitor.     1445- Patient placed on 4L nasal canula after desating into 80s.     Partial SADIA was performed at bedside (patient tolerated water and was able to swallow her medications crushed).     Daughter called, patient was able to speak to daughter over phone.     NCC aware of breakdown in between legs. Wound consult placed. Powder and cream have been applied today during changes/bath.

## 2018-12-01 NOTE — ASSESSMENT & PLAN NOTE
MRI c-spine without cord compression, contusion or hematoma  CT c-spine with minimally displaced fractures of the posterior arch of the C1 vertebral body in keeping with type 1 Sidney fracture and Type 2 odontoid fracture  C-collar in place  Per NSGY no plans to surgically intervene at the moment  Strict C spine precautions

## 2018-12-01 NOTE — PLAN OF CARE
Problem: Patient Care Overview  Goal: Plan of Care Review  Outcome: Ongoing (interventions implemented as appropriate)  POC reviewed with pt at 0500. Pt verbalized understanding. precedex gtt continued at 0.2mcg/kg/hr. C collar in place. Questions and concerns addressed. No acute events overnight. Pt progressing toward goals. Will continue to monitor. See flowsheets for full assessment and VS info

## 2018-12-01 NOTE — PLAN OF CARE
Problem: Physical Therapy Goal  Goal: Physical Therapy Goal  Goals to be met by: 10 days ()    Patient will increase functional independence with mobility by performin. Supine to sit with Stand-by Assistance  2. Sit to supine with Stand-by Assistance  3. Sit to stand transfer with Stand-by Assistance  4. Gait  x 20 feet with Moderate Assistance using Rolling Walker.   5. Lower extremity exercise program x30 reps per handout, with supervision to assist with improvement of muscular strength      Outcome: Ongoing (interventions implemented as appropriate)  PT evaluation completed. POC initiated.    Ambar Quintanilla, PT  2018

## 2018-12-01 NOTE — SUBJECTIVE & OBJECTIVE
Interval History:  >4 elements OR status of 3 inpatient conditions    Review of Systems   Constitutional: Positive for fatigue.   HENT: Negative.    Eyes: Negative.    Respiratory: Negative.    Cardiovascular: Negative.    Gastrointestinal: Negative.    Endocrine: Negative.    Genitourinary: Negative.    Musculoskeletal: Positive for back pain and neck pain.   Allergic/Immunologic: Negative.    Neurological: Negative.    Hematological: Negative.    Psychiatric/Behavioral: Positive for confusion.     2 systems OR Unable to obtain a complete ROS due to level of consciousness.  Objective:     Vitals:  Temp: 98.2 °F (36.8 °C)  Pulse: (!) 44  Rhythm: normal sinus rhythm  BP: (!) 98/54  MAP (mmHg): 71  Resp: (!) 25  SpO2: 96 %  O2 Device (Oxygen Therapy): nasal cannula    Temp  Min: 98.2 °F (36.8 °C)  Max: 98.6 °F (37 °C)  Pulse  Min: 44  Max: 104  BP  Min: 79/50  Max: 150/58  MAP (mmHg)  Min: 59  Max: 100  Resp  Min: 14  Max: 28  SpO2  Min: 83 %  Max: 100 %    11/30 0701 - 12/01 0700  In: 492.4 [P.O.:400; I.V.:92.4]  Out: 725 [Urine:725]   Unmeasured Output  Urine Occurrence: 0  Stool Occurrence: 0  Emesis Occurrence: 0  Pad Count: 0       Physical Exam  Constitutional: Well-nourished and -developed. No apparent distress.   Eyes: Conjunctiva clear, anicteric. Lids no lesions.  Head/Ears/Nose/Mouth/Throat/Neck: slightly dry mucous membranes. C collar on place  Cardiovascular: Regular rhythm. ESM  Respiratory: Comfortable respirations. Clear to auscultation.  Gastrointestinal: No hernia. Soft, nondistended, nontender. + bowel sounds.      Neurologic Examination:    -Mental Status: Alert. Oriented to person, place, and time. Speech fluent. Follows commands. Mild dysarthria   -Cranial nerves: Visual fields full. Pupils equal, round, and reactive bilateral. Extraocular movements intact. Facial sensation intact. Facial strength symmetric.   -Motor: 4+/5 and symmetric in arms, legs. Tone normal.   -Sensation:Intact to touch in  arms, legs.  -Coordination: Finger-nose-finger, rapid alternating movements, heel-knee-shin normal.      Medications:  Continuous Scheduled  ferrous sulfate 325 mg BID   heparin (porcine) 5,000 Units Q8H   levETIRAcetam 750 mg BID   levothyroxine 100 mcg Before breakfast   miconazole NITRATE 2 %  BID   [START ON 12/2/2018] QUEtiapine 25 mg QAM   QUEtiapine 50 mg QHS   sodium chloride 0.9% 3 mL Q8H   PRN  acetaminophen 650 mg Q6H PRN   albuterol-ipratropium 3 mL Q6H PRN   magnesium oxide 800 mg PRN   magnesium oxide 800 mg PRN   miconazole nitrate 2%  BID PRN   ondansetron 4 mg Q8H PRN   potassium chloride 10% 40 mEq PRN   potassium chloride 10% 40 mEq PRN   potassium chloride 10% 60 mEq PRN   potassium, sodium phosphates 2 packet PRN   potassium, sodium phosphates 2 packet PRN   potassium, sodium phosphates 2 packet PRN   senna-docusate 8.6-50 mg 1 tablet Daily PRN   sodium chloride 0.9% 3 mL PRN     Today I personally reviewed pertinent medications, lines/drains/airways, imaging, cardiology results, laboratory results, microbiology results, notably:    Diet  Diet Dysphagia Mechanical Soft (IDDSI Level 5) Thin  Diet Dysphagia Mechanical Soft (IDDSI Level 5) Thin

## 2018-12-01 NOTE — PROGRESS NOTES
[facility]  Neurocritical Care  Progress Note    Admit Date: 11/22/2018  Service Date: 12/01/2018  Length of Stay: 9    Subjective:     Chief Complaint: Cervical spine fracture    History of Present Illness: 73 y F transferred from [place] for cervical spine injury and seizure activity. She has pmh of HTN, DM, seizure disorder and had an unwitnessed seizure (presumed) at nursing home today followed by fall. On arrival by EMS she was responding minimally. Did not require CPR. She had one seizure en route to hospital at [place] and received versed. At ER at OSH, patient had another witnessed seizure with tonic clonic activity in extremities and became unresponsive. She was intubated, and given iv fosphenytoin.  CTH was negative. CT c-spine showed odontoid fracture and C1 fracture. She received versed and fosphenytoin and OSH, when she was transferred by [place] [facility], en route she was started on ketamine gtt at 0.5 mcg/kg/min. She was transferred to [facility] for [place] fracture requiring neurosurg intervention and also seizure monitoring and control.      TO note  Patient had recent pneumonia and GI bleed earlier this month and was treated at OSH. She was discharged on 11/20 with levaquin for pneumonia and returned to NH     Hospital Course: 11/22: admit to Neuro ICU for higher level of care. Neurosurgery consulted for C-spine fracture. MRI c-spine ordered. Continous EEG monitoring. Epilepsy consulted. Loaded keppra and maintenance keppra  11/26: seroquel, derm consult, discussion with NSGY regarding surgical plan, daughter updated at bedside  11/27: ECG, increase seroquel, Vtach this morning, then bradycardia, wean off precedex, consult nutrition for TF change, Husks x 3 days, obtain smaller MJcollar  11/28: weaning parameters again, stop vimpat, metoprolol, decrease keppra, wean precedex, anemia workup, bradycardia with hypotension event this morning  11/29: no cuff leak this morning, start  decadron then re-eval later  11/30: extubate today, start SQH, PT OT SLP  12/1: NAEO, Passed swallow eval today     Interval History:  >4 elements OR status of 3 inpatient conditions    Review of Systems   Constitutional: Positive for fatigue.   HENT: Negative.    Eyes: Negative.    Respiratory: Negative.    Cardiovascular: Negative.    Gastrointestinal: Negative.    Endocrine: Negative.    Genitourinary: Negative.    Musculoskeletal: Positive for back pain and neck pain.   Allergic/Immunologic: Negative.    Neurological: Negative.    Hematological: Negative.    Psychiatric/Behavioral: Positive for confusion.     2 systems OR Unable to obtain a complete ROS due to level of consciousness.  Objective:     Vitals:  Temp: 98.2 °F (36.8 °C)  Pulse: (!) 44  Rhythm: normal sinus rhythm  BP: (!) 98/54  MAP (mmHg): 71  Resp: (!) 25  SpO2: 96 %  O2 Device (Oxygen Therapy): nasal cannula    Temp  Min: 98.2 °F (36.8 °C)  Max: 98.6 °F (37 °C)  Pulse  Min: 44  Max: 104  BP  Min: 79/50  Max: 150/58  MAP (mmHg)  Min: 59  Max: 100  Resp  Min: 14  Max: 28  SpO2  Min: 83 %  Max: 100 %    11/30 0701 - 12/01 0700  In: 492.4 [P.O.:400; I.V.:92.4]  Out: 725 [Urine:725]   Unmeasured Output  Urine Occurrence: 0  Stool Occurrence: 0  Emesis Occurrence: 0  Pad Count: 0       Physical Exam  Constitutional: Well-nourished and -developed. No apparent distress.   Eyes: Conjunctiva clear, anicteric. Lids no lesions.  Head/Ears/Nose/Mouth/Throat/Neck: slightly dry mucous membranes. C collar on place  Cardiovascular: Regular rhythm. ESM  Respiratory: Comfortable respirations. Clear to auscultation.  Gastrointestinal: No hernia. Soft, nondistended, nontender. + bowel sounds.      Neurologic Examination:    -Mental Status: Alert. Oriented to person, place, and time. Speech fluent. Follows commands. Mild dysarthria   -Cranial nerves: Visual fields full. Pupils equal, round, and reactive bilateral. Extraocular movements intact. Facial sensation intact.  Facial strength symmetric.   -Motor: 4+/5 and symmetric in arms, legs. Tone normal.   -Sensation:Intact to touch in arms, legs.  -Coordination: Finger-nose-finger, rapid alternating movements, heel-knee-shin normal.      Medications:  Continuous Scheduled  ferrous sulfate 325 mg BID   heparin (porcine) 5,000 Units Q8H   levETIRAcetam 750 mg BID   levothyroxine 100 mcg Before breakfast   miconazole NITRATE 2 %  BID   [START ON 12/2/2018] QUEtiapine 25 mg QAM   QUEtiapine 50 mg QHS   sodium chloride 0.9% 3 mL Q8H   PRN  acetaminophen 650 mg Q6H PRN   albuterol-ipratropium 3 mL Q6H PRN   magnesium oxide 800 mg PRN   magnesium oxide 800 mg PRN   miconazole nitrate 2%  BID PRN   ondansetron 4 mg Q8H PRN   potassium chloride 10% 40 mEq PRN   potassium chloride 10% 40 mEq PRN   potassium chloride 10% 60 mEq PRN   potassium, sodium phosphates 2 packet PRN   potassium, sodium phosphates 2 packet PRN   potassium, sodium phosphates 2 packet PRN   senna-docusate 8.6-50 mg 1 tablet Daily PRN   sodium chloride 0.9% 3 mL PRN     Today I personally reviewed pertinent medications, lines/drains/airways, imaging, cardiology results, laboratory results, microbiology results, notably:    Diet  Diet Dysphagia Mechanical Soft (IDDSI Level 5) Thin  Diet Dysphagia Mechanical Soft (IDDSI Level 5) Thin        Assessment/Plan:     Neuro   * Cervical spine fracture    MRI c-spine without cord compression, contusion or hematoma  CT c-spine with minimally displaced fractures of the posterior arch of the C1 vertebral body in keeping with type 1 Sidney fracture and Type 2 odontoid fracture  C-collar in place  Per NSGY no plans to surgically intervene at the moment  Strict C spine precautions      Seizures    Continue to monitor  Continue current dose of keppra     Psychiatric   Restlessness and agitation    Off precedex  Increase seroquel      GI   Dysphagia    - Passed swallow eval  - Start diet            The patient is being Prophylaxed  for:  Venous Thromboembolism with: Mechanical or Chemical  Stress Ulcer with: Not Applicable   Ventilator Pneumonia with: not applicable    Activity Orders          None        Full Code    Level 3    Laura Weiss MD  Neurocritical Care  [facility]

## 2018-12-01 NOTE — PT/OT/SLP EVAL
"Physical Therapy Evaluation    Patient Name:  Melania Malagon   MRN:  [id number]    Recommendations:     Discharge Recommendations:  nursing facility, basic(with skilled services)   Discharge Equipment Recommendations: none   Barriers to discharge: None    Assessment:     Melania Malagon is a 73 y.o. female admitted with a medical diagnosis of Cervical spine fracture.  She presents with the following impairments/functional limitations:  weakness, impaired functional mobilty, impaired self care skills, impaired endurance, gait instability, decreased lower extremity function, impaired balance, impaired cognition, decreased safety awareness, decreased upper extremity function, impaired cardiopulmonary response to activity.  During today's session, pt able to perform bed mobility and transfers with cues; incr assistance req for attempt at gait with pt ultimately unable to perform. Pt with conflicting reports of prior ambulatory status; lives in NH and initially stated she was ambulatory but later stated she utilized wheelchair mostly. PT intervention appropriate while in acute care setting to sustain and improve current level of functioning with anticipation of return to NH    Rehab Prognosis:  Good; patient would benefit from acute skilled PT services to address these deficits and reach maximum level of function.      Recent Surgery: * No surgery found *      Plan:     During this hospitalization, patient to be seen 3 x/week to address the above listed problems via gait training, therapeutic activities, therapeutic exercises  · Plan of Care Expires:  12/31/18   Plan of Care Reviewed with: patient    Subjective     Communicated with nsg prior to session.  Patient found supine in bed upon PT entry to room, agreeable to evaluation.      Chief Complaint: fatigue with activity  Patient comments/goals: "I used a wheelchair"  Pain/Comfort:  · Pain Rating 1: 0/10    Patients cultural, spiritual, Hoahaoism conflicts given the " current situation:      Living Environment:  Patient History:  · Home environment: lives at NH with staff providing assistance. Reports ambulating short distances and later stated utilized wheelchair primarily.        Objective:     Patient found with: pulse ox (continuous), telemetry, peripheral IV, SCD, PureWick, bed alarm, blood pressure cuff     General Precautions: Standard, fall, seizure   Orthopedic Precautions:spinal precautions   Braces: Cervical collar       Exams:  Cognitive Exam  Patient is oriented to Person and able to report location and time with choices and follows 50% of one-step commands    Fine Motor Coordination    -       Impaired  RLE heel shin mild and LLE heel shin mild   Postural Exam Patient presented with the following abnormalities:    -       Rounded shoulders  -       Forward head  -       Posterior pelvic tilt   Sensation    -       Intact   Skin Integrity/Edema     -       Skin integrity: Visible skin intact and Bruising of L forearm and redness around perianal area   R LE ROM WFL   R LE Strength  4-/5 hip flexion, 4/5 knee ext/flex and ankle DF   L LE ROM WFL   L LE Strength  4-/5 hip flexion, 4/5 knee ext/flex and ankle DF       Functional Mobility  Bed Mobility  Supine to Sit: moderate assistance  For trunk advancement  Sit to Supine: moderate assistance for (B) LE management  Rolling to R: Min A; slowed with cues utilized for hand placement for handrail   Transfers Sit to Stand:  minimum assistance with no AD for 1 trials     Gait Attempted forward ambulation with pt having incr difficulty weightshifting. Performed 4 sideway steps with incr cueing and time req; weight-shifting cue utilized with MAX A needed overall         Balance   Static Sitting contact guard assistance   Dynamic Sitting minimum assistance due to incr posterior trunk lean while MMT   Static Standing minimum assistance   Dynamic Standing minimum assistance       AM-PAC 6 CLICK MOBILITY  Total  Score:12       Therapeutic Activities and Exercises:   PT educated pt on the following  - role of PT  - PT POC (including frequency and duration while in hospital)  - discharge recommendation (return to NH) and equipment needs (none)  - level of assistance currently req (1 person for standing)and safety precautions with nsg staff   All questions and concerns answered and addressed. White board updated with pertinent information. Nsg notified.     PT performed perianal cleaning prior to edge of bed activity secondary to pt having BM. Further perianal cleaning and replacement of Purwick performed at end of session.     Patient left HOB elevated with all lines intact, call button in reach and nsg] notified.    GOALS:   Multidisciplinary Problems     Physical Therapy Goals        Problem: Physical Therapy Goal    Goal Priority Disciplines Outcome Goal Variances Interventions   Physical Therapy Goal     PT, PT/OT Ongoing (interventions implemented as appropriate)     Description:  Goals to be met by: 10 days ()    Patient will increase functional independence with mobility by performin. Supine to sit with Stand-by Assistance  2. Sit to supine with Stand-by Assistance  3. Sit to stand transfer with Stand-by Assistance  4. Gait  x 20 feet with Moderate Assistance using Rolling Walker.   5. Lower extremity exercise program x30 reps per handout, with supervision to assist with improvement of muscular strength                        History:     Past Medical History:   Diagnosis Date    Altered mental status 2018    Leukocytosis 2018    Seborrheic keratoses 2018    V-tach 2018       History reviewed. No pertinent surgical history.    Clinical Decision Making:     History  Co-morbidities and personal factors that may impact the plan of care Examination  Body Structures and Functions, activity limitations and participation restrictions that may impact the plan of care Clinical  Presentation   Decision Making/ Complexity Score   Co-morbidities:   [] Time since onset of injury / illness / exacerbation  [x] Status of current condition  [x]Patient's cognitive status and safety concerns    [x] Multiple Medical Problems (see med hx)  Personal Factors:   [] Patient's age  [] Prior Level of function   [] Patient's home situation (environment and family support)  [] Patient's level of motivation  [x] Expected progression of patient      HISTORY:(criteria)    [] 22979 - no personal factors/history    [] 71790 - has 1-2 personal factor/comorbidity     [x] 58196 - has >3 personal factor/comorbidity     Body Regions:  [] Objective examination findings  [] Head     [x]  Neck  [] Trunk   [] Upper Extremity  [x] Lower Extremity    Body Systems:  [x] For communication ability, affect, cognition, language, and learning style: the assessment of the ability to make needs known, consciousness, orientation (person, place, and time), expected emotional /behavioral responses, and learning preferences (eg, learning barriers, education  needs)  [x] For the neuromuscular system: a general assessment of gross coordinated movement (eg, balance, gait, locomotion, transfers, and transitions) and motor function  (motor control and motor learning)  [] For the musculoskeletal system: the assessment of gross symmetry, gross range of motion, gross strength, height, and weight  [] For the integumentary system: the assessment of pliability(texture), presence of scar formation, skin color, and skin integrity  [] For cardiovascular/pulmonary system: the assessment of heart rate, respiratory rate, blood pressure, and edema     Activity limitations:    [x] Patient's cognitive status and saf ety concerns          [x] Status of current condition      [] Weight bearing restriction  [] Cardiopulmunary Restriction    Participation Restrictions:   [] Goals and goal agreement with the patient     [] Rehab potential (prognosis) and  probable outcome      Examination of Body System: (criteria)    [] 64732 - addressing 1-2 elements    [] 60824 - addressing a total of 3 or more elements     [x] 73966 -  Addressing a total of 4 or more elements         Clinical Presentation: (criteria)  Stable - 18091     On examination of body system using standardized tests and measures patient presents with 3 or more elements from any of the following: body structures and functions, activity limitations, and/or participation restrictions.  Leading to a clinical presentation that is considered stable and/or uncomplicated                              Clinical Decision Making  (Eval Complexity):  Low- 64638     Time Tracking:     PT Received On: 12/01/18  PT Start Time: 1034     PT Stop Time: 1055  PT Total Time (min): 21 min     Billable Minutes: Evaluation 12 and Therapeutic Activity 9      Ambar Quintanilla, PT  12/01/2018

## 2018-12-01 NOTE — PT/OT/SLP EVAL
"Speech Language Pathology Evaluation  Bedside Swallow    Patient Name:  Melania Malagon   MRN:  [id number]  Admitting Diagnosis: Cervical spine fracture    Recommendations:                 General Recommendations:  Dysphagia therapy and Cognitive-linguistic evaluation  Diet recommendations:  Dental Soft, Thin   Aspiration Precautions: 1 bite/sip at a time, Alternating bites/sips, Feed only when awake/alert, HOB to 90 degrees, Meds whole 1 at a time, Monitor for s/s of aspiration, Small bites/sips and Standard aspiration precautions   General Precautions: Standard, aspiration, fall, seizure, NPO  Communication strategies:  provide increased time to answer    History:     Past Medical History:   Diagnosis Date    Altered mental status 11/22/2018    Leukocytosis 11/22/2018    Seborrheic keratoses 11/26/2018    V-tach 11/27/2018       History reviewed. No pertinent surgical history.    Social History:  unknown.    Prior Intubation HX:  Pt was intubated at OSH 11/22/18 and extubated 11/30/18    Modified Barium Swallow: none this admission    Chest X-Rays: 11/30/18:  No significant detrimental interval change compared to 11/29/2018.    Prior diet: unknown    Occupation/hobbies/homemaking: none expressed.    Subjective     Pt was awake and alert in NAD.  "I live in a, what's that place?"  Pt noted w/ difficulty forming verbal responses  Patient goals: none expressed     Pain/Comfort:  · Pain Rating 1: 0/10  · Pain Rating Post-Intervention 1: 0/10    Objective:     Oral Musculature Evaluation  · Oral Musculature: WFL  · Dentition: teeth in poor condition, scattered dentition  · Secretion Management: adequate  · Mucosal Quality: adequate  · Mandibular Strength and Mobility: WFL  · Oral Labial Strength and Mobility: WFL  · Lingual Strength and Mobility: WFL  · Buccal Strength and Mobility: WFL  · Volitional Cough: adequate  · Volitional Swallow: present  · Voice Prior to PO Intake: clear    Bedside Swallow Eval: "   Consistencies Assessed:  · Thin liquids tsp, cup and straw sips  · Puree tsp bites  · Solids clinician fed bite     Oral Phase:   · WFL    Pharyngeal Phase:   · WFL  · no overt clinical signs/symptoms of aspiration  · no overt clinical signs/symptoms of pharyngeal dysphagia    Compensatory Strategies  · None    Treatment: Education was provided to pt re: SLP role, eval results, diet recs, aspiration precautions, s/s of aspiration, risk of aspiration, and SLP POC.  She indicated fair understanding.  Pt;s whiteboard was updated.    Assessment:     Melania Malagon is a 73 y.o. female with an SLP diagnosis of Dysphagia.  She presents with a functional oropharyngeal swallow at this time.  She would benefit from further evaluation of cognitive-communication skills as word-finding difficulty was seen in conversation and ongoing monitoring of diet tolerance.    Goals:   Multidisciplinary Problems     SLP Goals        Problem: SLP Goal    Goal Priority Disciplines Outcome   SLP Goal     SLP Ongoing (interventions implemented as appropriate)   Description:  Speech Language Pathology Goals  Goals expected to be met by 12/8/18    1.  Pt will tolerate Dental soft diet w/ thin liquids w/ no overt s/s of aspiration.  2.  Pt will complete Speech Language Cognitive evaluation to determine the need for additional goals.                          Plan:     · Patient to be seen:  3 x/week   · Plan of Care expires:  12/30/18  · Plan of Care reviewed with:  patient   · SLP Follow-Up:  Yes       Discharge recommendations:  other (see comments)(pending pt progress and pt/ot recs)   Barriers to Discharge:  Decreased Care Giver Support    Time Tracking:     SLP Treatment Date:   12/01/18  Speech Start Time:  0809  Speech Stop Time:  0819     Speech Total Time (min):  10 min    Billable Minutes: Eval Swallow and Oral Function 10    Yue Shields CCC-SLP  12/01/2018

## 2018-12-02 PROBLEM — E03.9 HYPOTHYROIDISM: Status: ACTIVE | Noted: 2018-12-02

## 2018-12-02 PROBLEM — I46.9 CARDIAC ARREST: Status: ACTIVE | Noted: 2018-12-02

## 2018-12-02 LAB
ABO + RH BLD: NORMAL
ALBUMIN SERPL BCP-MCNC: 2.1 G/DL
ALBUMIN SERPL BCP-MCNC: 2.3 G/DL
ALLENS TEST: ABNORMAL
ALP SERPL-CCNC: 57 U/L
ALP SERPL-CCNC: 68 U/L
ALT SERPL W/O P-5'-P-CCNC: 17 U/L
ALT SERPL W/O P-5'-P-CCNC: 19 U/L
ANION GAP SERPL CALC-SCNC: 12 MMOL/L
ANION GAP SERPL CALC-SCNC: 7 MMOL/L
APTT BLDCRRT: 22.5 SEC
ASCENDING AORTA: 2.71 CM
AST SERPL-CCNC: 39 U/L
AST SERPL-CCNC: 43 U/L
AV INDEX (PROSTH): 0.51
AV MEAN GRADIENT: 16.32 MMHG
AV PEAK GRADIENT: 25.6 MMHG
AV VALVE AREA: 1.78 CM2
BASOPHILS # BLD AUTO: 0.01 K/UL
BASOPHILS # BLD AUTO: 0.01 K/UL
BASOPHILS NFR BLD: 0.1 %
BASOPHILS NFR BLD: 0.1 %
BILIRUB SERPL-MCNC: 0.9 MG/DL
BILIRUB SERPL-MCNC: 1 MG/DL
BLD GP AB SCN CELLS X3 SERPL QL: NORMAL
BSA FOR ECHO PROCEDURE: 2.03 M2
BUN SERPL-MCNC: 24 MG/DL
BUN SERPL-MCNC: 25 MG/DL
CALCIUM SERPL-MCNC: 7.8 MG/DL
CALCIUM SERPL-MCNC: 8 MG/DL
CHLORIDE SERPL-SCNC: 107 MMOL/L
CHLORIDE SERPL-SCNC: 109 MMOL/L
CK SERPL-CCNC: 89 U/L
CO2 SERPL-SCNC: 21 MMOL/L
CO2 SERPL-SCNC: 25 MMOL/L
CREAT SERPL-MCNC: 0.8 MG/DL
CREAT SERPL-MCNC: 0.9 MG/DL
CV ECHO LV RWT: 0.46 CM
DELSYS: ABNORMAL
DIFFERENTIAL METHOD: ABNORMAL
DIFFERENTIAL METHOD: ABNORMAL
DOP CALC AO PEAK VEL: 2.53 M/S
DOP CALC AO VTI: 46.63 CM
DOP CALC LVOT AREA: 3.46 CM2
DOP CALC LVOT DIAMETER: 2.1 CM
DOP CALC LVOT STROKE VOLUME: 83.08 CM3
DOP CALCLVOT PEAK VEL VTI: 24 CM
E WAVE DECELERATION TIME: 189.14 MSEC
E/A RATIO: 1.29
E/E' RATIO: 12.5
ECHO LV POSTERIOR WALL: 1.01 CM (ref 0.6–1.1)
EOSINOPHIL # BLD AUTO: 0 K/UL
EOSINOPHIL # BLD AUTO: 0 K/UL
EOSINOPHIL NFR BLD: 0 %
EOSINOPHIL NFR BLD: 0 %
ERYTHROCYTE [DISTWIDTH] IN BLOOD BY AUTOMATED COUNT: 21 %
ERYTHROCYTE [DISTWIDTH] IN BLOOD BY AUTOMATED COUNT: 21 %
ERYTHROCYTE [SEDIMENTATION RATE] IN BLOOD BY WESTERGREN METHOD: 14 MM/H
EST. GFR  (AFRICAN AMERICAN): >60 ML/MIN/1.73 M^2
EST. GFR  (AFRICAN AMERICAN): >60 ML/MIN/1.73 M^2
EST. GFR  (NON AFRICAN AMERICAN): >60 ML/MIN/1.73 M^2
EST. GFR  (NON AFRICAN AMERICAN): >60 ML/MIN/1.73 M^2
FIO2: 100
FIO2: 100
FIO2: 40
FIO2: 40
FRACTIONAL SHORTENING: 28 % (ref 28–44)
GLUCOSE SERPL-MCNC: 123 MG/DL
GLUCOSE SERPL-MCNC: 136 MG/DL
HCO3 UR-SCNC: 21.6 MMOL/L (ref 24–28)
HCO3 UR-SCNC: 24.9 MMOL/L (ref 24–28)
HCO3 UR-SCNC: 26.2 MMOL/L (ref 24–28)
HCO3 UR-SCNC: 27.3 MMOL/L (ref 24–28)
HCT VFR BLD AUTO: 26.3 %
HCT VFR BLD AUTO: 27.2 %
HGB BLD-MCNC: 7.9 G/DL
HGB BLD-MCNC: 8 G/DL
IMM GRANULOCYTES # BLD AUTO: 0.1 K/UL
IMM GRANULOCYTES # BLD AUTO: 0.11 K/UL
IMM GRANULOCYTES NFR BLD AUTO: 0.8 %
IMM GRANULOCYTES NFR BLD AUTO: 0.9 %
INR PPP: 1.3
INTERVENTRICULAR SEPTUM: 1 CM (ref 0.6–1.1)
IVRT: 0.08 MSEC
LA MAJOR: 5.55 CM
LA MINOR: 5.48 CM
LA WIDTH: 4.24 CM
LACTATE SERPL-SCNC: 1.5 MMOL/L
LACTATE SERPL-SCNC: 3.4 MMOL/L
LEFT ATRIUM SIZE: 3.73 CM
LEFT ATRIUM VOLUME INDEX: 36.5 ML/M2
LEFT ATRIUM VOLUME: 74.13 CM3
LEFT INTERNAL DIMENSION IN SYSTOLE: 3.18 CM (ref 2.1–4)
LEFT VENTRICLE DIASTOLIC VOLUME INDEX: 43.98 ML/M2
LEFT VENTRICLE DIASTOLIC VOLUME: 89.27 ML
LEFT VENTRICLE MASS INDEX: 74.1 G/M2
LEFT VENTRICLE SYSTOLIC VOLUME INDEX: 19.9 ML/M2
LEFT VENTRICLE SYSTOLIC VOLUME: 40.45 ML
LEFT VENTRICULAR INTERNAL DIMENSION IN DIASTOLE: 4.43 CM (ref 3.5–6)
LEFT VENTRICULAR MASS: 150.49 G
LV LATERAL E/E' RATIO: 11.36
LV SEPTAL E/E' RATIO: 13.89
LYMPHOCYTES # BLD AUTO: 1 K/UL
LYMPHOCYTES # BLD AUTO: 2.1 K/UL
LYMPHOCYTES NFR BLD: 14.3 %
LYMPHOCYTES NFR BLD: 8.7 %
MAGNESIUM SERPL-MCNC: 1.8 MG/DL
MAGNESIUM SERPL-MCNC: 1.9 MG/DL
MCH RBC QN AUTO: 23.9 PG
MCH RBC QN AUTO: 25.3 PG
MCHC RBC AUTO-ENTMCNC: 29 G/DL
MCHC RBC AUTO-ENTMCNC: 30.4 G/DL
MCV RBC AUTO: 82 FL
MCV RBC AUTO: 83 FL
MIN VOL: 7.7
MIN VOL: 8.4
MODE: ABNORMAL
MONOCYTES # BLD AUTO: 1.2 K/UL
MONOCYTES # BLD AUTO: 1.6 K/UL
MONOCYTES NFR BLD: 10.3 %
MONOCYTES NFR BLD: 11.2 %
MV PEAK A VEL: 0.97 M/S
MV PEAK E VEL: 1.25 M/S
NEUTROPHILS # BLD AUTO: 10.8 K/UL
NEUTROPHILS # BLD AUTO: 9.4 K/UL
NEUTROPHILS NFR BLD: 73.6 %
NEUTROPHILS NFR BLD: 80 %
NRBC BLD-RTO: 0 /100 WBC
NRBC BLD-RTO: 0 /100 WBC
PCO2 BLDA: 29.6 MMHG (ref 35–45)
PCO2 BLDA: 34.2 MMHG (ref 35–45)
PCO2 BLDA: 40.3 MMHG (ref 35–45)
PCO2 BLDA: 42.2 MMHG (ref 35–45)
PEEP: 5
PH SMN: 7.42 [PH] (ref 7.35–7.45)
PH SMN: 7.42 [PH] (ref 7.35–7.45)
PH SMN: 7.47 [PH] (ref 7.35–7.45)
PH SMN: 7.47 [PH] (ref 7.35–7.45)
PHOSPHATE SERPL-MCNC: 2.6 MG/DL
PHOSPHATE SERPL-MCNC: 3.1 MG/DL
PIP: 28
PIP: 31
PISA TR MAX VEL: 2.86 M/S
PLATELET # BLD AUTO: 305 K/UL
PLATELET # BLD AUTO: 329 K/UL
PMV BLD AUTO: 9.3 FL
PMV BLD AUTO: 9.6 FL
PO2 BLDA: 260 MMHG (ref 80–100)
PO2 BLDA: 273 MMHG (ref 80–100)
PO2 BLDA: 86 MMHG (ref 80–100)
PO2 BLDA: 87 MMHG (ref 80–100)
POC BE: -2 MMOL/L
POC BE: 1 MMOL/L
POC BE: 2 MMOL/L
POC BE: 3 MMOL/L
POC SATURATED O2: 100 % (ref 95–100)
POC SATURATED O2: 100 % (ref 95–100)
POC SATURATED O2: 97 % (ref 95–100)
POC SATURATED O2: 97 % (ref 95–100)
POC TCO2: 23 MMOL/L (ref 23–27)
POC TCO2: 26 MMOL/L (ref 23–27)
POC TCO2: 27 MMOL/L (ref 23–27)
POC TCO2: 29 MMOL/L (ref 23–27)
POCT GLUCOSE: 105 MG/DL (ref 70–110)
POCT GLUCOSE: 99 MG/DL (ref 70–110)
POTASSIUM SERPL-SCNC: 3.2 MMOL/L
POTASSIUM SERPL-SCNC: 3.7 MMOL/L
PROT SERPL-MCNC: 6.1 G/DL
PROT SERPL-MCNC: 6.6 G/DL
PROTHROMBIN TIME: 13.2 SEC
PULM VEIN S/D RATIO: 0.97
PV PEAK D VEL: 0.7 M/S
PV PEAK S VEL: 0.68 M/S
RA MAJOR: 4.68 CM
RA WIDTH: 3.8 CM
RBC # BLD AUTO: 3.16 M/UL
RBC # BLD AUTO: 3.3 M/UL
RIGHT VENTRICULAR END-DIASTOLIC DIMENSION: 3.75 CM
RV TISSUE DOPPLER FREE WALL SYSTOLIC VELOCITY 1 (APICAL 4 CHAMBER VIEW): 14.85 M/S
SAMPLE: ABNORMAL
SINUS: 2.7 CM
SITE: ABNORMAL
SODIUM SERPL-SCNC: 140 MMOL/L
SODIUM SERPL-SCNC: 141 MMOL/L
SP02: 100
STJ: 2.56 CM
T4 FREE SERPL-MCNC: 0.82 NG/DL
TDI LATERAL: 0.11
TDI SEPTAL: 0.09
TDI: 0.1
TR MAX PG: 32.72 MMHG
TRICUSPID ANNULAR PLANE SYSTOLIC EXCURSION: 2.2 CM
TROPONIN I SERPL DL<=0.01 NG/ML-MCNC: 0.14 NG/ML
TROPONIN I SERPL DL<=0.01 NG/ML-MCNC: 0.26 NG/ML
VT: 450
WBC # BLD AUTO: 11.68 K/UL
WBC # BLD AUTO: 14.66 K/UL

## 2018-12-02 PROCEDURE — 84439 ASSAY OF FREE THYROXINE: CPT

## 2018-12-02 PROCEDURE — 31500 INSERT EMERGENCY AIRWAY: CPT

## 2018-12-02 PROCEDURE — 93005 ELECTROCARDIOGRAM TRACING: CPT

## 2018-12-02 PROCEDURE — 25000003 PHARM REV CODE 250: Performed by: STUDENT IN AN ORGANIZED HEALTH CARE EDUCATION/TRAINING PROGRAM

## 2018-12-02 PROCEDURE — 83605 ASSAY OF LACTIC ACID: CPT

## 2018-12-02 PROCEDURE — 25000003 PHARM REV CODE 250: Performed by: NURSE PRACTITIONER

## 2018-12-02 PROCEDURE — 27200966 HC CLOSED SUCTION SYSTEM

## 2018-12-02 PROCEDURE — 25000003 PHARM REV CODE 250

## 2018-12-02 PROCEDURE — 63600175 PHARM REV CODE 636 W HCPCS: Performed by: NURSE PRACTITIONER

## 2018-12-02 PROCEDURE — 27000221 HC OXYGEN, UP TO 24 HOURS

## 2018-12-02 PROCEDURE — 25000003 PHARM REV CODE 250: Performed by: PSYCHIATRY & NEUROLOGY

## 2018-12-02 PROCEDURE — A4216 STERILE WATER/SALINE, 10 ML: HCPCS | Performed by: NURSE PRACTITIONER

## 2018-12-02 PROCEDURE — 63600175 PHARM REV CODE 636 W HCPCS

## 2018-12-02 PROCEDURE — C1751 CATH, INF, PER/CENT/MIDLINE: HCPCS

## 2018-12-02 PROCEDURE — 82803 BLOOD GASES ANY COMBINATION: CPT

## 2018-12-02 PROCEDURE — 85025 COMPLETE CBC W/AUTO DIFF WBC: CPT

## 2018-12-02 PROCEDURE — 36620 INSERTION CATHETER ARTERY: CPT

## 2018-12-02 PROCEDURE — 99900026 HC AIRWAY MAINTENANCE (STAT)

## 2018-12-02 PROCEDURE — 94761 N-INVAS EAR/PLS OXIMETRY MLT: CPT

## 2018-12-02 PROCEDURE — 99900035 HC TECH TIME PER 15 MIN (STAT)

## 2018-12-02 PROCEDURE — 83735 ASSAY OF MAGNESIUM: CPT

## 2018-12-02 PROCEDURE — 27200188 HC TRANSDUCER, ART ADULT/PEDS

## 2018-12-02 PROCEDURE — 99222 1ST HOSP IP/OBS MODERATE 55: CPT | Mod: 25,GC,, | Performed by: INTERNAL MEDICINE

## 2018-12-02 PROCEDURE — 37799 UNLISTED PX VASCULAR SURGERY: CPT

## 2018-12-02 PROCEDURE — 93010 ELECTROCARDIOGRAM REPORT: CPT | Mod: ,,, | Performed by: INTERNAL MEDICINE

## 2018-12-02 PROCEDURE — 20000000 HC ICU ROOM

## 2018-12-02 PROCEDURE — 80053 COMPREHEN METABOLIC PANEL: CPT

## 2018-12-02 PROCEDURE — 99291 CRITICAL CARE FIRST HOUR: CPT | Mod: GC,,, | Performed by: PSYCHIATRY & NEUROLOGY

## 2018-12-02 PROCEDURE — 94003 VENT MGMT INPAT SUBQ DAY: CPT

## 2018-12-02 PROCEDURE — 25000003 PHARM REV CODE 250: Performed by: PHYSICIAN ASSISTANT

## 2018-12-02 PROCEDURE — 43752 NASAL/OROGASTRIC W/TUBE PLMT: CPT

## 2018-12-02 PROCEDURE — 95951 PR EEG MONITORING/VIDEORECORD: CPT | Mod: 26,,, | Performed by: PSYCHIATRY & NEUROLOGY

## 2018-12-02 PROCEDURE — 84100 ASSAY OF PHOSPHORUS: CPT

## 2018-12-02 PROCEDURE — 36620 INSERTION CATHETER ARTERY: CPT | Mod: ,,, | Performed by: PHYSICIAN ASSISTANT

## 2018-12-02 PROCEDURE — 84484 ASSAY OF TROPONIN QUANT: CPT

## 2018-12-02 RX ORDER — LORAZEPAM 2 MG/ML
2 INJECTION INTRAMUSCULAR ONCE
Status: DISCONTINUED | OUTPATIENT
Start: 2018-12-02 | End: 2018-12-11 | Stop reason: HOSPADM

## 2018-12-02 RX ORDER — CHLORHEXIDINE GLUCONATE ORAL RINSE 1.2 MG/ML
15 SOLUTION DENTAL 2 TIMES DAILY
Status: DISCONTINUED | OUTPATIENT
Start: 2018-12-02 | End: 2018-12-05

## 2018-12-02 RX ORDER — FENTANYL CITRATE-0.9 % NACL/PF 10 MCG/ML
PLASTIC BAG, INJECTION (ML) INTRAVENOUS CONTINUOUS
Status: DISCONTINUED | OUTPATIENT
Start: 2018-12-02 | End: 2018-12-02

## 2018-12-02 RX ORDER — LORAZEPAM 2 MG/ML
INJECTION INTRAMUSCULAR
Status: COMPLETED
Start: 2018-12-02 | End: 2018-12-02

## 2018-12-02 RX ORDER — GLUCAGON 1 MG
1 KIT INJECTION
Status: DISCONTINUED | OUTPATIENT
Start: 2018-12-02 | End: 2018-12-11 | Stop reason: HOSPADM

## 2018-12-02 RX ORDER — PHENYLEPHRINE HYDROCHLORIDE 10 MG/ML
INJECTION INTRAVENOUS
Status: COMPLETED
Start: 2018-12-02 | End: 2018-12-02

## 2018-12-02 RX ORDER — SODIUM CHLORIDE 9 MG/ML
INJECTION, SOLUTION INTRAVENOUS CONTINUOUS
Status: DISCONTINUED | OUTPATIENT
Start: 2018-12-02 | End: 2018-12-05

## 2018-12-02 RX ORDER — LORAZEPAM 2 MG/ML
2 INJECTION INTRAMUSCULAR ONCE
Status: COMPLETED | OUTPATIENT
Start: 2018-12-02 | End: 2018-12-02

## 2018-12-02 RX ORDER — AMOXICILLIN 250 MG
2 CAPSULE ORAL 2 TIMES DAILY
Status: DISCONTINUED | OUTPATIENT
Start: 2018-12-02 | End: 2018-12-04

## 2018-12-02 RX ORDER — FAMOTIDINE 20 MG/1
20 TABLET, FILM COATED ORAL 2 TIMES DAILY
Status: DISCONTINUED | OUTPATIENT
Start: 2018-12-02 | End: 2018-12-04

## 2018-12-02 RX ORDER — INSULIN ASPART 100 [IU]/ML
1-10 INJECTION, SOLUTION INTRAVENOUS; SUBCUTANEOUS EVERY 6 HOURS PRN
Status: DISCONTINUED | OUTPATIENT
Start: 2018-12-02 | End: 2018-12-11 | Stop reason: HOSPADM

## 2018-12-02 RX ADMIN — PHENYLEPHRINE HYDROCHLORIDE: 10 INJECTION INTRAVENOUS at 01:12

## 2018-12-02 RX ADMIN — Medication 3 ML: at 03:12

## 2018-12-02 RX ADMIN — FERROUS SULFATE TAB EC 325 MG (65 MG FE EQUIVALENT) 325 MG: 325 (65 FE) TABLET DELAYED RESPONSE at 09:12

## 2018-12-02 RX ADMIN — SUCCINYLCHOLINE CHLORIDE 20 MG: 20 INJECTION, SOLUTION INTRAMUSCULAR; INTRAVENOUS at 12:12

## 2018-12-02 RX ADMIN — FERROUS SULFATE TAB EC 325 MG (65 MG FE EQUIVALENT) 325 MG: 325 (65 FE) TABLET DELAYED RESPONSE at 10:12

## 2018-12-02 RX ADMIN — FENTANYL CITRATE 50 MCG: 50 INJECTION INTRAMUSCULAR; INTRAVENOUS at 12:12

## 2018-12-02 RX ADMIN — POTASSIUM CHLORIDE 40 MEQ: 20 SOLUTION ORAL at 01:12

## 2018-12-02 RX ADMIN — Medication 3 ML: at 10:12

## 2018-12-02 RX ADMIN — LORAZEPAM 2 MG: 2 INJECTION INTRAMUSCULAR; INTRAVENOUS at 11:12

## 2018-12-02 RX ADMIN — ETOMIDATE 40 MG: 2 INJECTION INTRAVENOUS at 12:12

## 2018-12-02 RX ADMIN — FENTANYL CITRATE 50 MCG: 50 INJECTION INTRAMUSCULAR; INTRAVENOUS at 11:12

## 2018-12-02 RX ADMIN — HEPARIN SODIUM 5000 UNITS: 5000 INJECTION, SOLUTION INTRAVENOUS; SUBCUTANEOUS at 03:12

## 2018-12-02 RX ADMIN — ASPIRIN 81 MG CHEWABLE TABLET 81 MG: 81 TABLET CHEWABLE at 10:12

## 2018-12-02 RX ADMIN — Medication 3 ML: at 06:12

## 2018-12-02 RX ADMIN — POTASSIUM & SODIUM PHOSPHATES POWDER PACK 280-160-250 MG 2 PACKET: 280-160-250 PACK at 03:12

## 2018-12-02 RX ADMIN — LEVETIRACETAM 750 MG: 750 TABLET ORAL at 10:12

## 2018-12-02 RX ADMIN — AMIODARONE HYDROCHLORIDE 0.5 MG/MIN: 1.8 INJECTION, SOLUTION INTRAVENOUS at 10:12

## 2018-12-02 RX ADMIN — HEPARIN SODIUM 5000 UNITS: 5000 INJECTION, SOLUTION INTRAVENOUS; SUBCUTANEOUS at 05:12

## 2018-12-02 RX ADMIN — SENNOSIDES AND DOCUSATE SODIUM 2 TABLET: 8.6; 5 TABLET ORAL at 09:12

## 2018-12-02 RX ADMIN — SENNOSIDES AND DOCUSATE SODIUM 2 TABLET: 8.6; 5 TABLET ORAL at 10:12

## 2018-12-02 RX ADMIN — HEPARIN SODIUM 5000 UNITS: 5000 INJECTION, SOLUTION INTRAVENOUS; SUBCUTANEOUS at 09:12

## 2018-12-02 RX ADMIN — SENNOSIDES AND DOCUSATE SODIUM 1 TABLET: 8.6; 5 TABLET ORAL at 10:12

## 2018-12-02 RX ADMIN — SODIUM CHLORIDE 1000 ML: 0.9 INJECTION, SOLUTION INTRAVENOUS at 12:12

## 2018-12-02 RX ADMIN — FAMOTIDINE 20 MG: 20 TABLET ORAL at 09:12

## 2018-12-02 RX ADMIN — MICONAZOLE NITRATE: 20 POWDER TOPICAL at 09:12

## 2018-12-02 RX ADMIN — FENTANYL CITRATE 50 MCG: 50 INJECTION INTRAMUSCULAR; INTRAVENOUS at 05:12

## 2018-12-02 RX ADMIN — POTASSIUM & SODIUM PHOSPHATES POWDER PACK 280-160-250 MG 2 PACKET: 280-160-250 PACK at 06:12

## 2018-12-02 RX ADMIN — POTASSIUM CHLORIDE 40 MEQ: 20 SOLUTION ORAL at 05:12

## 2018-12-02 RX ADMIN — CHLORHEXIDINE GLUCONATE 0.12% ORAL RINSE 15 ML: 1.2 LIQUID ORAL at 09:12

## 2018-12-02 RX ADMIN — DEXTROSE 1250 MG: 50 INJECTION, SOLUTION INTRAVENOUS at 02:12

## 2018-12-02 RX ADMIN — LORAZEPAM 2 MG: 2 INJECTION INTRAMUSCULAR at 11:12

## 2018-12-02 RX ADMIN — AMIODARONE HYDROCHLORIDE 0.5 MG/MIN: 1.8 INJECTION, SOLUTION INTRAVENOUS at 05:12

## 2018-12-02 RX ADMIN — LEVOTHYROXINE SODIUM 100 MCG: 100 TABLET ORAL at 05:12

## 2018-12-02 RX ADMIN — SODIUM CHLORIDE: 0.9 INJECTION, SOLUTION INTRAVENOUS at 12:12

## 2018-12-02 RX ADMIN — ACETAMINOPHEN 650 MG: 325 TABLET, FILM COATED ORAL at 11:12

## 2018-12-02 RX ADMIN — MICONAZOLE NITRATE: 20 POWDER TOPICAL at 10:12

## 2018-12-02 RX ADMIN — LEVETIRACETAM 2000 MG: 750 TABLET ORAL at 09:12

## 2018-12-02 NOTE — PROGRESS NOTES
CRISTY Vazquez at bedside on the phone with MD. MD ordered to start stone drip r/t pts MAPs in low 50s. Stone drip started. VSS. Will continue to monitor

## 2018-12-02 NOTE — PT/OT/SLP PROGRESS
Occupational Therapy      Patient Name:  Melania Malagon   MRN:  [id number]    OT eval orders acknowledged. Patient not seen today secondary to pt code and intubated last night, not appropriate for therapy at this time. OT orders d/c'ed. Will need new eval orders when pt is appropriate for therapy.     Nazanin Reagan OT  12/2/2018

## 2018-12-02 NOTE — SUBJECTIVE & OBJECTIVE
Interval History:  >4 elements OR status of 3 inpatient conditions    Review of Systems   Unable to perform ROS: Intubated     2 systems OR Unable to obtain a complete ROS due to level of consciousness.  Objective:     Vitals:  Temp: 99 °F (37.2 °C)  Pulse: 105  Rhythm: sinus bradycardia  BP: (!) 117/52  MAP (mmHg): 80  CI (L/min/m2): 2.9 L/min/m2  SVI: 49  SVV: 6 %  Resp: 18  SpO2: 100 %  Oxygen Concentration (%): 100  O2 Device (Oxygen Therapy): ventilator  Vent Mode: A/C  Set Rate: 14 bmp  Vt Set: 450 mL  Pressure Support: 0 cmH20  PEEP/CPAP: 5 cmH20  Peak Airway Pressure: 32 cmH2O  Mean Airway Pressure: 10 cmH20  Plateau Pressure: 22 cmH20    Temp  Min: 97.1 °F (36.2 °C)  Max: 99 °F (37.2 °C)  Pulse  Min: 52  Max: 105  BP  Min: 81/48  Max: 170/70  MAP (mmHg)  Min: 64  Max: 101  CI (L/min/m2)  Min: 2.4 L/min/m2  Max: 2.9 L/min/m2  SVI  Min: 44  Max: 49  SVV  Min: 5 %  Max: 8 %  Resp  Min: 14  Max: 29  SpO2  Min: 91 %  Max: 100 %  Oxygen Concentration (%)  Min: 40  Max: 100    12/01 0701 - 12/02 0700  In: 1854.1 [P.O.:350; I.V.:304.1]  Out: 800 [Urine:800]   Unmeasured Output  Urine Occurrence: 1  Stool Occurrence: 0  Emesis Occurrence: 0  Pad Count: 1       Physical Exam    Constitutional: Well-nourished and -developed. Intubated  Eyes: Conjunctiva clear, anicteric. Lids no lesions.  Head/Ears/Nose/Mouth/Throat/Neck: slightly dry mucous membranes. C collar on place  Cardiovascular: Regular rhythm. ESM  Respiratory: Comfortable respirations. Clear to auscultation.  Gastrointestinal: No hernia. Soft, nondistended, nontender. + bowel sounds.      Neurologic Examination:    -Mental Status: Alert.Follows simple commands.   -Cranial nerves: Visual fields full. Pupils equal, round, and reactive bilateral. Extraocular movements intact. Facial sensation intact.   -Motor: 4+/5 and symmetric in arms, legs. Tone normal.   -Sensation:Intact to touch in arms, legs.  -Coordination: Finger-nose-finger  normal.      Medications:  Continuous    sodium chloride 0.9%    amiodarone in dextrose 5% Last Rate: 0.5 mg/min (12/02/18 1100)   fentanyl    Scheduled    aspirin 81 mg Daily   ferrous sulfate 325 mg BID   heparin (porcine) 5,000 Units Q8H   levetiracetam (KEPPRA) IVPB 100ml 1,250 mg Once   levETIRAcetam 2,000 mg BID   levothyroxine 100 mcg Before breakfast   lorazepam 2 mg Once   miconazole NITRATE 2 %  BID   senna-docusate 8.6-50 mg 2 tablet BID   sodium chloride 0.9% 3 mL Q8H   PRN    acetaminophen 650 mg Q6H PRN   albuterol-ipratropium 3 mL Q6H PRN   dextrose 50% 12.5 g PRN   fentaNYL 50 mcg Q2H PRN   glucagon (human recombinant) 1 mg PRN   insulin aspart U-100 1-10 Units Q6H PRN   magnesium oxide 800 mg PRN   magnesium oxide 800 mg PRN   miconazole nitrate 2%  BID PRN   ondansetron 4 mg Q8H PRN   potassium chloride 10% 20 mEq PRN   potassium chloride 10% 40 mEq PRN   potassium chloride 10% 40 mEq PRN   potassium, sodium phosphates 2 packet PRN   potassium, sodium phosphates 2 packet PRN   potassium, sodium phosphates 2 packet PRN   senna-docusate 8.6-50 mg 1 tablet Daily PRN   sodium chloride 0.9% 3 mL PRN     Today I personally reviewed pertinent medications, lines/drains/airways, imaging, cardiology results, laboratory results, microbiology results, notably:    Diet  Diet Dysphagia Mechanical Soft (IDDSI Level 5) Thin  Diet Dysphagia Mechanical Soft (IDDSI Level 5) Thin

## 2018-12-02 NOTE — PROGRESS NOTES
Pt was intubated by Arielle Stoll MD assisted by RT with a 7.0 ETT and 21 cm @ the lips.  Pt is placed on mechanical ventilation with the documented settings. Will continue to monitor.

## 2018-12-02 NOTE — PROGRESS NOTES
[facility]  Neurocritical Care  Progress Note    Admit Date: 11/22/2018  Service Date: 12/02/2018  Length of Stay: 10    Subjective:     Chief Complaint: Cervical spine fracture    History of Present Illness: 73 y F transferred from [place] for cervical spine injury and seizure activity. She has pmh of HTN, DM, seizure disorder and had an unwitnessed seizure (presumed) at nursing home today followed by fall. On arrival by EMS she was responding minimally. Did not require CPR. She had one seizure en route to hospital at [facility] and received versed. At ER at OSH, patient had another witnessed seizure with tonic clonic activity in extremities and became unresponsive. She was intubated, and given iv fosphenytoin.  CTH was negative. CT c-spine showed odontoid fracture and C1 fracture. She received versed and fosphenytoin and OSH, when [place] [facility] she was started on ketamine gtt at 0.5 mcg/kg/min. [place] [facility] [place] requiring neurosurg intervention and also seizure monitoring and control.      TO note  Patient had recent pneumonia and GI bleed earlier this month and was treated at OSH. She was discharged on 11/20 with levaquin for pneumonia and returned to NH     Hospital Course: 11/22: admit to Neuro ICU for higher level of care. Neurosurgery consulted for C-spine fracture. MRI c-spine ordered. Continous EEG monitoring. Epilepsy consulted. Loaded keppra and maintenance keppra  11/26: seroquel, derm consult, discussion with NSGY regarding surgical plan, daughter updated at bedside  11/27: ECG, increase seroquel, Vtach this morning, then bradycardia, wean off precedex, consult nutrition for TF change, Husks x 3 days, obtain smaller MJcollar  11/28: weaning parameters again, stop vimpat, metoprolol, decrease keppra, wean precedex, anemia workup, bradycardia with hypotension event this morning  11/29: no cuff leak this morning, start  decadron then re-eval later  11/30: extubate today, start SQH, PT OT SLP  12/1: NAEO, Passed swallow eval today   12/2 Had a VF cardiac arrest and was intubated last night     Interval History:  >4 elements OR status of 3 inpatient conditions    Review of Systems   Unable to perform ROS: Intubated     2 systems OR Unable to obtain a complete ROS due to level of consciousness.  Objective:     Vitals:  Temp: 99 °F (37.2 °C)  Pulse: 105  Rhythm: sinus bradycardia  BP: (!) 117/52  MAP (mmHg): 80  CI (L/min/m2): 2.9 L/min/m2  SVI: 49  SVV: 6 %  Resp: 18  SpO2: 100 %  Oxygen Concentration (%): 100  O2 Device (Oxygen Therapy): ventilator  Vent Mode: A/C  Set Rate: 14 bmp  Vt Set: 450 mL  Pressure Support: 0 cmH20  PEEP/CPAP: 5 cmH20  Peak Airway Pressure: 32 cmH2O  Mean Airway Pressure: 10 cmH20  Plateau Pressure: 22 cmH20    Temp  Min: 97.1 °F (36.2 °C)  Max: 99 °F (37.2 °C)  Pulse  Min: 52  Max: 105  BP  Min: 81/48  Max: 170/70  MAP (mmHg)  Min: 64  Max: 101  CI (L/min/m2)  Min: 2.4 L/min/m2  Max: 2.9 L/min/m2  SVI  Min: 44  Max: 49  SVV  Min: 5 %  Max: 8 %  Resp  Min: 14  Max: 29  SpO2  Min: 91 %  Max: 100 %  Oxygen Concentration (%)  Min: 40  Max: 100    12/01 0701 - 12/02 0700  In: 1854.1 [P.O.:350; I.V.:304.1]  Out: 800 [Urine:800]   Unmeasured Output  Urine Occurrence: 1  Stool Occurrence: 0  Emesis Occurrence: 0  Pad Count: 1       Physical Exam    Constitutional: Well-nourished and -developed. Intubated  Eyes: Conjunctiva clear, anicteric. Lids no lesions.  Head/Ears/Nose/Mouth/Throat/Neck: slightly dry mucous membranes. C collar on place  Cardiovascular: Regular rhythm. ESM  Respiratory: Comfortable respirations. Clear to auscultation.  Gastrointestinal: No hernia. Soft, nondistended, nontender. + bowel sounds.      Neurologic Examination:    -Mental Status: Alert.Follows simple commands.   -Cranial nerves: Visual fields full. Pupils equal, round, and reactive bilateral. Extraocular movements intact. Facial  sensation intact.   -Motor: 4+/5 and symmetric in arms, legs. Tone normal.   -Sensation:Intact to touch in arms, legs.  -Coordination: Finger-nose-finger normal.      Medications:  Continuous    sodium chloride 0.9%    amiodarone in dextrose 5% Last Rate: 0.5 mg/min (12/02/18 1100)   fentanyl    Scheduled    aspirin 81 mg Daily   ferrous sulfate 325 mg BID   heparin (porcine) 5,000 Units Q8H   levetiracetam (KEPPRA) IVPB 100ml 1,250 mg Once   levETIRAcetam 2,000 mg BID   levothyroxine 100 mcg Before breakfast   lorazepam 2 mg Once   miconazole NITRATE 2 %  BID   senna-docusate 8.6-50 mg 2 tablet BID   sodium chloride 0.9% 3 mL Q8H   PRN    acetaminophen 650 mg Q6H PRN   albuterol-ipratropium 3 mL Q6H PRN   dextrose 50% 12.5 g PRN   fentaNYL 50 mcg Q2H PRN   glucagon (human recombinant) 1 mg PRN   insulin aspart U-100 1-10 Units Q6H PRN   magnesium oxide 800 mg PRN   magnesium oxide 800 mg PRN   miconazole nitrate 2%  BID PRN   ondansetron 4 mg Q8H PRN   potassium chloride 10% 20 mEq PRN   potassium chloride 10% 40 mEq PRN   potassium chloride 10% 40 mEq PRN   potassium, sodium phosphates 2 packet PRN   potassium, sodium phosphates 2 packet PRN   potassium, sodium phosphates 2 packet PRN   senna-docusate 8.6-50 mg 1 tablet Daily PRN   sodium chloride 0.9% 3 mL PRN     Today I personally reviewed pertinent medications, lines/drains/airways, imaging, cardiology results, laboratory results, microbiology results, notably:    Diet  Diet Dysphagia Mechanical Soft (IDDSI Level 5) Thin  Diet Dysphagia Mechanical Soft (IDDSI Level 5) Thin        Assessment/Plan:     Neuro   * Cervical spine fracture    MRI c-spine without cord compression, contusion or hematoma  CT c-spine with minimally displaced fractures of the posterior arch of the C1 vertebral body in keeping with type 1 Sidney fracture and Type 2 odontoid fracture  C-collar in place  Per NSGY no plans to surgically intervene at the moment  Strict C spine precautions       Seizures    Continue to monitor  Continue keppra  Get EEG     Cardiac/Vascular   Cardiac arrest    - Unclear etiology  - Currently HD stable  - Appreciate card recs  - Continue amiodarone      Endocrine   Hypothyroidism    Continue synthroid      Other   Endotracheally intubated    Continue current vent settings and f/u ABG and CXR           The patient is being Prophylaxed for:  Venous Thromboembolism with: Mechanical or Chemical  Stress Ulcer with: H2B  Ventilator Pneumonia with: chlorhexidine oral care    Activity Orders          None        Full Code    Uninterrupted Critical Care/Counseling Time (not including procedures): 50 minutes     Laura Weiss MD  Neurocritical Care  [facility]

## 2018-12-02 NOTE — PLAN OF CARE
Problem: Patient Care Overview  Goal: Plan of Care Review  Outcome: Ongoing (interventions implemented as appropriate)  Plan of care reviewed with patient and family at 1400. Patient's daughter verbalized understanding; no evidence of learning from patient at this time. All questions and concerns addressed today. Patient remains free from injury and falls. No acute events today. Patient is progressing towards goals. Will continue to monitor. See flowsheets for full assessments and vitals throughout shift.    -Patient had witnessed seizure today  -cEEG placed  -Keppra increased  -Patient bathed today  -Cards consulted today for cardiac event overnight  -NS infusion @ 50mL/hr started today  -Patient remains on Amiodarone infusion

## 2018-12-02 NOTE — ASSESSMENT & PLAN NOTE
74 yo female admitted to [facility] with NCSE also found to have type I atlas and type II odontoid fractures.    S/p reintubation s/p VFib arrest.    --Continue care per primary team.  --Rec repeat CT C spine when stable to assess fracture stability.   --Continue C collar at all times    -- Will still plan for outpatient follow up to discuss surgical options.     Discussed w/Dr. Daugherty and Chief Resident.

## 2018-12-02 NOTE — PROGRESS NOTES
Plan of care reviewed with patient and daughter at 1500. Patient verbalized understanding. All questions and concerns addressed today. Patient remains free from injury and falls. No acute events. Patient progressing towards goal. Will continue to monitor. See flowsheet for full assessment and vitals throughout shift.   Transfer orders have been placed. Awaiting room.

## 2018-12-02 NOTE — PROGRESS NOTES
NCC notified about pt's A line reading a pressure of 90/41 with a MAP of 59. Awaiting orders. Will continue to monitor. CRISTY Vazquez at bedside

## 2018-12-02 NOTE — PT/OT/SLP PROGRESS
Speech Language Pathology  Discharge Summary    Melania Malagon  MRN: [id number]    Patient not seen today secondary to Other (Comment)(pt on ventilator). ST to s/o at this time.  Please reconsult Skilled Speech services when pt is medically appropriate.  Thank you.    Yue Shields, ROSENDO-SLP   12/2/2018

## 2018-12-02 NOTE — PROGRESS NOTES
1127 - Patient observed having seizure-like activity. Actively convulsing and shaking in bed eyes looked up and to the left. EEG tech at bedside placing EEG during the event. [facility] called and Dr. Weiss and CRISTY Miranda at bedside.     1132 - Dr. Weiss verbally ordered to administer Lorazepam 2mg IVP once for seizures. Medication administered.

## 2018-12-02 NOTE — ANESTHESIA PROCEDURE NOTES
Intubation    Diagnosis: Respiratory failure  Patient location during procedure: ICU  Procedure start time: 12/1/2018 11:01 PM  Procedure end time: 12/1/2018 11:02 PM  Staffing  Anesthesiologist: Сергей Reid MD  Resident/CRNA: Jerman Guzmán MD  Performed: resident/CRNA   Anesthesiologist was present at the time of the procedure.  Intubation  Indication: airway protection, hypoxemia, respiratory distress, respiratory failure  Pre-oxygenation. Induction: intravenous, mask ventilation: n/a.  Intubation: postinduction, laryngoscopy glidescope, Solange 3.  Endotracheal Tube: oral, 7.0 mm ID, cuffed (inflated to minimal occlusive pressure)  Attempts: 1, Grade I - full view of cords  Complicating Factors: poor neck/head extension (in C collar)  Tube secured at 21 cm at the lips.  Findings post-intubation: bilateral breath sounds  Position Confirmation: auscultation

## 2018-12-02 NOTE — SUBJECTIVE & OBJECTIVE
Interval History: NSGY called approximately 2245 PM s/p VFib arrest. Patient suddenly became unresponsive, underwent 2 rounds of chest compressions. Post ROSC awake and following commands but low O2 sat, intubated by anesthesia. C collar remained in place throughout code/intubation.    Medications:  Continuous Infusions:   [START ON 12/2/2018] amiodarone in dextrose 5%      [START ON 12/2/2018] dexmedetomidine (PRECEDEX) infusion       Scheduled Meds:   amiodarone        [START ON 12/2/2018] amiodarone in dextrose  150 mg Intravenous Once    amiodarone in dextrose        [START ON 12/2/2018] aspirin  81 mg Per OG tube Daily    dexmedetomidine in 0.9 % NaCl        etomidate        ferrous sulfate  325 mg Oral BID    heparin (porcine)  5,000 Units Subcutaneous Q8H    levETIRAcetam  750 mg Per NG tube BID    levothyroxine  100 mcg Per NG tube Before breakfast    miconazole NITRATE 2 %   Topical (Top) BID    phenylephrine HCl in 0.9% NaCl        propofol        rocuronium        sodium chloride 0.9%  3 mL Intravenous Q8H    succinylcholine         PRN Meds:acetaminophen, albuterol-ipratropium, fentaNYL, magnesium oxide, magnesium oxide, miconazole nitrate 2%, ondansetron, potassium chloride 10%, potassium chloride 10%, potassium chloride 10%, potassium, sodium phosphates, potassium, sodium phosphates, potassium, sodium phosphates, senna-docusate 8.6-50 mg, sodium chloride 0.9%     Review of Systems  Objective:     Weight: 84.4 kg (186 lb)  Body mass index is 32.95 kg/m².  Vital Signs (Most Recent):  Temp: 98.8 °F (37.1 °C) (12/01/18 1900)  Pulse: 93 (12/01/18 2200)  Resp: 17 (12/01/18 2200)  BP: 135/61 (12/01/18 2200)  SpO2: 100 % (12/01/18 2200) Vital Signs (24h Range):  Temp:  [98 °F (36.7 °C)-99 °F (37.2 °C)] 98.8 °F (37.1 °C)  Pulse:  [44-93] 93  Resp:  [14-26] 17  SpO2:  [91 %-100 %] 100 %  BP: ()/(49-70) 135/61     Date 12/01/18 0700 - 12/02/18 0659   Shift 1166-1258 2462-6765 6996-2418 30  Hour Total   INTAKE   P.O.  350  350   I.V.(mL/kg)  3(0)  3(0)   Shift Total(mL/kg)  353(4.2)  353(4.2)   OUTPUT   Urine(mL/kg/hr)  700(1)  700   Emesis/NG output  0  0   Other  0  0   Stool  0  0   Blood  0  0   Shift Total(mL/kg)  700(8.3)  700(8.3)   Weight (kg) 84.4 84.4 84.4 84.4                        Neurosurgery Physical Exam   Awake, intubated  C collar in place  FC x4      Significant Labs:  Recent Labs   Lab 11/30/18  0116 12/01/18  0247   * 127*    138   K 4.5 4.4    107   CO2 25 22*   BUN 17 25*   CREATININE 0.8 0.7   CALCIUM 8.4* 8.2*   MG 2.0 2.1     Recent Labs   Lab 11/30/18  0116 12/01/18  0247   WBC 7.63 8.88   HGB 8.3* 8.1*   HCT 28.0* 28.2*   * 358*     No results for input(s): LABPT, INR, APTT in the last 48 hours.  Microbiology Results (last 7 days)     Procedure Component Value Units Date/Time    Blood culture (site 1) [[id number]] Collected:  11/22/18 1712    Order Status:  Completed Specimen:  Blood from Peripheral, Antecubital, Left Updated:  11/28/18 0612     Blood Culture, Routine No growth after 5 days.    Narrative:       Site # 1, aerobic and anaerobic    Blood culture (site 2) [[id number]] Collected:  11/22/18 1724    Order Status:  Completed Specimen:  Blood from Peripheral, Wrist, Right Updated:  11/28/18 0612     Blood Culture, Routine No growth after 5 days.    Narrative:       Site # 2, aerobic only    Culture, Respiratory with Gram Stain [[id number]] Collected:  11/23/18 1132    Order Status:  Completed Specimen:  Respiratory from Endotracheal Aspirate Updated:  11/26/18 1416     Respiratory Culture --     OKSANA ALBICANS  Few  Normal respiratory rizawn also present       Gram Stain (Respiratory) Few WBC's     Gram Stain (Respiratory) No organisms seen    Narrative:       Mini-BAL.    Culture, Respiratory with Gram Stain [[id number]] Collected:  11/22/18 1520    Order Status:  Completed Specimen:  Respiratory from Sputum Updated:  11/26/18 0804      Respiratory Culture Normal respiratory rizwan     Gram Stain (Respiratory) <10 epithelial cells per low power field.     Gram Stain (Respiratory) Few WBC's     Gram Stain (Respiratory) No organisms seen        Recent Lab Results       12/01/18  1030   12/01/18  1013   12/01/18  0247        Immature Granulocytes     0.3     Immature Grans (Abs)     0.03  Comment:  Mild elevation in immature granulocytes is non specific and   can be seen in a variety of conditions including stress response,   acute inflammation, trauma and pregnancy. Correlation with other   laboratory and clinical findings is essential.       Albumin     2.3     Alkaline Phosphatase     60     Allens Test Pass Pass       ALT     17     Anion Gap     9     AST     39     Baso #     0.01     Basophil%     0.1     Total Bilirubin     0.6  Comment:  For infants and newborns, interpretation of results should be based  on gestational age, weight and in agreement with clinical  observations.  Premature Infant recommended reference ranges:  Up to 24 hours.............<8.0 mg/dL  Up to 48 hours............<12.0 mg/dL  3-5 days..................<15.0 mg/dL  6-29 days.................<15.0 mg/dL       Site RR RR       BUN, Bld     25     Calcium     8.2     Chloride     107     CO2     22     Creatinine     0.7     DelSys Nasal Can Nasal Can       Differential Method     Automated     eGFR if      >60.0     eGFR if non      >60.0  Comment:  Calculation used to obtain the estimated glomerular filtration  rate (eGFR) is the CKD-EPI equation.        Eos #     0.0     Eosinophil%     0.0     Flow 4 4       Glucose     127     Gran # (ANC)     7.5     Gran%     84.6     Hematocrit     28.2     Hemoglobin     8.1     Lymph #     1.0     Lymph%     11.7     Magnesium     2.1     MCH     24.3     MCHC     28.7     MCV     85     Mode SPONT SPONT       Mono #     0.3     Mono%     3.3     MPV     9.8     nRBC     0     Phosphorus     3.4      Platelets     358     POC BE 3 5       POC HCO3 26.8 24.1       POC PCO2 38.2 16.9       POC PH 7.454 7.762       POC PO2 81 178       POC SATURATED O2 96 100       POC TCO2 28 25       Potassium     4.4     Total Protein     7.2     Rate 19 17       RBC     3.33     RDW     21.2     Sample ARTERIAL ARTERIAL       Sodium     138     Sp02 98 93       WBC     8.88           Significant Diagnostics:  I have reviewed all pertinent imaging results/findings within the past 24 hours.

## 2018-12-02 NOTE — PROGRESS NOTES
[facility]  Neurosurgery  Progress Note    Subjective:     History of Present Illness: Melania Malagon is a 73 y.o. year old female with PMHx of HTN, DM, seizure disorder who lives in NH and transferred to [facility] with SE and cervical frx for care escalation. Unable to obtain hx due to AMS. Per charting, she had seizure at NH followed by mechanical fall. At OSH, she was intubated for airway protection and loaded with AED. Lab was significant for leucocytosis, lactic acidosis , and elevated ammonia. CT head was negative for acute process. C spine CT shows minimally displaced type 1 samuel frx and type 2 odontoid frx without retropulsion or canal compromise. She is on ASA per charting. NSGY consulted for further evaluation and possible intervention.    Post-Op Info:  * No surgery found *         Interval History: NSGY called approximately 2245 PM s/p VFib arrest. Patient suddenly became unresponsive, underwent 2 rounds of chest compressions. Post ROSC awake and following commands but low O2 sat, intubated by anesthesia. C collar remained in place throughout code/intubation.    Medications:  Continuous Infusions:   [START ON 12/2/2018] amiodarone in dextrose 5%      [START ON 12/2/2018] dexmedetomidine (PRECEDEX) infusion       Scheduled Meds:   amiodarone        [START ON 12/2/2018] amiodarone in dextrose  150 mg Intravenous Once    amiodarone in dextrose        [START ON 12/2/2018] aspirin  81 mg Per OG tube Daily    dexmedetomidine in 0.9 % NaCl        etomidate        ferrous sulfate  325 mg Oral BID    heparin (porcine)  5,000 Units Subcutaneous Q8H    levETIRAcetam  750 mg Per NG tube BID    levothyroxine  100 mcg Per NG tube Before breakfast    miconazole NITRATE 2 %   Topical (Top) BID    phenylephrine HCl in 0.9% NaCl        propofol        rocuronium        sodium chloride 0.9%  3 mL Intravenous Q8H    succinylcholine         PRN Meds:acetaminophen, albuterol-ipratropium,  fentaNYL, magnesium oxide, magnesium oxide, miconazole nitrate 2%, ondansetron, potassium chloride 10%, potassium chloride 10%, potassium chloride 10%, potassium, sodium phosphates, potassium, sodium phosphates, potassium, sodium phosphates, senna-docusate 8.6-50 mg, sodium chloride 0.9%     Review of Systems  Objective:     Weight: 84.4 kg (186 lb)  Body mass index is 32.95 kg/m².  Vital Signs (Most Recent):  Temp: 98.8 °F (37.1 °C) (12/01/18 1900)  Pulse: 93 (12/01/18 2200)  Resp: 17 (12/01/18 2200)  BP: 135/61 (12/01/18 2200)  SpO2: 100 % (12/01/18 2200) Vital Signs (24h Range):  Temp:  [98 °F (36.7 °C)-99 °F (37.2 °C)] 98.8 °F (37.1 °C)  Pulse:  [44-93] 93  Resp:  [14-26] 17  SpO2:  [91 %-100 %] 100 %  BP: ()/(49-70) 135/61     Date 12/01/18 0700 - 12/02/18 0659   Shift 5465-0055 8909-3317 5971-4696 24 Hour Total   INTAKE   P.O.  350  350   I.V.(mL/kg)  3(0)  3(0)   Shift Total(mL/kg)  353(4.2)  353(4.2)   OUTPUT   Urine(mL/kg/hr)  700(1)  700   Emesis/NG output  0  0   Other  0  0   Stool  0  0   Blood  0  0   Shift Total(mL/kg)  700(8.3)  700(8.3)   Weight (kg) 84.4 84.4 84.4 84.4                        Neurosurgery Physical Exam   Awake, intubated  C collar in place  FC x4      Significant Labs:  Recent Labs   Lab 11/30/18 0116 12/01/18  0247   * 127*    138   K 4.5 4.4    107   CO2 25 22*   BUN 17 25*   CREATININE 0.8 0.7   CALCIUM 8.4* 8.2*   MG 2.0 2.1     Recent Labs   Lab 11/30/18  0116 12/01/18  0247   WBC 7.63 8.88   HGB 8.3* 8.1*   HCT 28.0* 28.2*   * 358*     No results for input(s): LABPT, INR, APTT in the last 48 hours.  Microbiology Results (last 7 days)     Procedure Component Value Units Date/Time    Blood culture (site 1) [[id number]] Collected:  11/22/18 1712    Order Status:  Completed Specimen:  Blood from Peripheral, Antecubital, Left Updated:  11/28/18 0612     Blood Culture, Routine No growth after 5 days.    Narrative:       Site # 1, aerobic and anaerobic     Blood culture (site 2) [[id number]] Collected:  11/22/18 1724    Order Status:  Completed Specimen:  Blood from Peripheral, Wrist, Right Updated:  11/28/18 0612     Blood Culture, Routine No growth after 5 days.    Narrative:       Site # 2, aerobic only    Culture, Respiratory with Gram Stain [[id number]] Collected:  11/23/18 1132    Order Status:  Completed Specimen:  Respiratory from Endotracheal Aspirate Updated:  11/26/18 1416     Respiratory Culture --     OKSANA ALBICANS  Few  Normal respiratory rizwan also present       Gram Stain (Respiratory) Few WBC's     Gram Stain (Respiratory) No organisms seen    Narrative:       Mini-BAL.    Culture, Respiratory with Gram Stain [[id number]] Collected:  11/22/18 1520    Order Status:  Completed Specimen:  Respiratory from Sputum Updated:  11/26/18 0804     Respiratory Culture Normal respiratory rizwan     Gram Stain (Respiratory) <10 epithelial cells per low power field.     Gram Stain (Respiratory) Few WBC's     Gram Stain (Respiratory) No organisms seen        Recent Lab Results       12/01/18  1030   12/01/18  1013   12/01/18  0247        Immature Granulocytes     0.3     Immature Grans (Abs)     0.03  Comment:  Mild elevation in immature granulocytes is non specific and   can be seen in a variety of conditions including stress response,   acute inflammation, trauma and pregnancy. Correlation with other   laboratory and clinical findings is essential.       Albumin     2.3     Alkaline Phosphatase     60     Allens Test Pass Pass       ALT     17     Anion Gap     9     AST     39     Baso #     0.01     Basophil%     0.1     Total Bilirubin     0.6  Comment:  For infants and newborns, interpretation of results should be based  on gestational age, weight and in agreement with clinical  observations.  Premature Infant recommended reference ranges:  Up to 24 hours.............<8.0 mg/dL  Up to 48 hours............<12.0 mg/dL  3-5 days..................<15.0  mg/dL  6-29 days.................<15.0 mg/dL       Site RR RR       BUN, Bld     25     Calcium     8.2     Chloride     107     CO2     22     Creatinine     0.7     DelSys Nasal Can Nasal Can       Differential Method     Automated     eGFR if      >60.0     eGFR if non      >60.0  Comment:  Calculation used to obtain the estimated glomerular filtration  rate (eGFR) is the CKD-EPI equation.        Eos #     0.0     Eosinophil%     0.0     Flow 4 4       Glucose     127     Gran # (ANC)     7.5     Gran%     84.6     Hematocrit     28.2     Hemoglobin     8.1     Lymph #     1.0     Lymph%     11.7     Magnesium     2.1     MCH     24.3     MCHC     28.7     MCV     85     Mode SPONT SPONT       Mono #     0.3     Mono%     3.3     MPV     9.8     nRBC     0     Phosphorus     3.4     Platelets     358     POC BE 3 5       POC HCO3 26.8 24.1       POC PCO2 38.2 16.9       POC PH 7.454 7.762       POC PO2 81 178       POC SATURATED O2 96 100       POC TCO2 28 25       Potassium     4.4     Total Protein     7.2     Rate 19 17       RBC     3.33     RDW     21.2     Sample ARTERIAL ARTERIAL       Sodium     138     Sp02 98 93       WBC     8.88           Significant Diagnostics:  I have reviewed all pertinent imaging results/findings within the past 24 hours.    Assessment/Plan:     * Cervical spine fracture    74 yo female admitted to [facility] with NCSE also found to have type I atlas and type II odontoid fractures.    S/p reintubation s/p VFib arrest.    --Continue care per primary team.  --Rec repeat CT C spine when stable to assess fracture stability.   --Continue C collar at all times    -- Will still plan for outpatient follow up to discuss surgical options.     Discussed w/Dr. Daugherty and Chief Resident.           Gudelia Navas MD  Neurosurgery  [facility]    Update 12/2/18 1203:  -Stable exam. [id number].  -Will obtain x-rays of c-spine to confirm positional  stability of c2 fracture, pt remains in rigid cervical orthosis.

## 2018-12-02 NOTE — PLAN OF CARE
Problem: Patient Care Overview  Goal: Plan of Care Review  Outcome: Ongoing (interventions implemented as appropriate)  POC reviewed with pt at 0500. Pt is unable to verbalize understand r/t being intubated. Pt can nod appropriately and follow commands. Patient code was initiated overnight and eICU was called. A line was placed over night. Purwick was placed overnight. Patient was started on a cory gtt and continued at .1 mcg/kg/min to maintain a MAP > 65. Patient was also started on an amio gtt. The infusion order was changed per KACI Powers from 1mg/min to .5mg/min. Amio infusion continued. Soft restraints were placed on BUE and are continued r/t patient attempting to pull out ETT. Precedex d/c'd. Patient remains safe and free from injury or skin breakdown from the restraints. Flotrak started overnight and continued. Pt progressing toward goals. See other notes for updates and more details about night. Will continue to monitor. See flowsheets for full assessment and VS info

## 2018-12-02 NOTE — PROCEDURES
"Melania Malagon is a 73 y.o. female patient.    Temp: 98 °F (36.7 °C) (12/01/18 2300)  Pulse: (!) 52 (12/02/18 0200)  Resp: 14 (12/02/18 0200)  BP: (!) 103/56 (12/02/18 0200)  SpO2: 100 % (12/02/18 0200)  Weight: 84.4 kg (186 lb) (11/23/18 1307)  Height: 5' 3" (160 cm) (11/23/18 1307)       Arterial Line  Date/Time: 12/2/2018 2:50 AM  Performed by: Joan Ochoa PA-C  Authorized by: Joan Ochoa PA-C   Consent Done: Emergent Situation  Preparation: Patient was prepped and draped in the usual sterile fashion.  Indications: multiple ABGs, respiratory failure and hemodynamic monitoring  Location: right brachial  Patient sedated: yes  Analgesia: see MAR for details  Needle gauge: 20  Number of attempts: 2  Post-procedure: dressing applied  Patient tolerance: Patient tolerated the procedure well with no immediate complications          Joan Ochoa  12/2/2018  "

## 2018-12-02 NOTE — PROGRESS NOTES
"Code start at 2238. Prior to code, pt AAO x4, following commands and asking "how to change the TV channel." RN asked if there was anything the patient needed. The patient verbalized that she "did not need anything". Pt became unresponsive with rhythmic tongue movement, then pt went into VFib arrest. Pt had no pulse. Compressions initiated- see code documentation. ROSC achieved. Patient intubated by MD Arielle with no complications. Pt placed on continuous precedex for comfort. Pt following commands post intubation & code. ETT and OGT confirmed on chest xray. Will continue to monitor and treat per POC.  "

## 2018-12-03 LAB
ALBUMIN SERPL BCP-MCNC: 2.4 G/DL
ALLENS TEST: ABNORMAL
ALP SERPL-CCNC: 63 U/L
ALT SERPL W/O P-5'-P-CCNC: 19 U/L
ANION GAP SERPL CALC-SCNC: 7 MMOL/L
AST SERPL-CCNC: 44 U/L
BASOPHILS # BLD AUTO: 0.02 K/UL
BASOPHILS NFR BLD: 0.2 %
BILIRUB SERPL-MCNC: 0.6 MG/DL
BUN SERPL-MCNC: 23 MG/DL
CALCIUM SERPL-MCNC: 8 MG/DL
CHLORIDE SERPL-SCNC: 108 MMOL/L
CO2 SERPL-SCNC: 25 MMOL/L
CREAT SERPL-MCNC: 1.1 MG/DL
DELSYS: ABNORMAL
DELSYS: ABNORMAL
DIFFERENTIAL METHOD: ABNORMAL
EOSINOPHIL # BLD AUTO: 0.2 K/UL
EOSINOPHIL NFR BLD: 1.5 %
ERYTHROCYTE [DISTWIDTH] IN BLOOD BY AUTOMATED COUNT: 21.8 %
ERYTHROCYTE [SEDIMENTATION RATE] IN BLOOD BY WESTERGREN METHOD: 14 MM/H
EST. GFR  (AFRICAN AMERICAN): 58.4 ML/MIN/1.73 M^2
EST. GFR  (NON AFRICAN AMERICAN): 50.6 ML/MIN/1.73 M^2
FIO2: 40
FIO2: 40
GLUCOSE SERPL-MCNC: 106 MG/DL
HCO3 UR-SCNC: 23.2 MMOL/L (ref 24–28)
HCO3 UR-SCNC: 23.7 MMOL/L (ref 24–28)
HCO3 UR-SCNC: 25.1 MMOL/L (ref 24–28)
HCT VFR BLD AUTO: 29.1 %
HGB BLD-MCNC: 8.4 G/DL
IMM GRANULOCYTES # BLD AUTO: 0.03 K/UL
IMM GRANULOCYTES NFR BLD AUTO: 0.3 %
INR PPP: 1.2
LYMPHOCYTES # BLD AUTO: 2.7 K/UL
LYMPHOCYTES NFR BLD: 25.3 %
MAGNESIUM SERPL-MCNC: 1.8 MG/DL
MCH RBC QN AUTO: 23.9 PG
MCHC RBC AUTO-ENTMCNC: 28.9 G/DL
MCV RBC AUTO: 83 FL
MIN VOL: 6.5
MODE: ABNORMAL
MODE: ABNORMAL
MONOCYTES # BLD AUTO: 1.3 K/UL
MONOCYTES NFR BLD: 12.2 %
NEUTROPHILS # BLD AUTO: 6.4 K/UL
NEUTROPHILS NFR BLD: 60.5 %
NRBC BLD-RTO: 0 /100 WBC
PCO2 BLDA: 33.8 MMHG (ref 35–45)
PCO2 BLDA: 34 MMHG (ref 35–45)
PCO2 BLDA: 35.5 MMHG (ref 35–45)
PEEP: 5
PEEP: 5
PH SMN: 7.43 [PH] (ref 7.35–7.45)
PH SMN: 7.44 [PH] (ref 7.35–7.45)
PH SMN: 7.48 [PH] (ref 7.35–7.45)
PHOSPHATE SERPL-MCNC: 2.7 MG/DL
PLATELET # BLD AUTO: 334 K/UL
PMV BLD AUTO: 9.5 FL
PO2 BLDA: 100 MMHG (ref 80–100)
PO2 BLDA: 102 MMHG (ref 80–100)
PO2 BLDA: 80 MMHG (ref 80–100)
POC BE: -1 MMOL/L
POC BE: -1 MMOL/L
POC BE: 2 MMOL/L
POC SATURATED O2: 97 % (ref 95–100)
POC SATURATED O2: 98 % (ref 95–100)
POC SATURATED O2: 98 % (ref 95–100)
POC TCO2: 24 MMOL/L (ref 23–27)
POC TCO2: 25 MMOL/L (ref 23–27)
POC TCO2: 26 MMOL/L (ref 23–27)
POCT GLUCOSE: 104 MG/DL (ref 70–110)
POCT GLUCOSE: 93 MG/DL (ref 70–110)
POCT GLUCOSE: 97 MG/DL (ref 70–110)
POTASSIUM SERPL-SCNC: 3.8 MMOL/L
POTASSIUM SERPL-SCNC: 4 MMOL/L
PROT SERPL-MCNC: 6.6 G/DL
PROTHROMBIN TIME: 12.4 SEC
PS: 10
RBC # BLD AUTO: 3.51 M/UL
SAMPLE: ABNORMAL
SITE: ABNORMAL
SODIUM SERPL-SCNC: 140 MMOL/L
SP02: 100
SPONT RATE: 16
VOL: 416
VT: 450
WBC # BLD AUTO: 10.61 K/UL

## 2018-12-03 PROCEDURE — 25000003 PHARM REV CODE 250: Performed by: STUDENT IN AN ORGANIZED HEALTH CARE EDUCATION/TRAINING PROGRAM

## 2018-12-03 PROCEDURE — 99900035 HC TECH TIME PER 15 MIN (STAT)

## 2018-12-03 PROCEDURE — A4216 STERILE WATER/SALINE, 10 ML: HCPCS | Performed by: NURSE PRACTITIONER

## 2018-12-03 PROCEDURE — 80053 COMPREHEN METABOLIC PANEL: CPT

## 2018-12-03 PROCEDURE — 25000003 PHARM REV CODE 250: Performed by: NURSE PRACTITIONER

## 2018-12-03 PROCEDURE — 95951 HC EEG MONITORING/VIDEO RECORD: CPT

## 2018-12-03 PROCEDURE — 63600175 PHARM REV CODE 636 W HCPCS

## 2018-12-03 PROCEDURE — 94003 VENT MGMT INPAT SUBQ DAY: CPT

## 2018-12-03 PROCEDURE — 99222 1ST HOSP IP/OBS MODERATE 55: CPT | Mod: GC,,, | Performed by: INTERNAL MEDICINE

## 2018-12-03 PROCEDURE — 27000221 HC OXYGEN, UP TO 24 HOURS

## 2018-12-03 PROCEDURE — 63600175 PHARM REV CODE 636 W HCPCS: Performed by: NURSE PRACTITIONER

## 2018-12-03 PROCEDURE — 99291 CRITICAL CARE FIRST HOUR: CPT | Mod: GC,,, | Performed by: PSYCHIATRY & NEUROLOGY

## 2018-12-03 PROCEDURE — 25000003 PHARM REV CODE 250: Performed by: PSYCHIATRY & NEUROLOGY

## 2018-12-03 PROCEDURE — 83735 ASSAY OF MAGNESIUM: CPT

## 2018-12-03 PROCEDURE — 82803 BLOOD GASES ANY COMBINATION: CPT

## 2018-12-03 PROCEDURE — 85610 PROTHROMBIN TIME: CPT

## 2018-12-03 PROCEDURE — 37799 UNLISTED PX VASCULAR SURGERY: CPT

## 2018-12-03 PROCEDURE — 94761 N-INVAS EAR/PLS OXIMETRY MLT: CPT

## 2018-12-03 PROCEDURE — 20000000 HC ICU ROOM

## 2018-12-03 PROCEDURE — 94150 VITAL CAPACITY TEST: CPT

## 2018-12-03 PROCEDURE — 85025 COMPLETE CBC W/AUTO DIFF WBC: CPT

## 2018-12-03 PROCEDURE — 84132 ASSAY OF SERUM POTASSIUM: CPT

## 2018-12-03 PROCEDURE — 84100 ASSAY OF PHOSPHORUS: CPT

## 2018-12-03 PROCEDURE — 95951 PR EEG MONITORING/VIDEORECORD: CPT | Mod: 26,,, | Performed by: PSYCHIATRY & NEUROLOGY

## 2018-12-03 RX ORDER — MIDAZOLAM HYDROCHLORIDE 1 MG/ML
2 INJECTION INTRAMUSCULAR; INTRAVENOUS ONCE
Status: COMPLETED | OUTPATIENT
Start: 2018-12-03 | End: 2018-12-03

## 2018-12-03 RX ORDER — PHENYLEPHRINE HCL IN 0.9% NACL 1 MG/10 ML
SYRINGE (ML) INTRAVENOUS
Status: COMPLETED
Start: 2018-12-03 | End: 2018-12-03

## 2018-12-03 RX ORDER — AMIODARONE HYDROCHLORIDE 200 MG/1
400 TABLET ORAL 2 TIMES DAILY
Status: DISCONTINUED | OUTPATIENT
Start: 2018-12-03 | End: 2018-12-04

## 2018-12-03 RX ORDER — MIDAZOLAM HYDROCHLORIDE 1 MG/ML
INJECTION INTRAMUSCULAR; INTRAVENOUS
Status: COMPLETED
Start: 2018-12-03 | End: 2018-12-03

## 2018-12-03 RX ORDER — FENTANYL CITRATE 50 UG/ML
INJECTION, SOLUTION INTRAMUSCULAR; INTRAVENOUS
Status: DISCONTINUED
Start: 2018-12-03 | End: 2018-12-03 | Stop reason: WASHOUT

## 2018-12-03 RX ADMIN — LEVETIRACETAM 2000 MG: 750 TABLET ORAL at 09:12

## 2018-12-03 RX ADMIN — MAGNESIUM OXIDE TAB 400 MG (241.3 MG ELEMENTAL MG) 800 MG: 400 (241.3 MG) TAB at 03:12

## 2018-12-03 RX ADMIN — Medication 3 ML: at 03:12

## 2018-12-03 RX ADMIN — POTASSIUM & SODIUM PHOSPHATES POWDER PACK 280-160-250 MG 2 PACKET: 280-160-250 PACK at 03:12

## 2018-12-03 RX ADMIN — FENTANYL CITRATE 50 MCG: 50 INJECTION INTRAMUSCULAR; INTRAVENOUS at 11:12

## 2018-12-03 RX ADMIN — MIDAZOLAM HYDROCHLORIDE 2 MG: 1 INJECTION, SOLUTION INTRAMUSCULAR; INTRAVENOUS at 02:12

## 2018-12-03 RX ADMIN — AMIODARONE HYDROCHLORIDE 0.5 MG/MIN: 1.8 INJECTION, SOLUTION INTRAVENOUS at 07:12

## 2018-12-03 RX ADMIN — FAMOTIDINE 20 MG: 20 TABLET ORAL at 09:12

## 2018-12-03 RX ADMIN — AMIODARONE HYDROCHLORIDE 400 MG: 200 TABLET ORAL at 09:12

## 2018-12-03 RX ADMIN — HEPARIN SODIUM 5000 UNITS: 5000 INJECTION, SOLUTION INTRAVENOUS; SUBCUTANEOUS at 10:12

## 2018-12-03 RX ADMIN — Medication 3 ML: at 10:12

## 2018-12-03 RX ADMIN — Medication 3 ML: at 06:12

## 2018-12-03 RX ADMIN — LEVOTHYROXINE SODIUM 100 MCG: 100 TABLET ORAL at 05:12

## 2018-12-03 RX ADMIN — CHLORHEXIDINE GLUCONATE 0.12% ORAL RINSE 15 ML: 1.2 LIQUID ORAL at 09:12

## 2018-12-03 RX ADMIN — MIDAZOLAM HYDROCHLORIDE 2 MG: 1 INJECTION INTRAMUSCULAR; INTRAVENOUS at 02:12

## 2018-12-03 RX ADMIN — MICONAZOLE NITRATE: 20 POWDER TOPICAL at 09:12

## 2018-12-03 RX ADMIN — HEPARIN SODIUM 5000 UNITS: 5000 INJECTION, SOLUTION INTRAVENOUS; SUBCUTANEOUS at 05:12

## 2018-12-03 RX ADMIN — MICONAZOLE NITRATE: 20 OINTMENT TOPICAL at 09:12

## 2018-12-03 RX ADMIN — POTASSIUM CHLORIDE 40 MEQ: 20 SOLUTION ORAL at 11:12

## 2018-12-03 RX ADMIN — FERROUS SULFATE TAB EC 325 MG (65 MG FE EQUIVALENT) 325 MG: 325 (65 FE) TABLET DELAYED RESPONSE at 09:12

## 2018-12-03 RX ADMIN — POTASSIUM & SODIUM PHOSPHATES POWDER PACK 280-160-250 MG 2 PACKET: 280-160-250 PACK at 11:12

## 2018-12-03 RX ADMIN — SENNOSIDES AND DOCUSATE SODIUM 2 TABLET: 8.6; 5 TABLET ORAL at 09:12

## 2018-12-03 RX ADMIN — FENTANYL CITRATE 50 MCG: 50 INJECTION INTRAMUSCULAR; INTRAVENOUS at 04:12

## 2018-12-03 RX ADMIN — ASPIRIN 81 MG CHEWABLE TABLET 81 MG: 81 TABLET CHEWABLE at 09:12

## 2018-12-03 RX ADMIN — ACETAMINOPHEN 650 MG: 325 TABLET, FILM COATED ORAL at 04:12

## 2018-12-03 RX ADMIN — SODIUM CHLORIDE: 0.9 INJECTION, SOLUTION INTRAVENOUS at 07:12

## 2018-12-03 RX ADMIN — AMIODARONE HYDROCHLORIDE 0.5 MG/MIN: 1.8 INJECTION, SOLUTION INTRAVENOUS at 10:12

## 2018-12-03 RX ADMIN — FENTANYL CITRATE 50 MCG: 50 INJECTION INTRAMUSCULAR; INTRAVENOUS at 12:12

## 2018-12-03 RX ADMIN — MAGNESIUM OXIDE TAB 400 MG (241.3 MG ELEMENTAL MG) 800 MG: 400 (241.3 MG) TAB at 11:12

## 2018-12-03 RX ADMIN — HEPARIN SODIUM 5000 UNITS: 5000 INJECTION, SOLUTION INTRAVENOUS; SUBCUTANEOUS at 03:12

## 2018-12-03 NOTE — NURSING
Manometry for Central Line Procedure     Manometry Performed: yes    Manometry performed by: Dr. Amezcua    Right Femoral CVC placed without any complications. Versed 2mg given for procedure. Pt tolerated well. Per Dr. Amezcua, ok to use CVC.

## 2018-12-03 NOTE — CONSULTS
"RD received consult for "patient intubated for 24 hours; no TF."  RD services following pt. Please see Student Dietitian's note for full assessment.    Recommendation/Intervention:   1. As medically able recommend restarting TF.   Glucerna 1.5 @ goal rate 45mL/hr. Provides 1620kcals, 89g protein, 820ml free water.   Hold for residuals >500mL  2. If able to extubate and advance diet recommend diabetic diet with texture per SLP recommendations.     Thanks,  Martha  "

## 2018-12-03 NOTE — PLAN OF CARE
Problem: Patient Care Overview  Goal: Plan of Care Review  Outcome: Ongoing (interventions implemented as appropriate)  Recommendation/Intervention:   1. As medically able recommend restarting TF.   Glucerna 1.5 @ goal rate 45mL/hr. Provides 1620kcals, 89g protein, 820ml free water.   Hold for residuals >500mL  2. If able to extubate and advance diet recommend diabetic diet with texture per SLP recommendations.   RD to monitor   Goals: Pt to receive nutrition by RD follow up  Nutrition Goal Status: goal met

## 2018-12-03 NOTE — SUBJECTIVE & OBJECTIVE
Medications:  Continuous Infusions:   sodium chloride 0.9% 50 mL/hr at 12/02/18 1800    amiodarone in dextrose 5% 0.5 mg/min (12/02/18 1800)     Scheduled Meds:   aspirin  81 mg Per OG tube Daily    chlorhexidine  15 mL Mouth/Throat BID    famotidine  20 mg Per OG tube BID    ferrous sulfate  325 mg Oral BID    heparin (porcine)  5,000 Units Subcutaneous Q8H    levETIRAcetam  2,000 mg Per NG tube BID    levothyroxine  100 mcg Per NG tube Before breakfast    lorazepam  2 mg Intravenous Once    miconazole NITRATE 2 %   Topical (Top) BID    senna-docusate 8.6-50 mg  2 tablet Per NG tube BID    sodium chloride 0.9%  3 mL Intravenous Q8H     PRN Meds:acetaminophen, albuterol-ipratropium, dextrose 50%, fentaNYL, glucagon (human recombinant), insulin aspart U-100, magnesium oxide, magnesium oxide, miconazole nitrate 2%, ondansetron, potassium chloride 10%, potassium chloride 10%, potassium chloride 10%, potassium, sodium phosphates, potassium, sodium phosphates, potassium, sodium phosphates, sodium chloride 0.9%     Review of Systems    Objective:     Weight: 92.5 kg (204 lb)  Body mass index is 36.14 kg/m².  Vital Signs (Most Recent):  Temp: 98.8 °F (37.1 °C) (12/02/18 1500)  Pulse: 82 (12/02/18 1800)  Resp: 15 (12/02/18 1800)  BP: (!) 117/52 (12/02/18 1800)  SpO2: 99 % (12/02/18 1800) Vital Signs (24h Range):  Temp:  [97.1 °F (36.2 °C)-99 °F (37.2 °C)] 98.8 °F (37.1 °C)  Pulse:  [] 82  Resp:  [14-29] 15  SpO2:  [99 %-100 %] 99 %  BP: ()/(48-70) 117/52  Arterial Line BP: (100-155)/(43-64) 143/56     Date 12/02/18 0700 - 12/03/18 0659   Shift 2792-8768 0862-0614 2470-8629 24 Hour Total   INTAKE   I.V.(mL/kg) 83.5(0.9) 413.5(4.5)  497(5.4)   IV Piggyback 100   100   Shift Total(mL/kg) 183.5(2) 413.5(4.5)  597(6.5)   OUTPUT   Shift Total(mL/kg)       Weight (kg) 92.9 92.5 92.5 92.5              Vent Mode: A/C  Oxygen Concentration (%):  [] 40  Resp Rate Total:  [14 br/min-62 br/min] 15  br/min  Vt Set:  [450 mL] 450 mL  PEEP/CPAP:  [5 cmH20] 5 cmH20  Pressure Support:  [0 cmH20] 0 cmH20  Mean Airway Pressure:  [9.9 afH33-63 cmH20] 9.9 cmH20         Neurosurgery Physical Exam     Awake, intubated  C collar in place  FC x4      Significant Labs:  Recent Labs   Lab 12/01/18  0247 12/01/18  2347 12/02/18  0145   * 136* 123*    140 141   K 4.4 3.2* 3.7    107 109   CO2 22* 21* 25   BUN 25* 24* 25*   CREATININE 0.7 0.9 0.8   CALCIUM 8.2* 8.0* 7.8*   MG 2.1 1.9 1.8     Recent Labs   Lab 12/01/18  0247 12/02/18  0033 12/02/18  0145   WBC 8.88 11.68 14.66*   HGB 8.1* 8.0* 7.9*   HCT 28.2* 26.3* 27.2*   * 305 329     Recent Labs   Lab 12/01/18  2347   INR 1.3*   APTT 22.5     Microbiology Results (last 7 days)     Procedure Component Value Units Date/Time    Blood culture (site 1) [[id number]] Collected:  11/22/18 1712    Order Status:  Completed Specimen:  Blood from Peripheral, Antecubital, Left Updated:  11/28/18 0612     Blood Culture, Routine No growth after 5 days.    Narrative:       Site # 1, aerobic and anaerobic    Blood culture (site 2) [[id number]] Collected:  11/22/18 1724    Order Status:  Completed Specimen:  Blood from Peripheral, Wrist, Right Updated:  11/28/18 0612     Blood Culture, Routine No growth after 5 days.    Narrative:       Site # 2, aerobic only    Culture, Respiratory with Gram Stain [[id number]] Collected:  11/23/18 1132    Order Status:  Completed Specimen:  Respiratory from Endotracheal Aspirate Updated:  11/26/18 1416     Respiratory Culture --     OKSANA ALBICANS  Few  Normal respiratory rizwan also present       Gram Stain (Respiratory) Few WBC's     Gram Stain (Respiratory) No organisms seen    Narrative:       Mini-BAL.    Culture, Respiratory with Gram Stain [[id number]] Collected:  11/22/18 1520    Order Status:  Completed Specimen:  Respiratory from Sputum Updated:  11/26/18 0804     Respiratory Culture Normal respiratory rizwan     Gram Stain  (Respiratory) <10 epithelial cells per low power field.     Gram Stain (Respiratory) Few WBC's     Gram Stain (Respiratory) No organisms seen        Recent Lab Results       12/02/18  1705   12/02/18  1014   12/02/18  0952   12/02/18  0905   12/02/18  0145        Immature Granulocytes         0.8     Immature Grans (Abs)         0.11  Comment:  Mild elevation in immature granulocytes is non specific and   can be seen in a variety of conditions including stress response,   acute inflammation, trauma and pregnancy. Correlation with other   laboratory and clinical findings is essential.       Albumin         2.1     Alkaline Phosphatase         57     Allens Test N/A   N/A         ALT         17     Anion Gap         7     aPTT               AST         39     Baso #         0.01     Basophil%         0.1     Total Bilirubin         1.0  Comment:  For infants and newborns, interpretation of results should be based  on gestational age, weight and in agreement with clinical  observations.  Premature Infant recommended reference ranges:  Up to 24 hours.............<8.0 mg/dL  Up to 48 hours............<12.0 mg/dL  3-5 days..................<15.0 mg/dL  6-29 days.................<15.0 mg/dL       Site [facility]   [facility]         BUN, Bld         25     Calcium         7.8     Chloride         109     CO2         25     CPK               Creatinine         0.8     DelSys Adult Vent   Adult Vent         Differential Method         Automated     eGFR if          >60.0     eGFR if non          >60.0  Comment:  Calculation used to obtain the estimated glomerular filtration  rate (eGFR) is the CKD-EPI equation.        Eos #         0.0     Eosinophil%         0.0     FiO2 100   40         Free T4         0.82     Glucose         123     Gran # (ANC)         10.8     Gran%         73.6     Group & Rh               Hematocrit         27.2     Hemoglobin         7.9     Coumadin Monitoring INR                Lactate, Oliver       1.5  Comment:  Falsely low lactic acid results can be found in samples   containing >=13.0 mg/dL total bilirubin and/or >=3.5 mg/dL   direct bilirubin.         Lymph #         2.1     Lymph%         14.3     Magnesium         1.8     MCH         23.9     MCHC         29.0     MCV         82     Min Vol 8.4   7.7         Mode AC/PRVC   AC/PRVC         Mono #         1.6     Mono%         11.2     MPV         9.3     nRBC         0     PEEP 5   5         Phosphorus         2.6     PiP 28   31         Platelets         329     POC BE 2   -2         POC HCO3 26.2   21.6         POC PCO2 40.3   29.6         POC PH 7.421   7.471         POC PO2 273   86         POC SATURATED O2 100   97         POC TCO2 27   23         POCT Glucose   99           Potassium         3.7     Total Protein         6.1     Protime               Rate 14   14         RBC         3.30     RDW         21.0     Sample ARTERIAL   ARTERIAL         Sodium         141     Sp02 100   100         Troponin I       0.261  Comment:  The reference interval for Troponin I represents the 99th percentile   cutoff   for our facility and is consistent with 3rd generation assay   performance.         Vt 450   450         WBC         14.66                      12/02/18  0036   12/02/18  0033   12/01/18  2347        Immature Granulocytes   0.9       Immature Grans (Abs)   0.10  Comment:  Mild elevation in immature granulocytes is non specific and   can be seen in a variety of conditions including stress response,   acute inflammation, trauma and pregnancy. Correlation with other   laboratory and clinical findings is essential.         Albumin     2.3     Alkaline Phosphatase     68     Allens Test N/A         ALT     19     Anion Gap     12     aPTT     22.5  Comment:  aPTT therapeutic range = 39-69 seconds     AST     43     Baso #   0.01       Basophil%   0.1       Total Bilirubin     0.9  Comment:  For infants and newborns,  interpretation of results should be based  on gestational age, weight and in agreement with clinical  observations.  Premature Infant recommended reference ranges:  Up to 24 hours.............<8.0 mg/dL  Up to 48 hours............<12.0 mg/dL  3-5 days..................<15.0 mg/dL  6-29 days.................<15.0 mg/dL       Site [place]         BUN, Bld     24     Calcium     8.0     Chloride     107     CO2     21     CPK     89     Creatinine     0.9     DelRFEyeDs Adult Vent         Differential Method   Automated       eGFR if      >60.0     eGFR if non      >60.0  Comment:  Calculation used to obtain the estimated glomerular filtration  rate (eGFR) is the CKD-EPI equation.        Eos #   0.0       Eosinophil%   0.0       FiO2 100         Free T4           Glucose     136     Gran # (ANC)   9.4       Gran%   80.0       Group & Rh     O NEG     Hematocrit   26.3       Hemoglobin   8.0       Coumadin Monitoring INR     1.3  Comment:  Coumadin Therapy:  2.0 - 3.0 for INR for all indicators except mechanical heart valves  and antiphospholipid syndromes which should use 2.5 - 3.5.       Lactate, Oliver     3.4  Comment:  Falsely low lactic acid results can be found in samples   containing >=13.0 mg/dL total bilirubin and/or >=3.5 mg/dL   direct bilirubin.       Lymph #   1.0       Lymph%   8.7       Magnesium     1.9     MCH   25.3       MCHC   30.4       MCV   83       Min Vol           Mode AC/PRVC         Mono #   1.2       Mono%   10.3       MPV   9.6       nRBC   0       PEEP 5         Phosphorus     3.1     PiP           Platelets   305       POC BE 3         POC HCO3 27.3         POC PCO2 42.2         POC PH 7.419         POC PO2 260         POC SATURATED O2 100         POC TCO2 29         POCT Glucose           Potassium     3.2     Total Protein     6.6     Protime     13.2     Rate 14         RBC   3.16       RDW   21.0       Sample ARTERIAL         Sodium     140     Sp02 100          Troponin I     0.144  Comment:  The reference interval for Troponin I represents the 99th percentile   cutoff   for our facility and is consistent with 3rd generation assay   performance.       Vt 450         WBC   11.68             Significant Diagnostics:  I have reviewed all pertinent imaging results/findings within the past 24 hours.

## 2018-12-03 NOTE — ASSESSMENT & PLAN NOTE
-due to PMVT, unclear precipitating factor (either hypoxia driven in the setting of an acute seizure episode), pt has mildly elevated troponin post arrest and EKG did not show evidence of Q waves or ST/T wave changes along with Echo that does not show WMA, no acidosis or severe electrolyte derangements noted and CXR without evidence of PTX  -pt has been quiet on telemetry with no recurrent VT  -c/w IV Amiodarone load for 24 hours and then 400 mg PO BID  -will need ischemic evaluation for VT w/u when pt is extubated and stable

## 2018-12-03 NOTE — SUBJECTIVE & OBJECTIVE
Past Medical History:   Diagnosis Date    Altered mental status 11/22/2018    Leukocytosis 11/22/2018    Seborrheic keratoses 11/26/2018    V-tach 11/27/2018       History reviewed. No pertinent surgical history.    Review of patient's allergies indicates:   Allergen Reactions    Phenobarbital     Sulfa (sulfonamide antibiotics)     Tegretol [carbamazepine]     Tylenol [acetaminophen]        No current facility-administered medications on file prior to encounter.      Current Outpatient Medications on File Prior to Encounter   Medication Sig    amLODIPine (NORVASC) 5 MG tablet Take 5 mg by mouth once daily.    DULoxetine (CYMBALTA) 60 MG capsule Take 60 mg by mouth once daily.    levETIRAcetam (KEPPRA) 750 MG Tab Take 1,500 mg by mouth 2 (two) times daily.    levothyroxine (SYNTHROID) 100 MCG tablet Take 100 mcg by mouth once daily.    metFORMIN (GLUCOPHAGE) 500 MG tablet Take 500 mg by mouth 2 (two) times daily with meals.    propranolol (INDERAL) 20 MG tablet Take 20 mg by mouth 2 (two) times daily.     Family History     None        Tobacco Use    Smoking status: Never Smoker    Smokeless tobacco: Never Used   Substance and Sexual Activity    Alcohol use: No     Frequency: Never    Drug use: No    Sexual activity: Not Currently     Partners: Male     Review of Systems   Unable to perform ROS: intubated     Objective:     Vital Signs (Most Recent):  Temp: 98.3 °F (36.8 °C) (12/02/18 1900)  Pulse: 82 (12/02/18 2120)  Resp: 16 (12/02/18 2120)  BP: (!) 117/52 (12/02/18 1800)  SpO2: 100 % (12/02/18 2120) Vital Signs (24h Range):  Temp:  [97.1 °F (36.2 °C)-99 °F (37.2 °C)] 98.3 °F (36.8 °C)  Pulse:  [] 82  Resp:  [8-38] 16  SpO2:  [99 %-100 %] 100 %  BP: ()/() 117/52  Arterial Line BP: ()/(42-64) 145/55     Weight: 92.5 kg (204 lb)  Body mass index is 36.14 kg/m².    SpO2: 100 %  O2 Device (Oxygen Therapy): ventilator      Intake/Output Summary (Last 24 hours) at 12/2/2018  2253  Last data filed at 12/2/2018 1901  Gross per 24 hour   Intake 2115.04 ml   Output 100 ml   Net 2015.04 ml       Lines/Drains/Airways     Drain                 NG/OG Tube 12/01/18 2337 orogastric less than 1 day          Airway                 Airway - Non-Surgical 12/01/18 2301 Endotracheal Tube less than 1 day          Arterial Line                 Arterial Line 12/01/18 1156 Right Brachial 1 day          Peripheral Intravenous Line                 Peripheral IV - Single Lumen 11/28/18 1551 Right Antecubital 4 days         Peripheral IV - Single Lumen 12/01/18 0936 Left Forearm 1 day                Physical Exam   HENT:   ETT in place   Eyes: Pupils are equal, round, and reactive to light.   Neck: Neck supple. No JVD present.   Cardiovascular: Normal rate, regular rhythm and intact distal pulses.   Pulmonary/Chest: Effort normal.   Abdominal: Soft.   Neurological: She is alert.   Skin: Skin is warm.       Significant Labs: All pertinent lab results from the last 24 hours have been reviewed.    Significant Imaging: Echocardiogram:   Transthoracic echo (TTE) complete (Cupid Only):   Results for orders placed or performed during the hospital encounter of 11/22/18   Transthoracic echo (TTE) complete (Cupid Only)   Result Value Ref Range    Ascending aorta 2.71 cm    STJ 2.56 cm    AV mean gradient 16.32 mmHg    Ao peak alverto 2.53 m/s    Ao VTI 46.63 cm    IVRT 0.08 msec    IVS 1.00 0.6 - 1.1 cm    LA size 3.73 cm    Left Atrium Major Axis 5.55 cm    Left Atrium Minor Axis 5.48 cm    LVIDD 4.43 3.5 - 6.0 cm    LVIDS 3.18 2.1 - 4.0 cm    LVOT diameter 2.10 cm    LVOT peak VTI 24.00 cm    PW 1.01 0.6 - 1.1 cm    MV Peak A Alverto 0.97 m/s    E wave decelartion time 189.14 msec    MV Peak E Alverto 1.25 m/s    PV Peak D Alverto 0.70 m/s    PV Peak S Alverto 0.68 m/s    RA Major Axis 4.68 cm    RA Width 3.80 cm    RVDD 3.75 cm    Sinus 2.70 cm    TAPSE 2.20 cm    TR Max Alverto 2.86 m/s    TDI LATERAL 0.11     TDI SEPTAL 0.09     LA WIDTH  4.24 cm    LV Diastolic Volume 89.27 mL    LV Systolic Volume 40.45 mL    RV S' 14.85 m/s    LV LATERAL E/E' RATIO 11.36     LV SEPTAL E/E' RATIO 13.89     FS 28 %    LA volume 74.13 cm3    LV mass 150.49 g    Left Ventricle Relative Wall Thickness 0.46 cm    AV valve area 1.78 cm2    AV index (prosthetic) 0.51     E/A ratio 1.29     Mean e' 0.10     Pulm vein S/D ratio 0.97     LVOT area 3.46 cm2    LVOT stroke volume 83.08 cm3    AV peak gradient 25.60 mmHg    E/E' ratio 12.50     Triscuspid Valve Regurgitation Peak Gradient 32.72 mmHg    BSA 2.03 m2    LV Systolic Volume Index 19.9 mL/m2    LV Diastolic Volume Index 43.98 mL/m2    LA Volume Index 36.5 mL/m2    LV Mass Index 74.1 g/m2

## 2018-12-03 NOTE — PROGRESS NOTES
[facility]  Neurosurgery  Progress Note    Subjective:     History of Present Illness: Melania Malagon is a 73 y.o. year old female with PMHx of HTN, DM, seizure disorder who lives in NH and transferred to [facility] with SE and cervical frx for care escalation. Unable to obtain hx due to AMS. Per charting, she had seizure at NH followed by mechanical fall. At OSH, she was intubated for airway protection and loaded with AED. Lab was significant for leucocytosis, lactic acidosis , and elevated ammonia. CT head was negative for acute process. C spine CT shows minimally displaced type 1 samuel frx and type 2 odontoid frx without retropulsion or canal compromise. She is on ASA per charting. NSGY consulted for further evaluation and possible intervention.    Post-Op Info:  * No surgery found *             Medications:  Continuous Infusions:   sodium chloride 0.9% 50 mL/hr at 12/02/18 1800    amiodarone in dextrose 5% 0.5 mg/min (12/02/18 1800)     Scheduled Meds:   aspirin  81 mg Per OG tube Daily    chlorhexidine  15 mL Mouth/Throat BID    famotidine  20 mg Per OG tube BID    ferrous sulfate  325 mg Oral BID    heparin (porcine)  5,000 Units Subcutaneous Q8H    levETIRAcetam  2,000 mg Per NG tube BID    levothyroxine  100 mcg Per NG tube Before breakfast    lorazepam  2 mg Intravenous Once    miconazole NITRATE 2 %   Topical (Top) BID    senna-docusate 8.6-50 mg  2 tablet Per NG tube BID    sodium chloride 0.9%  3 mL Intravenous Q8H     PRN Meds:acetaminophen, albuterol-ipratropium, dextrose 50%, fentaNYL, glucagon (human recombinant), insulin aspart U-100, magnesium oxide, magnesium oxide, miconazole nitrate 2%, ondansetron, potassium chloride 10%, potassium chloride 10%, potassium chloride 10%, potassium, sodium phosphates, potassium, sodium phosphates, potassium, sodium phosphates, sodium chloride 0.9%     Review of Systems    Objective:     Weight: 92.5 kg (204 lb)  Body mass index is  36.14 kg/m².  Vital Signs (Most Recent):  Temp: 98.8 °F (37.1 °C) (12/02/18 1500)  Pulse: 82 (12/02/18 1800)  Resp: 15 (12/02/18 1800)  BP: (!) 117/52 (12/02/18 1800)  SpO2: 99 % (12/02/18 1800) Vital Signs (24h Range):  Temp:  [97.1 °F (36.2 °C)-99 °F (37.2 °C)] 98.8 °F (37.1 °C)  Pulse:  [] 82  Resp:  [14-29] 15  SpO2:  [99 %-100 %] 99 %  BP: ()/(48-70) 117/52  Arterial Line BP: (100-155)/(43-64) 143/56     Date 12/02/18 0700 - 12/03/18 0659   Shift 1179-4867 7810-1296 3061-0387 24 Hour Total   INTAKE   I.V.(mL/kg) 83.5(0.9) 413.5(4.5)  497(5.4)   IV Piggyback 100   100   Shift Total(mL/kg) 183.5(2) 413.5(4.5)  597(6.5)   OUTPUT   Shift Total(mL/kg)       Weight (kg) 92.9 92.5 92.5 92.5              Vent Mode: A/C  Oxygen Concentration (%):  [] 40  Resp Rate Total:  [14 br/min-62 br/min] 15 br/min  Vt Set:  [450 mL] 450 mL  PEEP/CPAP:  [5 cmH20] 5 cmH20  Pressure Support:  [0 cmH20] 0 cmH20  Mean Airway Pressure:  [9.9 ssR09-60 cmH20] 9.9 cmH20         Neurosurgery Physical Exam     Awake, intubated  C collar in place  FC x4      Significant Labs:  Recent Labs   Lab 12/01/18  0247 12/01/18  2347 12/02/18  0145   * 136* 123*    140 141   K 4.4 3.2* 3.7    107 109   CO2 22* 21* 25   BUN 25* 24* 25*   CREATININE 0.7 0.9 0.8   CALCIUM 8.2* 8.0* 7.8*   MG 2.1 1.9 1.8     Recent Labs   Lab 12/01/18  0247 12/02/18  0033 12/02/18  0145   WBC 8.88 11.68 14.66*   HGB 8.1* 8.0* 7.9*   HCT 28.2* 26.3* 27.2*   * 305 329     Recent Labs   Lab 12/01/18  2347   INR 1.3*   APTT 22.5     Microbiology Results (last 7 days)     Procedure Component Value Units Date/Time    Blood culture (site 1) [[id number]] Collected:  11/22/18 1712    Order Status:  Completed Specimen:  Blood from Peripheral, Antecubital, Left Updated:  11/28/18 0612     Blood Culture, Routine No growth after 5 days.    Narrative:       Site # 1, aerobic and anaerobic    Blood culture (site 2) [[id number]] Collected:   11/22/18 1724    Order Status:  Completed Specimen:  Blood from Peripheral, Wrist, Right Updated:  11/28/18 0612     Blood Culture, Routine No growth after 5 days.    Narrative:       Site # 2, aerobic only    Culture, Respiratory with Gram Stain [[id number]] Collected:  11/23/18 1132    Order Status:  Completed Specimen:  Respiratory from Endotracheal Aspirate Updated:  11/26/18 1416     Respiratory Culture --     OKSANA ALBICANS  Few  Normal respiratory rizwan also present       Gram Stain (Respiratory) Few WBC's     Gram Stain (Respiratory) No organisms seen    Narrative:       Mini-BAL.    Culture, Respiratory with Gram Stain [[id number]] Collected:  11/22/18 1520    Order Status:  Completed Specimen:  Respiratory from Sputum Updated:  11/26/18 0804     Respiratory Culture Normal respiratory rizwan     Gram Stain (Respiratory) <10 epithelial cells per low power field.     Gram Stain (Respiratory) Few WBC's     Gram Stain (Respiratory) No organisms seen        Recent Lab Results       12/02/18  1705   12/02/18  1014   12/02/18  0952   12/02/18  0905   12/02/18  0145        Immature Granulocytes         0.8     Immature Grans (Abs)         0.11  Comment:  Mild elevation in immature granulocytes is non specific and   can be seen in a variety of conditions including stress response,   acute inflammation, trauma and pregnancy. Correlation with other   laboratory and clinical findings is essential.       Albumin         2.1     Alkaline Phosphatase         57     Allens Test N/A   N/A         ALT         17     Anion Gap         7     aPTT               AST         39     Baso #         0.01     Basophil%         0.1     Total Bilirubin         1.0  Comment:  For infants and newborns, interpretation of results should be based  on gestational age, weight and in agreement with clinical  observations.  Premature Infant recommended reference ranges:  Up to 24 hours.............<8.0 mg/dL  Up to 48 hours............<12.0  mg/dL  3-5 days..................<15.0 mg/dL  6-29 days.................<15.0 mg/dL       Site [place]   [place]         BUN, Bld         25     Calcium         7.8     Chloride         109     CO2         25     CPK               Creatinine         0.8     DelSys Adult Vent   Adult Vent         Differential Method         Automated     eGFR if          >60.0     eGFR if non          >60.0  Comment:  Calculation used to obtain the estimated glomerular filtration  rate (eGFR) is the CKD-EPI equation.        Eos #         0.0     Eosinophil%         0.0     FiO2 100   40         Free T4         0.82     Glucose         123     Gran # (ANC)         10.8     Gran%         73.6     Group & Rh               Hematocrit         27.2     Hemoglobin         7.9     Coumadin Monitoring INR               Lactate, Oliver       1.5  Comment:  Falsely low lactic acid results can be found in samples   containing >=13.0 mg/dL total bilirubin and/or >=3.5 mg/dL   direct bilirubin.         Lymph #         2.1     Lymph%         14.3     Magnesium         1.8     MCH         23.9     MCHC         29.0     MCV         82     Min Vol 8.4   7.7         Mode AC/PRVC   AC/PRVC         Mono #         1.6     Mono%         11.2     MPV         9.3     nRBC         0     PEEP 5   5         Phosphorus         2.6     PiP 28   31         Platelets         329     POC BE 2   -2         POC HCO3 26.2   21.6         POC PCO2 40.3   29.6         POC PH 7.421   7.471         POC PO2 273   86         POC SATURATED O2 100   97         POC TCO2 27   23         POCT Glucose   99           Potassium         3.7     Total Protein         6.1     Protime               Rate 14   14         RBC         3.30     RDW         21.0     Sample ARTERIAL   ARTERIAL         Sodium         141     Sp02 100   100         Troponin I       0.261  Comment:  The reference interval for Troponin I represents the 99th percentile   cutoff   for  our facility and is consistent with 3rd generation assay   performance.         Vt 450   450         WBC         14.66                      12/02/18  0036   12/02/18  0033   12/01/18  2347        Immature Granulocytes   0.9       Immature Grans (Abs)   0.10  Comment:  Mild elevation in immature granulocytes is non specific and   can be seen in a variety of conditions including stress response,   acute inflammation, trauma and pregnancy. Correlation with other   laboratory and clinical findings is essential.         Albumin     2.3     Alkaline Phosphatase     68     Allens Test N/A         ALT     19     Anion Gap     12     aPTT     22.5  Comment:  aPTT therapeutic range = 39-69 seconds     AST     43     Baso #   0.01       Basophil%   0.1       Total Bilirubin     0.9  Comment:  For infants and newborns, interpretation of results should be based  on gestational age, weight and in agreement with clinical  observations.  Premature Infant recommended reference ranges:  Up to 24 hours.............<8.0 mg/dL  Up to 48 hours............<12.0 mg/dL  3-5 days..................<15.0 mg/dL  6-29 days.................<15.0 mg/dL       Site [place]/[facility]         BUN, Bld     24     Calcium     8.0     Chloride     107     CO2     21     CPK     89     Creatinine     0.9     DelSys Adult Vent         Differential Method   Automated       eGFR if      >60.0     eGFR if non      >60.0  Comment:  Calculation used to obtain the estimated glomerular filtration  rate (eGFR) is the CKD-EPI equation.        Eos #   0.0       Eosinophil%   0.0       FiO2 100         Free T4           Glucose     136     Gran # (ANC)   9.4       Gran%   80.0       Group & Rh     O NEG     Hematocrit   26.3       Hemoglobin   8.0       Coumadin Monitoring INR     1.3  Comment:  Coumadin Therapy:  2.0 - 3.0 for INR for all indicators except mechanical heart valves  and antiphospholipid syndromes which should use 2.5 - 3.5.        Lactate, Oliver     3.4  Comment:  Falsely low lactic acid results can be found in samples   containing >=13.0 mg/dL total bilirubin and/or >=3.5 mg/dL   direct bilirubin.       Lymph #   1.0       Lymph%   8.7       Magnesium     1.9     MCH   25.3       MCHC   30.4       MCV   83       Min Vol           Mode AC/PRVC         Mono #   1.2       Mono%   10.3       MPV   9.6       nRBC   0       PEEP 5         Phosphorus     3.1     PiP           Platelets   305       POC BE 3         POC HCO3 27.3         POC PCO2 42.2         POC PH 7.419         POC PO2 260         POC SATURATED O2 100         POC TCO2 29         POCT Glucose           Potassium     3.2     Total Protein     6.6     Protime     13.2     Rate 14         RBC   3.16       RDW   21.0       Sample ARTERIAL         Sodium     140     Sp02 100         Troponin I     0.144  Comment:  The reference interval for Troponin I represents the 99th percentile   cutoff   for our facility and is consistent with 3rd generation assay   performance.       Vt 450         WBC   11.68             Significant Diagnostics:  I have reviewed all pertinent imaging results/findings within the past 24 hours.    Assessment/Plan:     * Cervical spine fracture    72 yo female admitted to [facility] with NCSE also found to have type I atlas and type II odontoid fractures.    Interval imaging stable fractures. Pt on exam is following commands, remains intubated. [id number].    --Continue care per primary team.  --Continue C collar at all times  --Will arrange for outpatient follow up to discuss surgical options.   --Neurosurgery will be signing off, please contact us with any questions or concerns.               Tno Barry MD  Neurosurgery  [facility]

## 2018-12-03 NOTE — HPI
73 year old female with PMHx significant for HTN, DM2 and epilepsy who presented from nursing home after a fall. In transit to the hospital, she had a seizure episode and again on arrival to the ER which was a witnessed tonic clonic seizure. After the recurrent seizure she became unresponsive and was intubated and given IV fosphenytonin. Workup involved head CT scan which was negative, CT neck showed odontoid fracture and C1 fracture. She was started on ketamine drip and then transferred to [facility] for cervical fracture requiring neurosurgical intervention and also seizure monitoring and control. While hospitalized here, she was loaded with Keppra, started on seroquel and extubated. On 12/1 at 10 pm, patient started having jerking movements and became cyanotic and unresponsive. On telemetry it was noted PMVT. She was emergently intubated. Started on amiodarone infusion. EEG continuous monitoring was started. Cardiology is being consulted for assistance with workup for cardiac arrest and management of VT. Post arrest, EKG showed NSR. Review of pt's labs do show gross abnormalities. A TTE was done that showed EF of 55%. Pt is able to response and awake. Difficult to obtain additional patient history.

## 2018-12-03 NOTE — ASSESSMENT & PLAN NOTE
72 yo female admitted to [facility] with NCSE also found to have type I atlas and type II odontoid fractures.    Interval imaging stable fractures. Pt on exam is following commands, remains intubated. [id number].    --Continue care per primary team.  --Continue C collar at all times  --Will arrange for outpatient follow up to discuss surgical options.   --Neurosurgery will be signing off, please contact us with any questions or concerns.

## 2018-12-03 NOTE — PLAN OF CARE
Problem: Patient Care Overview  Goal: Plan of Care Review  Outcome: Ongoing (interventions implemented as appropriate)  POC reviewed with pt at 0500. Pt is unable to verbalize understanding r/t intubation. Tylenol given x1 for a fever of 100.6. Amio gtt continued. Not enough good peripheral access to continue NS drip. PICC consult placed for poor venous access. C collar continued. Purwick in place. cEEG continued. Fentanyl given x2. Questions and concerns addressed. Pt progressing toward goals. Will continue to monitor. See flowsheets for full assessment and VS info

## 2018-12-03 NOTE — ASSESSMENT & PLAN NOTE
VF arrest verses unstable Torsades  Appreciate cards recs  Continue amio  Needs workup once stable  Treat seizures

## 2018-12-03 NOTE — ASSESSMENT & PLAN NOTE
Intubated due to unstable VT episode  Wean to extubate  CXR  ABG  Vent Mode: Spont  Oxygen Concentration (%):  [] 40  Resp Rate Total:  [14 br/min-62 br/min] 21 br/min  Vt Set:  [450 mL] 450 mL  PEEP/CPAP:  [5 cmH20] 5 cmH20  Pressure Support:  [0 cmH20-10 cmH20] 10 cmH20  Mean Airway Pressure:  [8.1 dbU88-46 cmH20] 9 cmH20

## 2018-12-03 NOTE — NURSING
Extubated to 2L NC by RT. Dr. Amezcua at bedside. Pt tolerated well. Will continue to monitor very closely.

## 2018-12-03 NOTE — PROGRESS NOTES
" [facility]  Adult Nutrition  Progress Note    SUMMARY       Recommendations    Recommendation/Intervention:   1. As medically able recommend restarting TF.   Glucerna 1.5 @ goal rate 45mL/hr. Provides 1620kcals, 89g protein, 820ml free water.   Hold for residuals >500mL  2. If able to extubate and advance diet recommend diabetic diet with texture per SLP recommendations.   RD to monitor   Goals: Pt to receive nutrition by RD follow up  Nutrition Goal Status: goal met  Communication of RD Recs: (POC)    Reason for Assessment    Reason for Assessment: RD follow-up  Diagnosis: seizures  Relevant Medical History: HTN, seizures, DM  Interdisciplinary Rounds: attended  General Information Comments: Pt currently NPO with TF canceled for possible extubation. Pt appears nourished with no physical signs of malnutrition at this time. RD to monitor wt and intake  Nutrition Discharge Planning: unable to determine at this time.    Nutrition Risk Screen    Nutrition Risk Screen: tube feeding or parenteral nutrition    Nutrition/Diet History    Do you have any cultural, spiritual, Christian conflicts, given your current situation?: none  Factors Affecting Nutritional Intake: NPO, on mechanical ventilation    Anthropometrics    Temp: 99.3 °F (37.4 °C)  Height Method: Measured  Height: 5' 3" (160 cm)  Height (inches): 63 in  Weight Method: Bed Scale  Weight: 92.5 kg (204 lb)  Weight (lb): 204 lb  Ideal Body Weight (IBW), Female: 115 lb  % Ideal Body Weight, Female (lb): 177.39 lb  BMI (Calculated): 36.2  BMI Grade: 30 - 34.9- obesity - grade I       Lab/Procedures/Meds    Pertinent Labs Reviewed: reviewed  Pertinent Labs Comments: AST 44  Pertinent Medications Reviewed: reviewed  Pertinent Medications Comments: heparin, levitiracetam, lorazepam    Physical Findings/Assessment    Overall Physical Appearance: nourished, obese, on ventilator support  Tubes: orogastric tube  Skin: edema, intact    Estimated/Assessed " Needs    Weight Used For Calorie Calculations: 84.4 kg (186 lb 1.1 oz)  Energy Calorie Requirements (kcal): 1495  Energy Need Method: [place] (modified)  Protein Requirements: 79-105g(1.5-2.0g/kg)  Weight Used For Protein Calculations: 52.3 kg (115 lb 4.8 oz)  Fluid Requirements (mL): 1ml/kcal or per MD+  Fluid Need Method: RDA Method  RDA Method (mL): 1495  CHO Requirement: 50% of total kcals      Nutrition Prescription Ordered    Current Diet Order: NPO  Nutrition Order Comments: TF held  Current Nutrition Support Formula Ordered: Other (Comment)(canceled)    Evaluation of Received Nutrient/Fluid Intake    I/O: +I/O, good UOP  Energy Calories Required: not meeting needs  Protein Required: not meeting needs  Fluid Required: not meeting needs  Comments: LBM: 12/2  % Intake of Estimated Energy Needs: 0 - 25 %  % Meal Intake: 0 - 25 %    Nutrition Risk    Level of Risk/Frequency of Follow-up: high     Assessment and Plan     Nutrition Problem  Inadequate energy intake     Related to (etiology):   NPO     Signs and Symptoms (as evidenced by):   Pt receiving <85% EEN and EPN.      Intervention:  Initiate enteral nutrition or carbohydrate consistent diet pending medical course     Nutrition Diagnosis Status:   Improving      Monitor and Evaluation    Food and Nutrient Intake: energy intake, food and beverage intake  Food and Nutrient Adminstration: diet order  Knowledge/Beliefs/Attitudes: food and nutrition knowledge/skill  Physical Activity and Function: nutrition-related ADLs and IADLs  Anthropometric Measurements: weight, weight change  Biochemical Data, Medical Tests and Procedures: electrolyte and renal panel, gastrointestinal profile, glucose/endocrine profile, inflammatory profile, lipid profile  Nutrition-Focused Physical Findings: overall appearance     Nutrition Follow-Up    RD Follow-up?: Yes

## 2018-12-03 NOTE — CONSULTS
[facility]  Cardiology  Consult Note    Patient Name: Melania Malagon  MRN: [id number]  Admission Date: 11/22/2018  Hospital Length of Stay: 10 days  Code Status: Full Code   Attending Provider: Gagandeep Amezcua MD   Consulting Provider: Sejal Schwab MD  Primary Care Physician: Eugene Lorenzo MD  Principal Problem:Cervical spine fracture    Patient information was obtained from patient and ER records.     Inpatient consult to Cardiology  Consult performed by: Sejal Schwab MD  Consult ordered by: Kirby Miranda NP  Reason for consult: VT arrest        Subjective:     Chief Complaint:  S/p Fall    HPI: 73 year old female with PMHx significant for HTN, DM2 and epilepsy who presented from nursing home after a fall. In transit to the hospital, she had a seizure episode and again on arrival to the ER which was a witnessed tonic clonic seizure. After the recurrent seizure she became unresponsive and was intubated and given IV fosphenytonin. Workup involved head CT scan which was negative, CT neck showed odontoid fracture and C1 fracture. She was started on ketamine drip and then transferred to [facility] for cervical fracture requiring neurosurgical intervention and also seizure monitoring and control. While hospitalized here, she was loaded with Keppra, started on seroquel and extubated. On 12/1 at 10 pm, patient started having jerking movements and became cyanotic and unresponsive. On telemetry it was noted PMVT. She was emergently intubated. Started on amiodarone infusion. EEG continuous monitoring was started. Cardiology is being consulted for assistance with workup for cardiac arrest and management of VT. Post arrest, EKG showed NSR. Review of pt's labs do show gross abnormalities. A TTE was done that showed EF of 55%. Pt is able to response and awake. Difficult to obtain additional patient history.      Past Medical History:   Diagnosis Date    Altered mental status 11/22/2018    Leukocytosis  11/22/2018    Seborrheic keratoses 11/26/2018    V-tach 11/27/2018       History reviewed. No pertinent surgical history.    Review of patient's allergies indicates:   Allergen Reactions    Phenobarbital     Sulfa (sulfonamide antibiotics)     Tegretol [carbamazepine]     Tylenol [acetaminophen]        No current facility-administered medications on file prior to encounter.      Current Outpatient Medications on File Prior to Encounter   Medication Sig    amLODIPine (NORVASC) 5 MG tablet Take 5 mg by mouth once daily.    DULoxetine (CYMBALTA) 60 MG capsule Take 60 mg by mouth once daily.    levETIRAcetam (KEPPRA) 750 MG Tab Take 1,500 mg by mouth 2 (two) times daily.    levothyroxine (SYNTHROID) 100 MCG tablet Take 100 mcg by mouth once daily.    metFORMIN (GLUCOPHAGE) 500 MG tablet Take 500 mg by mouth 2 (two) times daily with meals.    propranolol (INDERAL) 20 MG tablet Take 20 mg by mouth 2 (two) times daily.     Family History     None        Tobacco Use    Smoking status: Never Smoker    Smokeless tobacco: Never Used   Substance and Sexual Activity    Alcohol use: No     Frequency: Never    Drug use: No    Sexual activity: Not Currently     Partners: Male     Review of Systems   Unable to perform ROS: intubated     Objective:     Vital Signs (Most Recent):  Temp: 98.3 °F (36.8 °C) (12/02/18 1900)  Pulse: 82 (12/02/18 2120)  Resp: 16 (12/02/18 2120)  BP: (!) 117/52 (12/02/18 1800)  SpO2: 100 % (12/02/18 2120) Vital Signs (24h Range):  Temp:  [97.1 °F (36.2 °C)-99 °F (37.2 °C)] 98.3 °F (36.8 °C)  Pulse:  [] 82  Resp:  [8-38] 16  SpO2:  [99 %-100 %] 100 %  BP: ()/() 117/52  Arterial Line BP: ()/(42-64) 145/55     Weight: 92.5 kg (204 lb)  Body mass index is 36.14 kg/m².    SpO2: 100 %  O2 Device (Oxygen Therapy): ventilator      Intake/Output Summary (Last 24 hours) at 12/2/2018 2253  Last data filed at 12/2/2018 1901  Gross per 24 hour   Intake 2115.04 ml   Output 100 ml    Net 2015.04 ml       Lines/Drains/Airways     Drain                 NG/OG Tube 12/01/18 2337 orogastric less than 1 day          Airway                 Airway - Non-Surgical 12/01/18 2301 Endotracheal Tube less than 1 day          Arterial Line                 Arterial Line 12/01/18 1156 Right Brachial 1 day          Peripheral Intravenous Line                 Peripheral IV - Single Lumen 11/28/18 1551 Right Antecubital 4 days         Peripheral IV - Single Lumen 12/01/18 0936 Left Forearm 1 day                Physical Exam   HENT:   ETT in place   Eyes: Pupils are equal, round, and reactive to light.   Neck: Neck supple. No JVD present.   Cardiovascular: Normal rate, regular rhythm and intact distal pulses.   Pulmonary/Chest: Effort normal.   Abdominal: Soft.   Neurological: She is alert.   Skin: Skin is warm.       Significant Labs: All pertinent lab results from the last 24 hours have been reviewed.    Significant Imaging: Echocardiogram:   Transthoracic echo (TTE) complete (Cupid Only):   Results for orders placed or performed during the hospital encounter of 11/22/18   Transthoracic echo (TTE) complete (Cupid Only)   Result Value Ref Range    Ascending aorta 2.71 cm    STJ 2.56 cm    AV mean gradient 16.32 mmHg    Ao peak alverto 2.53 m/s    Ao VTI 46.63 cm    IVRT 0.08 msec    IVS 1.00 0.6 - 1.1 cm    LA size 3.73 cm    Left Atrium Major Axis 5.55 cm    Left Atrium Minor Axis 5.48 cm    LVIDD 4.43 3.5 - 6.0 cm    LVIDS 3.18 2.1 - 4.0 cm    LVOT diameter 2.10 cm    LVOT peak VTI 24.00 cm    PW 1.01 0.6 - 1.1 cm    MV Peak A Alverto 0.97 m/s    E wave decelartion time 189.14 msec    MV Peak E Alverto 1.25 m/s    PV Peak D Alverto 0.70 m/s    PV Peak S Alverto 0.68 m/s    RA Major Axis 4.68 cm    RA Width 3.80 cm    RVDD 3.75 cm    Sinus 2.70 cm    TAPSE 2.20 cm    TR Max Alverto 2.86 m/s    TDI LATERAL 0.11     TDI SEPTAL 0.09     LA WIDTH 4.24 cm    LV Diastolic Volume 89.27 mL    LV Systolic Volume 40.45 mL    RV S' 14.85 m/s    LV  LATERAL E/E' RATIO 11.36     LV SEPTAL E/E' RATIO 13.89     FS 28 %    LA volume 74.13 cm3    LV mass 150.49 g    Left Ventricle Relative Wall Thickness 0.46 cm    AV valve area 1.78 cm2    AV index (prosthetic) 0.51     E/A ratio 1.29     Mean e' 0.10     Pulm vein S/D ratio 0.97     LVOT area 3.46 cm2    LVOT stroke volume 83.08 cm3    AV peak gradient 25.60 mmHg    E/E' ratio 12.50     Triscuspid Valve Regurgitation Peak Gradient 32.72 mmHg    BSA 2.03 m2    LV Systolic Volume Index 19.9 mL/m2    LV Diastolic Volume Index 43.98 mL/m2    LA Volume Index 36.5 mL/m2    LV Mass Index 74.1 g/m2     Assessment and Plan:     Cardiac arrest    -due to PMVT, unclear precipitating factor (either hypoxia driven in the setting of an acute seizure episode), pt has mildly elevated troponin post arrest and EKG did not show evidence of Q waves or ST/T wave changes along with Echo that does not show WMA, no acidosis or severe electrolyte derangements noted and CXR without evidence of PTX  -pt has been quiet on telemetry with no recurrent VT  -c/w IV Amiodarone load for 24 hours and then 400 mg PO BID  -will need ischemic evaluation for VT w/u when pt is extubated and stable       Thank you for your consult.     Sejal Schwab MD  Cardiology Fellow, PGY-4   [facility]

## 2018-12-04 DIAGNOSIS — S12.100D CLOSED ODONTOID FRACTURE WITH ROUTINE HEALING, SUBSEQUENT ENCOUNTER: Primary | ICD-10-CM

## 2018-12-04 PROBLEM — L30.4 INTERTRIGO: Status: ACTIVE | Noted: 2018-12-04

## 2018-12-04 LAB
ALBUMIN SERPL BCP-MCNC: 2.3 G/DL
ALP SERPL-CCNC: 62 U/L
ALT SERPL W/O P-5'-P-CCNC: 16 U/L
ANION GAP SERPL CALC-SCNC: 7 MMOL/L
AST SERPL-CCNC: 38 U/L
BASOPHILS # BLD AUTO: 0.02 K/UL
BASOPHILS NFR BLD: 0.1 %
BILIRUB SERPL-MCNC: 0.8 MG/DL
BUN SERPL-MCNC: 13 MG/DL
CALCIUM SERPL-MCNC: 7.7 MG/DL
CHLORIDE SERPL-SCNC: 106 MMOL/L
CO2 SERPL-SCNC: 25 MMOL/L
CREAT SERPL-MCNC: 1 MG/DL
DIFFERENTIAL METHOD: ABNORMAL
EOSINOPHIL # BLD AUTO: 0.2 K/UL
EOSINOPHIL NFR BLD: 1.2 %
ERYTHROCYTE [DISTWIDTH] IN BLOOD BY AUTOMATED COUNT: 21.7 %
EST. GFR  (AFRICAN AMERICAN): >60 ML/MIN/1.73 M^2
EST. GFR  (NON AFRICAN AMERICAN): 56.8 ML/MIN/1.73 M^2
GLUCOSE SERPL-MCNC: 156 MG/DL
HCT VFR BLD AUTO: 27.1 %
HGB BLD-MCNC: 7.9 G/DL
IMM GRANULOCYTES # BLD AUTO: 0.06 K/UL
IMM GRANULOCYTES NFR BLD AUTO: 0.4 %
LYMPHOCYTES # BLD AUTO: 2.6 K/UL
LYMPHOCYTES NFR BLD: 19.2 %
MAGNESIUM SERPL-MCNC: 1.5 MG/DL
MCH RBC QN AUTO: 24.1 PG
MCHC RBC AUTO-ENTMCNC: 29.2 G/DL
MCV RBC AUTO: 83 FL
MONOCYTES # BLD AUTO: 1.3 K/UL
MONOCYTES NFR BLD: 9.9 %
NEUTROPHILS # BLD AUTO: 9.2 K/UL
NEUTROPHILS NFR BLD: 69.2 %
NRBC BLD-RTO: 0 /100 WBC
PHOSPHATE SERPL-MCNC: 2.2 MG/DL
PLATELET # BLD AUTO: 281 K/UL
PMV BLD AUTO: 9.9 FL
POCT GLUCOSE: 112 MG/DL (ref 70–110)
POCT GLUCOSE: 117 MG/DL (ref 70–110)
POCT GLUCOSE: 117 MG/DL (ref 70–110)
POCT GLUCOSE: 120 MG/DL (ref 70–110)
POCT GLUCOSE: 136 MG/DL (ref 70–110)
POTASSIUM SERPL-SCNC: 3.8 MMOL/L
PROCALCITONIN SERPL IA-MCNC: 0.1 NG/ML
PROT SERPL-MCNC: 6.4 G/DL
RBC # BLD AUTO: 3.28 M/UL
SODIUM SERPL-SCNC: 138 MMOL/L
WBC # BLD AUTO: 13.35 K/UL

## 2018-12-04 PROCEDURE — 84145 PROCALCITONIN (PCT): CPT

## 2018-12-04 PROCEDURE — 80053 COMPREHEN METABOLIC PANEL: CPT

## 2018-12-04 PROCEDURE — 20000000 HC ICU ROOM

## 2018-12-04 PROCEDURE — 25000003 PHARM REV CODE 250: Performed by: NURSE PRACTITIONER

## 2018-12-04 PROCEDURE — 84100 ASSAY OF PHOSPHORUS: CPT

## 2018-12-04 PROCEDURE — 94640 AIRWAY INHALATION TREATMENT: CPT

## 2018-12-04 PROCEDURE — 25000003 PHARM REV CODE 250: Performed by: PSYCHIATRY & NEUROLOGY

## 2018-12-04 PROCEDURE — 95951 HC EEG MONITORING/VIDEO RECORD: CPT

## 2018-12-04 PROCEDURE — 25000003 PHARM REV CODE 250: Performed by: STUDENT IN AN ORGANIZED HEALTH CARE EDUCATION/TRAINING PROGRAM

## 2018-12-04 PROCEDURE — 99232 SBSQ HOSP IP/OBS MODERATE 35: CPT | Mod: GC,,, | Performed by: INTERNAL MEDICINE

## 2018-12-04 PROCEDURE — 94761 N-INVAS EAR/PLS OXIMETRY MLT: CPT

## 2018-12-04 PROCEDURE — A4216 STERILE WATER/SALINE, 10 ML: HCPCS | Performed by: NURSE PRACTITIONER

## 2018-12-04 PROCEDURE — 63600175 PHARM REV CODE 636 W HCPCS: Performed by: NURSE PRACTITIONER

## 2018-12-04 PROCEDURE — 25000242 PHARM REV CODE 250 ALT 637 W/ HCPCS: Performed by: NURSE PRACTITIONER

## 2018-12-04 PROCEDURE — 85025 COMPLETE CBC W/AUTO DIFF WBC: CPT

## 2018-12-04 PROCEDURE — 27000221 HC OXYGEN, UP TO 24 HOURS

## 2018-12-04 PROCEDURE — 83735 ASSAY OF MAGNESIUM: CPT

## 2018-12-04 PROCEDURE — 99291 CRITICAL CARE FIRST HOUR: CPT | Mod: GC,,, | Performed by: PSYCHIATRY & NEUROLOGY

## 2018-12-04 PROCEDURE — 92610 EVALUATE SWALLOWING FUNCTION: CPT

## 2018-12-04 RX ORDER — AMOXICILLIN 250 MG
2 CAPSULE ORAL 2 TIMES DAILY
Status: DISCONTINUED | OUTPATIENT
Start: 2018-12-05 | End: 2018-12-05

## 2018-12-04 RX ORDER — AMIODARONE HYDROCHLORIDE 200 MG/1
400 TABLET ORAL 2 TIMES DAILY
Status: DISCONTINUED | OUTPATIENT
Start: 2018-12-05 | End: 2018-12-11 | Stop reason: HOSPADM

## 2018-12-04 RX ORDER — FAMOTIDINE 20 MG/1
20 TABLET, FILM COATED ORAL 2 TIMES DAILY
Status: DISCONTINUED | OUTPATIENT
Start: 2018-12-05 | End: 2018-12-05

## 2018-12-04 RX ORDER — NAPROXEN SODIUM 220 MG/1
81 TABLET, FILM COATED ORAL DAILY
Status: DISCONTINUED | OUTPATIENT
Start: 2018-12-05 | End: 2018-12-11 | Stop reason: HOSPADM

## 2018-12-04 RX ORDER — LEVOTHYROXINE SODIUM 100 UG/1
100 TABLET ORAL
Status: DISCONTINUED | OUTPATIENT
Start: 2018-12-05 | End: 2018-12-11 | Stop reason: HOSPADM

## 2018-12-04 RX ADMIN — Medication 3 ML: at 10:12

## 2018-12-04 RX ADMIN — FERROUS SULFATE TAB EC 325 MG (65 MG FE EQUIVALENT) 325 MG: 325 (65 FE) TABLET DELAYED RESPONSE at 08:12

## 2018-12-04 RX ADMIN — MAGNESIUM OXIDE TAB 400 MG (241.3 MG ELEMENTAL MG) 800 MG: 400 (241.3 MG) TAB at 03:12

## 2018-12-04 RX ADMIN — FERROUS SULFATE TAB EC 325 MG (65 MG FE EQUIVALENT) 325 MG: 325 (65 FE) TABLET DELAYED RESPONSE at 09:12

## 2018-12-04 RX ADMIN — Medication 3 ML: at 02:12

## 2018-12-04 RX ADMIN — IPRATROPIUM BROMIDE AND ALBUTEROL SULFATE 3 ML: .5; 3 SOLUTION RESPIRATORY (INHALATION) at 02:12

## 2018-12-04 RX ADMIN — AMIODARONE HYDROCHLORIDE 400 MG: 200 TABLET ORAL at 08:12

## 2018-12-04 RX ADMIN — MICONAZOLE NITRATE: 20 OINTMENT TOPICAL at 08:12

## 2018-12-04 RX ADMIN — HEPARIN SODIUM 5000 UNITS: 5000 INJECTION, SOLUTION INTRAVENOUS; SUBCUTANEOUS at 02:12

## 2018-12-04 RX ADMIN — FENTANYL CITRATE 50 MCG: 50 INJECTION INTRAMUSCULAR; INTRAVENOUS at 08:12

## 2018-12-04 RX ADMIN — FAMOTIDINE 20 MG: 20 TABLET ORAL at 09:12

## 2018-12-04 RX ADMIN — AMIODARONE HYDROCHLORIDE 400 MG: 200 TABLET ORAL at 09:12

## 2018-12-04 RX ADMIN — POTASSIUM & SODIUM PHOSPHATES POWDER PACK 280-160-250 MG 2 PACKET: 280-160-250 PACK at 03:12

## 2018-12-04 RX ADMIN — CHLORHEXIDINE GLUCONATE 0.12% ORAL RINSE 15 ML: 1.2 LIQUID ORAL at 08:12

## 2018-12-04 RX ADMIN — FENTANYL CITRATE 50 MCG: 50 INJECTION INTRAMUSCULAR; INTRAVENOUS at 02:12

## 2018-12-04 RX ADMIN — LEVOTHYROXINE SODIUM 100 MCG: 100 TABLET ORAL at 06:12

## 2018-12-04 RX ADMIN — CHLORHEXIDINE GLUCONATE 0.12% ORAL RINSE 15 ML: 1.2 LIQUID ORAL at 09:12

## 2018-12-04 RX ADMIN — SENNOSIDES AND DOCUSATE SODIUM 2 TABLET: 8.6; 5 TABLET ORAL at 09:12

## 2018-12-04 RX ADMIN — FENTANYL CITRATE 50 MCG: 50 INJECTION INTRAMUSCULAR; INTRAVENOUS at 11:12

## 2018-12-04 RX ADMIN — MICONAZOLE NITRATE: 20 OINTMENT TOPICAL at 09:12

## 2018-12-04 RX ADMIN — ACETAMINOPHEN 650 MG: 325 TABLET, FILM COATED ORAL at 02:12

## 2018-12-04 RX ADMIN — LEVETIRACETAM 2000 MG: 750 TABLET ORAL at 09:12

## 2018-12-04 RX ADMIN — HEPARIN SODIUM 5000 UNITS: 5000 INJECTION, SOLUTION INTRAVENOUS; SUBCUTANEOUS at 10:12

## 2018-12-04 RX ADMIN — HEPARIN SODIUM 5000 UNITS: 5000 INJECTION, SOLUTION INTRAVENOUS; SUBCUTANEOUS at 06:12

## 2018-12-04 RX ADMIN — ASPIRIN 81 MG CHEWABLE TABLET 81 MG: 81 TABLET CHEWABLE at 09:12

## 2018-12-04 RX ADMIN — Medication 3 ML: at 06:12

## 2018-12-04 RX ADMIN — LEVETIRACETAM 2000 MG: 750 TABLET ORAL at 08:12

## 2018-12-04 RX ADMIN — FAMOTIDINE 20 MG: 20 TABLET ORAL at 08:12

## 2018-12-04 NOTE — SUBJECTIVE & OBJECTIVE
Interval History: patient remained intubated with minimal response and still connected to EEG.    Review of Systems   Unable to perform ROS: intubated     Objective:     Vital Signs (Most Recent):  Temp: 99.7 °F (37.6 °C) (12/03/18 1502)  Pulse: 94 (12/03/18 1728)  Resp: (!) 33 (12/03/18 1728)  BP: (!) 137/56 (12/03/18 1502)  SpO2: 100 % (12/03/18 1728) Vital Signs (24h Range):  Temp:  [98.2 °F (36.8 °C)-100.6 °F (38.1 °C)] 99.7 °F (37.6 °C)  Pulse:  [55-96] 94  Resp:  [3-33] 33  SpO2:  [99 %-100 %] 100 %  BP: (108-161)/(52-72) 137/56  Arterial Line BP: (116-174)/(46-72) 152/62     Weight: 92.5 kg (204 lb)  Body mass index is 36.14 kg/m².     SpO2: 100 %  O2 Device (Oxygen Therapy): ventilator      Intake/Output Summary (Last 24 hours) at 12/3/2018 1809  Last data filed at 12/3/2018 1702  Gross per 24 hour   Intake 384.66 ml   Output 450 ml   Net -65.34 ml       Lines/Drains/Airways     Central Venous Catheter Line                 Percutaneous Central Line Insertion/Assessment - triple lumen  12/03/18 1445 right femoral vein less than 1 day          Drain                 NG/OG Tube 12/01/18 2337 orogastric 1 day    Female External Urinary Catheter 12/03/18 0600 less than 1 day          Arterial Line                 Arterial Line 12/01/18 1156 Right Brachial 2 days          Peripheral Intravenous Line                 Peripheral IV - Single Lumen 12/03/18 0514 Left Forearm less than 1 day         Peripheral IV - Single Lumen 12/03/18 0515 Left Hand less than 1 day         Peripheral IV - Single Lumen 12/03/18 1054 Left Upper Arm less than 1 day                Physical Exam    Significant Labs:   CMP   Recent Labs   Lab 12/01/18  2347 12/02/18  0145 12/03/18  0158 12/03/18  1518    141 140  --    K 3.2* 3.7 3.8 4.0    109 108  --    CO2 21* 25 25  --    * 123* 106  --    BUN 24* 25* 23  --    CREATININE 0.9 0.8 1.1  --    CALCIUM 8.0* 7.8* 8.0*  --    PROT 6.6 6.1 6.6  --    ALBUMIN 2.3* 2.1* 2.4*   --    BILITOT 0.9 1.0 0.6  --    ALKPHOS 68 57 63  --    AST 43* 39 44*  --    ALT 19 17 19  --    ANIONGAP 12 7* 7*  --    ESTGFRAFRICA >60.0 >60.0 58.4*  --    EGFRNONAA >60.0 >60.0 50.6*  --    , CBC   Recent Labs   Lab 12/02/18  0033 12/02/18  0145 12/03/18  0158   WBC 11.68 14.66* 10.61   HGB 8.0* 7.9* 8.4*   HCT 26.3* 27.2* 29.1*    329 334    and INR   Recent Labs   Lab 12/01/18  2347 12/03/18  0158   INR 1.3* 1.2       Significant Imaging:     EKG (12/02/2018): NSR with flattened TWI in the high lateral leads.    2D Echo (12/02/2018):  Left Ventricle Normal ejection fraction at 55%. Cavity is normal. Concentric remodeling observed. Indeterminate left ventricular diastolic function.   Right Ventricle Normal cavity size and ejection fraction.   Left Atrium Mild atrial enlargement. LEONARD is 37ml/m2   Aortic Valve There is mild of the aortic valve present. Aortic valve sclerosis is mild. Mild stenosis. Mobility is mildly reduced. RICKIE is 1.78 cm2; mean gradient is 16.32 mmHg; peak gradient is 25.60 mmHg; AV peak velocity is 2.53 m/s.   Mitral Valve Normal valve structure. Mild mitral sclerosis. Normal leaflet mobility.   Tricuspid Valve The tricuspid valve is normal. Mild regurgitation. Mobility is normal   IVC/SVC Inferior vena cava is not well visualized.   Ascending Aorta Normal aortic root, size and contour.   Pericardium No pericardial effusion. Pericardium is normal.

## 2018-12-04 NOTE — PLAN OF CARE
Problem: Patient Care Overview  Goal: Plan of Care Review  Outcome: Ongoing (interventions implemented as appropriate)  POC reviewed with pt at 0500. Pt verbalized understanding. C collar in place. SADIA performed by RN and patient passed. Flotrak in place. NS gtt continued. cEEG in place. Questions and concerns addressed. No acute events overnight. Pt progressing toward goals. Will continue to monitor. See flowsheets for full assessment and VS info

## 2018-12-04 NOTE — PLAN OF CARE
IM B will assume care of patient when she arrives on the floor.    73 y F transferred from [place] for cervical spine injury and seizure activity. She has pmh of HTN, DM, seizure disorder and had an unwitnessed seizure (presumed) at nursing home today followed by fall. On arrival by EMS she was responding minimally. Did not require CPR. She had one seizure en route to hospital at [place] and received versed. At ER at OSH, patient had another witnessed seizure with tonic clonic activity in extremities and became unresponsive. She was intubated, and given iv fosphenytoin.  CTH was negative. CT c-spine showed odontoid fracture and C1 fracture. She received versed and fosphenytoin and OSH, when [place] [facility] she was started on ketamine gtt at 0.5 mcg/kg/min. She was [place] [facility] [place] requiring neurosurg intervention and also seizure monitoring and control.           Cervical spine fracture  MRI c-spine without cord compression, contusion or hematoma  CT c-spine with minimally displaced fractures of the posterior arch of the C1 vertebral body in keeping with type 1 Sidney fracture and Type 2 odontoid fracture  C-collar in place  Per NSGY no plans to surgically intervene at the moment  Strict C spine precautions   Follow up NSGY for recs as outpatient      Seizures  Stop EEG  Discussed with epilepsy   continue levetiracetam  No seizures currently on EEG     Restless/Agitated  Off seroquel  No pcdx  Monitor  Much improved      VF arrest vs unstable torsades  Appreciate cards recs  Continue amio until recs other  Needs cardiac workup once stable  Treating seizures      VT  Episode of torsade  Cardiology following  Maintain Mg >2  Monitor sanjiv  Now that extubated cardiology may proceed with ischemic workup      Hypothyroid  Continue synthroid        Consultants and Procedures:      Consultants:  Nutrition  Cardiology  Dermatology  VN  NSGY  PM&R  Wound  care  PT/OT/SLP       Cardiology w/u now stable and extubated  C-collar in place, C spine precautions, F/u NSGY outpatient  Continue Keppra, will dc on that  Maintain Mag >2

## 2018-12-04 NOTE — ASSESSMENT & PLAN NOTE
VF arrest verses unstable Torsades  Appreciate cards recs  Continue amio  Needs cardiac workup once stable  Treating seizures

## 2018-12-04 NOTE — ASSESSMENT & PLAN NOTE
--continue amiodarone  --will discuss with interventional cardiology tomorrow if she appears to be an appropriate candidate tomorrow morning.

## 2018-12-04 NOTE — PLAN OF CARE
Problem: Patient Care Overview  Goal: Plan of Care Review  Outcome: Revised  POC reviewed with pt and family at 1400. Pt verbalized understanding. Questions and concerns addressed. Extubated to 2L NC. R fem CVC placed. CEEG. Multiple liquid stools, fecal incontinence pouch applied. Pt progressing toward goals. Will continue to monitor. See flowsheets for full assessment and VS info.

## 2018-12-04 NOTE — PT/OT/SLP EVAL
Speech Language Pathology Evaluation  Bedside Swallow    Patient Name:  Melania Malagon   MRN:  [id number]  Admitting Diagnosis: Cervical spine fracture    Recommendations:                 General Recommendations:  Dysphagia therapy and Speech language evaluation  Diet recommendations:  Dental Soft, Thin   Aspiration Precautions: Feed only when awake/alert, HOB to 90 degrees, Monitor for s/s of aspiration, Small bites/sips and Standard aspiration precautions   General Precautions: Standard, fall, seizure  Communication strategies:  none    History:     Past Medical History:   Diagnosis Date    Altered mental status 11/22/2018    Leukocytosis 11/22/2018    Seborrheic keratoses 11/26/2018    V-tach 11/27/2018       History reviewed. No pertinent surgical history.      Prior Intubation HX:  11/30    Prior diet: dental soft diet/thin liquids prior to ICU transfer.      Subjective     Awake/alert    Pain/Comfort:  · Pain Rating 1: 0/10  · Pain Rating Post-Intervention 1: 0/10    Objective:     Oral Musculature Evaluation  · Oral Musculature: WFL  · Dentition: teeth in poor condition, scattered dentition  · Secretion Management: adequate  · Mucosal Quality: adequate  · Mandibular Strength and Mobility: WFL  · Oral Labial Strength and Mobility: WFL  · Lingual Strength and Mobility: WFL  · Buccal Strength and Mobility: WFL  · Volitional Cough: adequate  · Volitional Swallow: present  · Voice Prior to PO Intake: clear    Bedside Swallow Eval:   Consistencies Assessed:  · Thin liquids x6 oz straw  · Puree x1  · Solids x3     Oral Phase:   · WFL  · Prolonged mastication 2/2 scattered dentition    Pharyngeal Phase:   · no overt clinical signs/symptoms of aspiration    Compensatory Strategies  · None    Assessment:     Melania Malagon is a 73 y.o. female with an SLP diagnosis of Dysphagia.      Goals:   Multidisciplinary Problems     SLP Goals        Problem: SLP Goal    Goal Priority Disciplines Outcome   SLP Goal     SLP Ongoing  (interventions implemented as appropriate)   Description:  Speech Language Pathology Goals  Goals expected to be met by 12/8/18    1.  Pt will tolerate Dental soft diet w/ thin liquids w/ no overt s/s of aspiration.  2.  Pt will complete Speech Language Cognitive evaluation to determine the need for additional goals.                          Plan:     · Patient to be seen:  3 x/week   · Plan of Care expires:  12/30/18  · Plan of Care reviewed with:  patient, daughter   · SLP Follow-Up:  Yes       Discharge recommendations:  nursing facility, basic     Time Tracking:     SLP Treatment Date:   12/04/18  Speech Start Time:  1009  Speech Stop Time:  1017     Speech Total Time (min):  8 min    Billable Minutes: Eval Swallow and Oral Function 8    Chloe Bhatti CCC-SLP  12/04/2018

## 2018-12-04 NOTE — PLAN OF CARE
Problem: Patient Care Overview  Goal: Plan of Care Review  POC reviewed with pt and family at 1400. Pt verbalized understanding. Questions and concerns addressed. EEG d/c'd as no seizure activity noted.  Plan to stepdown to telemetry floor when available. Pt progressing toward goals. Will continue to monitor. See flowsheets for full assessment and VS info.

## 2018-12-04 NOTE — NURSING
73 y F transferred from [place] for cervical spine injury and seizure activity. She has pmh of HTN, DM, seizure disorder and had an unwitnessed seizure (presumed) at nursing home today followed by fall.    Patient is seen in ICU. She is incontinent of stool with large loose bowel movement noted. Bedside nurse assisted with cleansing. Patient Moisture dermatitis to the groins and gluteal cleft. No open areas noted just skin irritation. They had tried a rectal bag yesterday per nursing but it was not sucessful.     Patient cleansed and barrier antifungal applied.     Patient has barrier antifungal ordered and available in room.     Recommend:  q2hour turns   Barrier antifungal bid to the groins and inner thighs.  EHOB overlay    Wound care to follow PRN.  Georgia Joe RN CWCN CN   [phone]

## 2018-12-04 NOTE — ADDENDUM NOTE
Addendum  created 12/04/18 1151 by Jerman Guzmán MD    Intraprocedure Blocks edited, Sign clinical note

## 2018-12-04 NOTE — PROGRESS NOTES
Cardiology Progress Note  Attending Physician: Gagandeep Amezcua MD  Hospital Day: 13    Subjective:   Interval History: Patient seen and examined, no arrhythmias overnight. Extubated yesterday and has good verbal communications today.  Medications:   Continuous Infusions:   sodium chloride 0.9% 50 mL/hr at 12/04/18 0501       Scheduled Meds:   amiodarone  400 mg Per NG tube BID    aspirin  81 mg Per OG tube Daily    chlorhexidine  15 mL Mouth/Throat BID    famotidine  20 mg Per OG tube BID    ferrous sulfate  325 mg Oral BID    heparin (porcine)  5,000 Units Subcutaneous Q8H    levETIRAcetam  2,000 mg Per NG tube BID    levothyroxine  100 mcg Per NG tube Before breakfast    lorazepam  2 mg Intravenous Once    miconazole nitrate 2%   Topical (Top) BID    senna-docusate 8.6-50 mg  2 tablet Per NG tube BID    sodium chloride 0.9%  3 mL Intravenous Q8H     PRN Meds:acetaminophen, albuterol-ipratropium, dextrose 50%, fentaNYL, glucagon (human recombinant), insulin aspart U-100, magnesium oxide, magnesium oxide, miconazole nitrate 2%, ondansetron, potassium chloride 10%, potassium chloride 10%, potassium chloride 10%, potassium, sodium phosphates, potassium, sodium phosphates, potassium, sodium phosphates, sodium chloride 0.9%  Objective:     Vitals:  Temp:  [98.9 °F (37.2 °C)-100 °F (37.8 °C)]   Pulse:  [61-98]   Resp:  [12-33]   BP: (135-138)/(56-84)   SpO2:  [95 %-100 %]   Arterial Line BP: (116-179)/(51-76)   I/O's:    Intake/Output Summary (Last 24 hours) at 12/4/2018 1212  Last data filed at 12/4/2018 1205  Gross per 24 hour   Intake 1095.22 ml   Output 1185 ml   Net -89.78 ml        Constitutional: NAD, conversant  HEENT: Sclera anicteric, PERRLA, EOMI  Neck: Unable to visualize JVD, Unable to assess   CV: RRR, no murmur, normal S1/S2  Pulm: CTAB, no wheezes, rales, or ronchi  GI: Abdomen soft, NTND, +BS  Extremities: No LE edema, warm and well perfused  Skin: No ecchymosis, erythema, or  ulcers  Psych: AOx3, appropriate affect  Neuro: CNII-XII intact, no focal deficits    Labs:     Recent Labs   Lab 12/02/18  0145 12/03/18  0158  12/04/18  0225    140  --  138   K 3.7 3.8   < > 3.8    108  --  106   CO2 25 25  --  25   BUN 25* 23  --  13   CREATININE 0.8 1.1  --  1.0   * 106  --  156*   ANIONGAP 7* 7*  --  7*    < > = values in this interval not displayed.     Recent Labs   Lab 12/02/18  0145 12/03/18  0158 12/04/18  0225   AST 39 44* 38   ALT 17 19 16   ALKPHOS 57 63 62   BILITOT 1.0 0.6 0.8   ALBUMIN 2.1* 2.4* 2.3*      Recent Labs   Lab 12/02/18  0145 12/03/18  0158 12/04/18  0225   WBC 14.66* 10.61 13.35*   HGB 7.9* 8.4* 7.9*   HCT 27.2* 29.1* 27.1*    334 281   GRAN 73.6*  10.8* 60.5  6.4 69.2  9.2*     Recent Labs   Lab 11/28/18  1557 12/01/18  2347 12/03/18  0158   INR 1.1 1.3* 1.2     Recent Labs   Lab 12/01/18  2347 12/02/18  0905   TROPONINI 0.144* 0.261*      Micro:   Blood Cultures  Lab Results   Component Value Date    LABBLOO No growth after 5 days. 11/22/2018    LABBLOO No growth after 5 days. 11/22/2018     Urine Cultures  No results found for: LABURIN      Assessment&Plan:     VT:  - Continue on amiodarone.  - Ischemic evaluation prior to discharge (PET stress).  - Supportive care per primary team.   - Cardiology will sign off.    Thank you for your consult, please call cariology for any question.     Randi Harrison MD  Cardiology Fellow  Pager: [phone]

## 2018-12-04 NOTE — PROGRESS NOTES
[facility]  Cardiology  Progress Note    Patient Name: Melania Malagon  MRN: [id number]  Admission Date: 11/22/2018  Hospital Length of Stay: 11 days  Code Status: Full Code   Attending Physician: Gagandeep Amezcua MD   Primary Care Physician: Eugene Lorenzo MD  Expected Discharge Date:   Principal Problem:Cervical spine fracture    Subjective:     Hospital Course:   No notes on file    Interval History: patient remained intubated with minimal response and still connected to EEG.    Review of Systems   Unable to perform ROS: intubated     Objective:     Vital Signs (Most Recent):  Temp: 99.7 °F (37.6 °C) (12/03/18 1502)  Pulse: 94 (12/03/18 1728)  Resp: (!) 33 (12/03/18 1728)  BP: (!) 137/56 (12/03/18 1502)  SpO2: 100 % (12/03/18 1728) Vital Signs (24h Range):  Temp:  [98.2 °F (36.8 °C)-100.6 °F (38.1 °C)] 99.7 °F (37.6 °C)  Pulse:  [55-96] 94  Resp:  [3-33] 33  SpO2:  [99 %-100 %] 100 %  BP: (108-161)/(52-72) 137/56  Arterial Line BP: (116-174)/(46-72) 152/62     Weight: 92.5 kg (204 lb)  Body mass index is 36.14 kg/m².     SpO2: 100 %  O2 Device (Oxygen Therapy): ventilator      Intake/Output Summary (Last 24 hours) at 12/3/2018 1809  Last data filed at 12/3/2018 1702  Gross per 24 hour   Intake 384.66 ml   Output 450 ml   Net -65.34 ml       Lines/Drains/Airways     Central Venous Catheter Line                 Percutaneous Central Line Insertion/Assessment - triple lumen  12/03/18 1445 right femoral vein less than 1 day          Drain                 NG/OG Tube 12/01/18 2337 orogastric 1 day    Female External Urinary Catheter 12/03/18 0600 less than 1 day          Arterial Line                 Arterial Line 12/01/18 1156 Right Brachial 2 days          Peripheral Intravenous Line                 Peripheral IV - Single Lumen 12/03/18 0514 Left Forearm less than 1 day         Peripheral IV - Single Lumen 12/03/18 0515 Left Hand less than 1 day         Peripheral IV - Single Lumen 12/03/18 1054 Left  Upper Arm less than 1 day                Physical Exam    Significant Labs:   CMP   Recent Labs   Lab 12/01/18  2347 12/02/18  0145 12/03/18  0158 12/03/18  1518    141 140  --    K 3.2* 3.7 3.8 4.0    109 108  --    CO2 21* 25 25  --    * 123* 106  --    BUN 24* 25* 23  --    CREATININE 0.9 0.8 1.1  --    CALCIUM 8.0* 7.8* 8.0*  --    PROT 6.6 6.1 6.6  --    ALBUMIN 2.3* 2.1* 2.4*  --    BILITOT 0.9 1.0 0.6  --    ALKPHOS 68 57 63  --    AST 43* 39 44*  --    ALT 19 17 19  --    ANIONGAP 12 7* 7*  --    ESTGFRAFRICA >60.0 >60.0 58.4*  --    EGFRNONAA >60.0 >60.0 50.6*  --    , CBC   Recent Labs   Lab 12/02/18  0033 12/02/18  0145 12/03/18  0158   WBC 11.68 14.66* 10.61   HGB 8.0* 7.9* 8.4*   HCT 26.3* 27.2* 29.1*    329 334    and INR   Recent Labs   Lab 12/01/18  2347 12/03/18  0158   INR 1.3* 1.2       Significant Imaging:     EKG (12/02/2018): NSR with flattened TWI in the high lateral leads.    2D Echo (12/02/2018):  Left Ventricle Normal ejection fraction at 55%. Cavity is normal. Concentric remodeling observed. Indeterminate left ventricular diastolic function.   Right Ventricle Normal cavity size and ejection fraction.   Left Atrium Mild atrial enlargement. LEONARD is 37ml/m2   Aortic Valve There is mild of the aortic valve present. Aortic valve sclerosis is mild. Mild stenosis. Mobility is mildly reduced. RICKIE is 1.78 cm2; mean gradient is 16.32 mmHg; peak gradient is 25.60 mmHg; AV peak velocity is 2.53 m/s.   Mitral Valve Normal valve structure. Mild mitral sclerosis. Normal leaflet mobility.   Tricuspid Valve The tricuspid valve is normal. Mild regurgitation. Mobility is normal   IVC/SVC Inferior vena cava is not well visualized.   Ascending Aorta Normal aortic root, size and contour.   Pericardium No pericardial effusion. Pericardium is normal.         Assessment and Plan:     Cardiac arrest    --patient was evaluated while she was still intubated this morning, but she was  extubated by notes at around 5:30pm  --will re-evaluate patient tomorrow morning, and if suitable, we will discuss with Interventional Cardiology for LHC.     VT (ventricular tachycardia)    --continue amiodarone  --will discuss with interventional cardiology tomorrow if she appears to be an appropriate candidate tomorrow morning.         VTE Risk Mitigation (From admission, onward)        Ordered     heparin (porcine) injection 5,000 Units  Every 8 hours      11/30/18 0958     IP VTE HIGH RISK PATIENT  Once      11/22/18 1545          Nabila Martinez MD  Cardiology  [facility]

## 2018-12-04 NOTE — PROCEDURES
ICU EEG/VIDEO MONITORING REPORT    Melania Malagon  [id number]  1945    DATE OF SERVICE: 12/3-4/18    DATE OF ADMISSION: 11/22/2018  1:27 PM    ADMITTING PROVIDER: Ernesto Albarran MD    REASON FOR CONSULT: 74yo F admitted with seizures and AMS    METHODOLOGY   Electroencephalographic (EEG) recording is with electrodes placed according to the International 10-20 placement system.  Thirty two (32) channels of digital signal are simultaneously recorded from the scalp and may include EKG, EMG, and/or eye monitors.   Recording band pass was 0.1 to 512 hz.  Digital video recording of the patient is simultaneously recorded with the EEG.  The nursing staff report clinical symptoms and may press an event button when the patient has symptoms of clinical interest to the treating physicians.  EEG and video recording is stored and archived in digital format.  The entire recording is visually reviewed and the times identified by computer analysis as being spikes or seizures are reviewed again.  Activation procedures which include photic stimulation, hyperventilation and instructing patients to perform simple task are done in selected patients.   Compresses spectral analysis (CSA) is also performed on the activity recorded from each individual channel.  This is displayed as a power display of frequencies from 0 to 30 Hz over time.   The CSA analysis is done and displayed continuously.  This is reviewed for asymmetries in power between homologous areas of the scalp and for presence of changes in power which canbe seen when seizures occur.  Sections of suspected abnormalities on the CSA is then compared with the original EEG recording.     [employer] software was also utilized in the review of this study.  This software suite analyzes the EEG recording in multiple domains.  Coherence and rhythmicity is computed to identify EEG sections which may contain organized seizures.  Each channel undergoes analysis to detect presence of  spike and sharp waves which have special and morphological characteristic of epileptic activity.  The routine EEG recording is converted from spacial into frequency domain.  This is then displayed comparing homologous areas to identify areas of significant asymmetry.  Algorithm to identify non-cortically generated artifact is used to separate eye movement, EMG and other artifact from the EEG.      Recording Times  Start on 12/3/18, 07:00  Stop on 12/4/18, 07:00    A total of 24 hours of EEG was recorded.    EEG FINDINGS   Background activity:   The background rhythm was characterized by sharply contoured theta > delta (2-6 Hz) activity  No posterior dominant rhythm seen.   Symmetry and continuity: the background appeared continuous and asymmetric    Sleep:   Not seen    Activation procedures:   Not done    Abnormal activity:   Frequent to abundant generalized 1-2 Hz spike-polyspike and wave discharges are seen throughout the period of review without any evolution     IMPRESSION:   This is an abnormal C-EEG, due to:  1) frequent to abundant generalized epileptiform discharges seen, suggestive of primary generalized epilepsy  2) moderate to severe generalized slowing seen,suggestive of moderate to severe diffuse or multifocal cerebral dysfunction    CLINICAL CORRELATION IS RECOMMENDED.    Marti Tsai MD, VALORIE(), FACNS, TRUPTI.  Neurology-Epilepsy.  [facility].

## 2018-12-04 NOTE — PLAN OF CARE
Problem: SLP Goal  Goal: SLP Goal  Speech Language Pathology Goals  Goals expected to be met by 12/8/18    1.  Pt will tolerate Dental soft diet w/ thin liquids w/ no overt s/s of aspiration.  2.  Pt will complete Speech Language Cognitive evaluation to determine the need for additional goals.         Bedside swallow study completed. Recommend dental soft diet/thin liquids at this time.   Chloe Bhatti CCC-SLP  12/4/2018

## 2018-12-04 NOTE — ASSESSMENT & PLAN NOTE
Episode of torsade  Cardiology following  Maintain Mg >2  Monitor sanjiv  Now that extubated cardiology may proceed with ischemic workup

## 2018-12-04 NOTE — PLAN OF CARE
12/04/18 1413   Post-Acute Status   Post-Acute Authorization Placement   Post-Acute Placement Status Referrals Sent     SW reviewed chart for DC planning needs. Pt resident of [facility]. SW sent referral to them via [facility]LAZE.    Nabila Anguiano LMSW  Neurocritical Care   [facility]  [phone]

## 2018-12-04 NOTE — PROGRESS NOTES
Melania Malagon is a 73-year-old female with medical history of hypertension, diabetes, seizure disorder who was [place] [facility] [place] for cervical spine injury and seizure activity.  She was found to have tight 1 at less than type 2 odontoid fractures on imaging.  She was treated with a cervical collar at all times.    Discuss with Dr. Luevano regarding neurosurgery follow-up appointment scheduled for next Wednesday with me for hospital follow-up of cervical fractures.  Per [facility], patient is still currently admitted to [facility] and was recently extubated.  Per Dr. Luevano, neurosurgery hospital follow-up should be rescheduled to 6 weeks with CT C-spine prior.  Patient is to continue wearing her cervical collar at all times except for when showering.  She is to maintain spinal precautions when cervical collar off.  Fall precautions.  CT C-spine ordered.  Discussed with clinic nursing staff, they will contact patient and family to update them when the above plan of care.      Please call with any questions.    Discussed with Dr. Denis Terry Nguyen, PA-C [facility]

## 2018-12-04 NOTE — ASSESSMENT & PLAN NOTE
--patient was evaluated while she was still intubated this morning, but she was extubated by notes at around 5:30pm  --will re-evaluate patient tomorrow morning, and if suitable, we will discuss with Interventional Cardiology for C.

## 2018-12-04 NOTE — PROVIDER TRANSFER
Neuro Critical Care Transfer of Care note    Date of Admit: 11/22/2018  Date of Transfer / Stepdown: 12/4/2018    Brief History of Present Illness:      73 y F transferred from [place] for cervical spine injury and seizure activity. She has pmh of HTN, DM, seizure disorder and had an unwitnessed seizure (presumed) at nursing home today followed by fall. On arrival by EMS she was responding minimally. Did not require CPR. She had one seizure en route to hospital at [place] and received versed. At ER at OSH, patient had another witnessed seizure with tonic clonic activity in extremities and became unresponsive. She was intubated, and given iv fosphenytoin.  CTH was negative. CT c-spine showed odontoid fracture and C1 fracture. She received versed and fosphenytoin and OSH, when she was transferred by helicopter to [facility], en route she was started on ketamine gtt at 0.5 mcg/kg/min. She was transferred to [facility] for cervical fracture requiring neurosurg intervention and also seizure monitoring and control.      TO note  Patient had recent pneumonia and GI bleed earlier this month and was treated at OSH. She was discharged on 11/20 with levaquin for pneumonia and returned to NH     Hospital Course By Problem with Pertinent Findings:       1. Cervical spine fracture  MRI c-spine without cord compression, contusion or hematoma  CT c-spine with minimally displaced fractures of the posterior arch of the C1 vertebral body in keeping with type 1 Sidney fracture and Type 2 odontoid fracture  C-collar in place  Per NSGY no plans to surgically intervene at the moment  Strict C spine precautions   Follow up NSGY for recs as outpatient    2. Seizures  Stop EEG  Discussed with epilepsy   continue levetiracetam  No seizures currently on EEG    3. Restless/Agitated  Off seroquel  No pcdx  Monitor  Much improved    4. VF arrest vs unstable torsades  Appreciate cards recs  Continue amio  Needs cardiac workup once stable  Treating  seizures    5. VT  Episode of torsade  Cardiology following  Maintain Mg >2  Monitor sanjiv  Now that extubated cardiology may proceed with ischemic workup    6. Hypothyroid  Continue synthroid      Consultants and Procedures:     Consultants:  Nutrition  Cardiology  Dermatology  VN  NSGY  PM&R  Wound care  PT/OT/SLP    Procedures:    A line    Transfer Information:     Diet:  Dental soft    Physical Activity:  As tolerated    To Do / Pending Studies / Follow ups:  Cardiology w/u now stable and extubated  C-collar in place, C spine precautions, F/u NSGY outpatient  Continue Keppra  Maintain Mag >2    Bharati Dumont  Neuro Crtical Care

## 2018-12-04 NOTE — PROGRESS NOTES
[facility]  Neurocritical Care  Progress Note    Admit Date: 11/22/2018  Service Date: 12/04/2018  Length of Stay: 12    Subjective:     Chief Complaint: Cervical spine fracture    History of Present Illness: 73 y F transferred from [place] for cervical spine injury and seizure activity. She has pmh of HTN, DM, seizure disorder and had an unwitnessed seizure (presumed) at nursing home today followed by fall. On arrival by EMS she was responding minimally. Did not require CPR. She had one seizure en route to hospital at [place] and received versed. At ER at OSH, patient had another witnessed seizure with tonic clonic activity in extremities and became unresponsive. She was intubated, and given iv fosphenytoin.  CTH was negative. CT c-spine showed odontoid fracture and C1 fracture. She received versed and fosphenytoin and OSH, when she was transferred by helicopter to [facility], en route she was started on ketamine gtt at 0.5 mcg/kg/min. She was transferred to [facility] for cervical fracture requiring neurosurg intervention and also seizure monitoring and control.      TO note  Patient had recent pneumonia and GI bleed earlier this month and was treated at OSH. She was discharged on 11/20 with levaquin for pneumonia and returned to NH     Hospital Course: 11/22: admit to Neuro ICU for higher level of care. Neurosurgery consulted for C-spine fracture. MRI c-spine ordered. Continous EEG monitoring. Epilepsy consulted. Loaded keppra and maintenance keppra  11/26: seroquel, derm consult, discussion with NSGY regarding surgical plan, daughter updated at bedside  11/27: ECG, increase seroquel, Vtach this morning, then bradycardia, wean off precedex, consult nutrition for TF change, Husks x 3 days, obtain smaller MJcollar  11/28: weaning parameters again, stop vimpat, metoprolol, decrease keppra, wean precedex, anemia workup, bradycardia with hypotension event this morning  11/29: no cuff leak this morning, start  decadron then re-eval later  11/30: extubate today, start SQH, PT OT SLP  12/1: FRANCIS, Passed swallow eval today   12/2 Had a VF cardiac arrest and was intubated last night   12/3: EEG read, continue amio, replete Mg, wean to extubate today, place CVC for better access  12/4: extubated yesterday, SLP, PT, OT, stop EEG, send procal, step down to IMT    Review of Symptoms:   Constitutional: Denies fevers or chills.  ENT: no hearing difficulty, no visual changes  Pulmonary: Denies shortness of breath or cough.  Cardiology: Denies chest pain or palpitations.  GI: Denies abdominal pain or constipation.  Neurologic: Denies new weakness, headaches, or paresthesias.   : no dysuria  Musk: no muscle pain, no joint pain  Psych: no hallucinations    Physical Exam:  GA: Alert, mild distress from c-collar  HEENT: No scleral icterus or JVD.   Pulmonary: Clear to auscultation A/L. No wheezing, crackles, or rhonchi.  Cardiac: RRR S1 & S2 w/o rubs/murmurs/gallops.   Abdominal: Bowel sounds present x 4. No appreciable hepatosplenomegaly.  Skin: No jaundice, rashes, or visible lesions. Poor dentition, onychomycosis throughout all nailbeds  Pulses: 1+ Dp bilat    Neuro:  --sedation:none  --GCS: 15  --Mental Status: Aox3    --CN II-XII grossly intact.   --Pupils 3-->2mm, PERRL.   --brainstem: intact  --Motor: 4/5 throughout  --sensory: intact to soft touch and pain throughout  --Reflexes: not tested  --Gait: deferred    Recent Labs   Lab 12/04/18 0225   WBC 13.35*   RBC 3.28*   HGB 7.9*   HCT 27.1*      MCV 83   MCH 24.1*   MCHC 29.2*     Recent Labs   Lab 12/04/18  0225   CALCIUM 7.7*   PROT 6.4      K 3.8   CO2 25      BUN 13   CREATININE 1.0   ALKPHOS 62   ALT 16   AST 38   BILITOT 0.8     No results for input(s): PT, INR, APTT in the last 24 hours.  Vent Mode: Spont  Oxygen Concentration (%):  [40] 40  Resp Rate Total:  [14 br/min-25 br/min] 15 br/min  Vt Set:  [450 mL] 450 mL  PEEP/CPAP:  [5 cmH20] 5  cmH20  Pressure Support:  [10 cmH20] 10 cmH20  Mean Airway Pressure:  [7.4 cmH20-9.4 cmH20] 7.4 cmH20    Temp: 99 °F (37.2 °C)  Pulse: 76  Rhythm: junctional rhythm  BP: 135/84  MAP (mmHg): 84  CI (L/min/m2): 3.9 L/min/m2  SVI: 43  SVV: 10 %  Resp: (!) 25  SpO2: 100 %  O2 Device (Oxygen Therapy): nasal cannula w/ humidification    Temp  Min: 98.9 °F (37.2 °C)  Max: 100 °F (37.8 °C)  Pulse  Min: 61  Max: 98  BP  Min: 135/84  Max: 160/65  MAP (mmHg)  Min: 84  Max: 84  CI (L/min/m2)  Min: 2.6 L/min/m2  Max: 32 L/min/m2  SVI  Min: 37  Max: 43  SVV  Min: 5 %  Max: 14 %  Resp  Min: 12  Max: 33  SpO2  Min: 98 %  Max: 100 %  Oxygen Concentration (%)  Min: 40  Max: 40    12/03 0701 - 12/04 0700  In: 896 [I.V.:896]  Out: 1135 [Urine:1135]   Unmeasured Output  Urine Occurrence: 1  Stool Occurrence: 0  Emesis Occurrence: 0  Pad Count: 0    Nutrition Prescription Ordered  Current Diet Order: NPO  Nutrition Order Comments: TF held  Current Nutrition Support Formula Ordered: Other (Comment)(canceled)  Current Nutrition Support Rate Ordered: 0 (ml)  Current Nutrition Support Frequency Ordered: 0  Last Bowel Movement: 12/02/18    Body mass index is 36.14 kg/m².      I have personally reviewed all labs, imaging, and studies today      Assessment/Plan:     Neuro   * Cervical spine fracture    MRI c-spine without cord compression, contusion or hematoma  CT c-spine with minimally displaced fractures of the posterior arch of the C1 vertebral body in keeping with type 1 Sidney fracture and Type 2 odontoid fracture  C-collar in place  Per NSGY no plans to surgically intervene at the moment  Strict C spine precautions   Follow up NSGY for recs as outpatient     Seizures    Stop EEG  Discussed with epilepsy   continue levetiracetam  No seizures currently on EEG     Psychiatric   Restlessness and agitation    Off seroquel  No pcdx  Monitor  Much improved     Cardiac/Vascular   Cardiac arrest    VF arrest verses unstable  Torsades  Appreciate cards recs  Continue amio  Needs cardiac workup once stable  Treating seizures       VT (ventricular tachycardia)    Episode of torsade  Cardiology following  Maintain Mg >2  Monitor sanjiv  Now that extubated cardiology may proceed with ischemic workup     Endocrine   Hypothyroidism    Continue synthroid            The patient is being Prophylaxed for:  Venous Thromboembolism with: Chemical  Stress Ulcer with: Not Applicable   Ventilator Pneumonia with: not applicable    Activity Orders          None        Full Code    Gagandeep Amezcua MD  Neurocritical Care  [facility]    Cc time 36 minutes  Critical Care was time spent personally by me on the following activities: development of treatment plan with patient or surrogate and bedside caregivers, discussions with consultants, evaluation of patient's response to treatment, examination of patient, ordering and performing treatments and interventions, ordering and review of laboratory studies, ordering and review of radiographic studies, pulse oximetry, re-evaluation of patient's condition. This critical care time did not overlap with that of any other provider or involve time for any procedures.

## 2018-12-04 NOTE — PROGRESS NOTES
Bedside procedure in progress, not able to assess.    Will change order miconazole from powder to  ointment for thighs to protect from moisture/fungal infection.

## 2018-12-04 NOTE — ASSESSMENT & PLAN NOTE
MRI c-spine without cord compression, contusion or hematoma  CT c-spine with minimally displaced fractures of the posterior arch of the C1 vertebral body in keeping with type 1 Sidney fracture and Type 2 odontoid fracture  C-collar in place  Per NSGY no plans to surgically intervene at the moment  Strict C spine precautions   Follow up NSGY for recs as outpatient

## 2018-12-05 LAB
ALBUMIN SERPL BCP-MCNC: 2.3 G/DL
ALP SERPL-CCNC: 62 U/L
ALT SERPL W/O P-5'-P-CCNC: 14 U/L
ANION GAP SERPL CALC-SCNC: 7 MMOL/L
AST SERPL-CCNC: 29 U/L
BASOPHILS # BLD AUTO: 0.04 K/UL
BASOPHILS NFR BLD: 0.3 %
BILIRUB SERPL-MCNC: 0.9 MG/DL
BUN SERPL-MCNC: 8 MG/DL
CALCIUM SERPL-MCNC: 7.7 MG/DL
CHLORIDE SERPL-SCNC: 105 MMOL/L
CO2 SERPL-SCNC: 25 MMOL/L
CREAT SERPL-MCNC: 0.9 MG/DL
DIFFERENTIAL METHOD: ABNORMAL
EOSINOPHIL # BLD AUTO: 0.4 K/UL
EOSINOPHIL NFR BLD: 2.9 %
ERYTHROCYTE [DISTWIDTH] IN BLOOD BY AUTOMATED COUNT: 21.7 %
EST. GFR  (AFRICAN AMERICAN): >60 ML/MIN/1.73 M^2
EST. GFR  (NON AFRICAN AMERICAN): >60 ML/MIN/1.73 M^2
GLUCOSE SERPL-MCNC: 120 MG/DL
HCT VFR BLD AUTO: 27.7 %
HGB BLD-MCNC: 7.8 G/DL
IMM GRANULOCYTES # BLD AUTO: 0.04 K/UL
IMM GRANULOCYTES NFR BLD AUTO: 0.3 %
LYMPHOCYTES # BLD AUTO: 2.4 K/UL
LYMPHOCYTES NFR BLD: 17.3 %
MAGNESIUM SERPL-MCNC: 1.4 MG/DL
MCH RBC QN AUTO: 23.9 PG
MCHC RBC AUTO-ENTMCNC: 28.2 G/DL
MCV RBC AUTO: 85 FL
MONOCYTES # BLD AUTO: 1.6 K/UL
MONOCYTES NFR BLD: 11.6 %
NEUTROPHILS # BLD AUTO: 9.2 K/UL
NEUTROPHILS NFR BLD: 67.6 %
NRBC BLD-RTO: 0 /100 WBC
PHOSPHATE SERPL-MCNC: 1.9 MG/DL
PLATELET # BLD AUTO: 270 K/UL
PMV BLD AUTO: 10 FL
POCT GLUCOSE: 114 MG/DL (ref 70–110)
POCT GLUCOSE: 142 MG/DL (ref 70–110)
POCT GLUCOSE: 83 MG/DL (ref 70–110)
POCT GLUCOSE: 94 MG/DL (ref 70–110)
POTASSIUM SERPL-SCNC: 3.5 MMOL/L
PROT SERPL-MCNC: 6.5 G/DL
RBC # BLD AUTO: 3.27 M/UL
SODIUM SERPL-SCNC: 137 MMOL/L
WBC # BLD AUTO: 13.66 K/UL

## 2018-12-05 PROCEDURE — A4216 STERILE WATER/SALINE, 10 ML: HCPCS | Performed by: NURSE PRACTITIONER

## 2018-12-05 PROCEDURE — 25000003 PHARM REV CODE 250: Performed by: STUDENT IN AN ORGANIZED HEALTH CARE EDUCATION/TRAINING PROGRAM

## 2018-12-05 PROCEDURE — 25000003 PHARM REV CODE 250: Performed by: NURSE PRACTITIONER

## 2018-12-05 PROCEDURE — 99233 SBSQ HOSP IP/OBS HIGH 50: CPT | Mod: ,,, | Performed by: PSYCHIATRY & NEUROLOGY

## 2018-12-05 PROCEDURE — 94761 N-INVAS EAR/PLS OXIMETRY MLT: CPT

## 2018-12-05 PROCEDURE — 63600175 PHARM REV CODE 636 W HCPCS: Performed by: NURSE PRACTITIONER

## 2018-12-05 PROCEDURE — 83735 ASSAY OF MAGNESIUM: CPT

## 2018-12-05 PROCEDURE — 85025 COMPLETE CBC W/AUTO DIFF WBC: CPT

## 2018-12-05 PROCEDURE — 84100 ASSAY OF PHOSPHORUS: CPT

## 2018-12-05 PROCEDURE — 27000221 HC OXYGEN, UP TO 24 HOURS

## 2018-12-05 PROCEDURE — 25000003 PHARM REV CODE 250: Performed by: PSYCHIATRY & NEUROLOGY

## 2018-12-05 PROCEDURE — 92523 SPEECH SOUND LANG COMPREHEN: CPT

## 2018-12-05 PROCEDURE — 11000001 HC ACUTE MED/SURG PRIVATE ROOM

## 2018-12-05 PROCEDURE — 80053 COMPREHEN METABOLIC PANEL: CPT

## 2018-12-05 RX ORDER — LANOLIN ALCOHOL/MO/W.PET/CERES
800 CREAM (GRAM) TOPICAL
Status: DISCONTINUED | OUTPATIENT
Start: 2018-12-05 | End: 2018-12-11 | Stop reason: HOSPADM

## 2018-12-05 RX ORDER — SODIUM,POTASSIUM PHOSPHATES 280-250MG
2 POWDER IN PACKET (EA) ORAL
Status: DISCONTINUED | OUTPATIENT
Start: 2018-12-05 | End: 2018-12-11 | Stop reason: HOSPADM

## 2018-12-05 RX ORDER — ACETAMINOPHEN 325 MG/1
650 TABLET ORAL EVERY 6 HOURS PRN
Status: DISCONTINUED | OUTPATIENT
Start: 2018-12-05 | End: 2018-12-11 | Stop reason: HOSPADM

## 2018-12-05 RX ORDER — POTASSIUM CHLORIDE 20 MEQ/15ML
40 SOLUTION ORAL
Status: DISCONTINUED | OUTPATIENT
Start: 2018-12-05 | End: 2018-12-11 | Stop reason: HOSPADM

## 2018-12-05 RX ORDER — POTASSIUM CHLORIDE 20 MEQ/15ML
20 SOLUTION ORAL
Status: DISCONTINUED | OUTPATIENT
Start: 2018-12-05 | End: 2018-12-10

## 2018-12-05 RX ORDER — DULOXETIN HYDROCHLORIDE 60 MG/1
60 CAPSULE, DELAYED RELEASE ORAL DAILY
Status: DISCONTINUED | OUTPATIENT
Start: 2018-12-05 | End: 2018-12-11 | Stop reason: HOSPADM

## 2018-12-05 RX ADMIN — FENTANYL CITRATE 50 MCG: 50 INJECTION INTRAMUSCULAR; INTRAVENOUS at 03:12

## 2018-12-05 RX ADMIN — HEPARIN SODIUM 5000 UNITS: 5000 INJECTION, SOLUTION INTRAVENOUS; SUBCUTANEOUS at 09:12

## 2018-12-05 RX ADMIN — MICONAZOLE NITRATE: 20 OINTMENT TOPICAL at 09:12

## 2018-12-05 RX ADMIN — HEPARIN SODIUM 5000 UNITS: 5000 INJECTION, SOLUTION INTRAVENOUS; SUBCUTANEOUS at 01:12

## 2018-12-05 RX ADMIN — AMIODARONE HYDROCHLORIDE 400 MG: 200 TABLET ORAL at 08:12

## 2018-12-05 RX ADMIN — Medication 3 ML: at 05:12

## 2018-12-05 RX ADMIN — LEVETIRACETAM 2000 MG: 750 TABLET ORAL at 08:12

## 2018-12-05 RX ADMIN — DULOXETINE HYDROCHLORIDE 60 MG: 60 CAPSULE, DELAYED RELEASE ORAL at 01:12

## 2018-12-05 RX ADMIN — ASPIRIN 81 MG CHEWABLE TABLET 81 MG: 81 TABLET CHEWABLE at 08:12

## 2018-12-05 RX ADMIN — POTASSIUM & SODIUM PHOSPHATES POWDER PACK 280-160-250 MG 2 PACKET: 280-160-250 PACK at 05:12

## 2018-12-05 RX ADMIN — POTASSIUM CHLORIDE 40 MEQ: 20 SOLUTION ORAL at 05:12

## 2018-12-05 RX ADMIN — FERROUS SULFATE TAB EC 325 MG (65 MG FE EQUIVALENT) 325 MG: 325 (65 FE) TABLET DELAYED RESPONSE at 08:12

## 2018-12-05 RX ADMIN — AMIODARONE HYDROCHLORIDE 400 MG: 200 TABLET ORAL at 09:12

## 2018-12-05 RX ADMIN — STANDARDIZED SENNA CONCENTRATE AND DOCUSATE SODIUM 2 TABLET: 8.6; 5 TABLET, FILM COATED ORAL at 08:12

## 2018-12-05 RX ADMIN — Medication 3 ML: at 10:12

## 2018-12-05 RX ADMIN — Medication 3 ML: at 01:12

## 2018-12-05 RX ADMIN — LEVOTHYROXINE SODIUM 100 MCG: 100 TABLET ORAL at 05:12

## 2018-12-05 RX ADMIN — MAGNESIUM OXIDE TAB 400 MG (241.3 MG ELEMENTAL MG) 800 MG: 400 (241.3 MG) TAB at 05:12

## 2018-12-05 RX ADMIN — LEVETIRACETAM 2000 MG: 750 TABLET ORAL at 09:12

## 2018-12-05 RX ADMIN — ACETAMINOPHEN 650 MG: 325 TABLET ORAL at 05:12

## 2018-12-05 RX ADMIN — HEPARIN SODIUM 5000 UNITS: 5000 INJECTION, SOLUTION INTRAVENOUS; SUBCUTANEOUS at 05:12

## 2018-12-05 RX ADMIN — FAMOTIDINE 20 MG: 20 TABLET ORAL at 08:12

## 2018-12-05 RX ADMIN — FERROUS SULFATE TAB EC 325 MG (65 MG FE EQUIVALENT) 325 MG: 325 (65 FE) TABLET DELAYED RESPONSE at 09:12

## 2018-12-05 RX ADMIN — MICONAZOLE NITRATE: 20 OINTMENT TOPICAL at 08:12

## 2018-12-05 NOTE — PLAN OF CARE
Problem: Patient Care Overview  Goal: Plan of Care Review  Outcome: Ongoing (interventions implemented as appropriate)  Plan of care reviewed with Ms. Melania Malagon at 0400. Patient able to verbalize understanding and all questions and concerns addressed. No acute events overnight. Arterial line discontinued per order; line removed without complications. MIVF continued at 50cc/hr. Transfer to NSU pending. Patient progressing toward goals. VS and assessments per flowsheets; will continue to monitor

## 2018-12-05 NOTE — SUBJECTIVE & OBJECTIVE
Interval History: cardiology consult recommended, d/c senvicente villafanate, d/c arterial line, step down to IMT    Review of Systems   Constitutional: Negative for diaphoresis and fever.   HENT: Negative for facial swelling.    Eyes: Negative for photophobia.   Respiratory: Negative for cough, choking and chest tightness.    Cardiovascular: Negative for chest pain.   Gastrointestinal: Negative for abdominal distention and abdominal pain.   Genitourinary: Negative for difficulty urinating.   Neurological: Negative for headaches.       Objective:     Vitals:  Temp: 98.5 °F (36.9 °C)  Pulse: 78  Rhythm: normal sinus rhythm  BP: (!) 130/59  MAP (mmHg): 85  Resp: 17  SpO2: 96 %  O2 Device (Oxygen Therapy): nasal cannula w/ humidification    Temp  Min: 98.5 °F (36.9 °C)  Max: 100 °F (37.8 °C)  Pulse  Min: 63  Max: 91  BP  Min: 119/58  Max: 145/54  MAP (mmHg)  Min: 77  Max: 89  CI (L/min/m2)  Min: 3.2 L/min/m2  Max: 3.9 L/min/m2  SVI  Min: 41  Max: 46  SVV  Min: 13 %  Max: 21 %  Resp  Min: 12  Max: 28  SpO2  Min: 87 %  Max: 100 %    12/04 0701 - 12/05 0700  In: 1440 [P.O.:240; I.V.:1200]  Out: 1550 [Urine:1550]   Unmeasured Output  Urine Occurrence: 1  Stool Occurrence: 0  Emesis Occurrence: 0  Pad Count: 0       Physical Exam   Constitutional: She appears well-developed.   Eyes: Pupils are equal, round, and reactive to light.   Neck: Decreased range of motion present.   Cardiovascular: Normal rate and regular rhythm.   Pulmonary/Chest: Effort normal.   Abdominal: Normal appearance.   Neurological:   E4,V5,M6 GCS15   Cranial Nerves II - XII grossly intact   PERRLA   Reflexes and Coordination grossly intact      Medications:  Continuous Scheduled  amiodarone 400 mg BID   aspirin 81 mg Daily   DULoxetine 60 mg Daily   ferrous sulfate 325 mg BID   heparin (porcine) 5,000 Units Q8H   levETIRAcetam 2,000 mg BID   levothyroxine 100 mcg Before breakfast   lorazepam 2 mg Once   miconazole nitrate 2%  BID   sodium chloride 0.9% 3 mL Q8H    PRN  acetaminophen 650 mg Q6H PRN   albuterol-ipratropium 3 mL Q6H PRN   dextrose 50% 12.5 g PRN   fentaNYL 50 mcg Q2H PRN   glucagon (human recombinant) 1 mg PRN   insulin aspart U-100 1-10 Units Q6H PRN   magnesium oxide 800 mg PRN   magnesium oxide 800 mg PRN   miconazole nitrate 2%  BID PRN   ondansetron 4 mg Q8H PRN   potassium chloride 10% 20 mEq PRN   potassium chloride 10% 40 mEq PRN   potassium chloride 10% 40 mEq PRN   potassium, sodium phosphates 2 packet PRN   potassium, sodium phosphates 2 packet PRN   potassium, sodium phosphates 2 packet PRN   sodium chloride 0.9% 3 mL PRN     Today I personally reviewed pertinent medications, lines/drains/airways, imaging, cardiology results, laboratory results, microbiology results, notably:    Diet  Diet Dysphagia Mechanical Soft (IDDSI Level 5) [facility]  Diet Dysphagia Mechanical Soft (IDDSI Level 5) [facility]

## 2018-12-05 NOTE — NURSING TRANSFER
Nursing Transfer Note       Transfer To [facility]    Transfer via bed    Transfer with O2, cardiac monitoring    Transported by RN X2    Medicines sent: yes    Chart sent with patient: Yes    Notified: daughter    Bedside Neuro assessment performed: Yes    Bedside Handoff given to: FLORENCIO Amador    Upon arrival to floor: cardiac monitor applied, patient oriented to room, call bell in reach and bed in lowest position

## 2018-12-05 NOTE — PT/OT/SLP EVAL
Speech Language Pathology Evaluation  Cognitive Communication    Patient Name:  Melania Malagon   MRN:  [id number]  Admitting Diagnosis: Cervical spine fracture    Recommendations:     Recommendations:                General Recommendations:  Dysphagia therapy and Cognitive-linguistic therapy  Diet recommendations:  Dental Soft, Thin   Aspiration Precautions: Small bites/sips and Standard aspiration precautions   General Precautions: Standard, seizure, fall  Communication strategies:  none    History:     Past Medical History:   Diagnosis Date    Altered mental status 11/22/2018    Leukocytosis 11/22/2018    Seborrheic keratoses 11/26/2018    V-tach 11/27/2018       History reviewed. No pertinent surgical history.      Prior diet: regular/thin        Subjective     Awake yet lethargic  Requiring mod cues to remain alert throughout session    Pain/Comfort:  · Pain Rating 1: 0/10  · Pain Rating Post-Intervention 1: 0/10    Objective:   Cognitive Status:    Orientation Oriented x4  Problem Solving Conclusions 2/3 accy  and Categories 2/3 accy       Receptive Language:   Comprehension:   Questions Complex yes/no 100%  Commands: TBA    Expressive Language:  Verbal:    WFL  Nonverbal:   WFL      Motor Speech:  WFL    Voice:   WFL    Visual-Spatial:  TBA    Reading:   TBA     Written Expression:   TBA    Treatment: Pt tolerated grits x1, sausage x1, and thin liquids x4 oz via straw with decreased attention to bolus 2/2 lethargy. Pt required cues to remain alert throughout PO intake. Adequate oral clearance noted and no overt clinical signs of airway compromise noted. Continue soft diet/thin liquids with meds crushed at this time. Please assure pt alert for PO intake.     Assessment:   Melania Malagon is a 73 y.o. female with an SLP diagnosis of Dysphagia and Cognitive-Linguistic Impairment.      Goals:   Multidisciplinary Problems     SLP Goals        Problem: SLP Goal    Goal Priority Disciplines Outcome   SLP Goal     SLP  Ongoing (interventions implemented as appropriate)   Description:  Speech Language Pathology Goals  Goals expected to be met by 12/8/18    1.  Pt will tolerate Dental soft diet w/ thin liquids w/ no overt s/s of aspiration.  2.  Pt will complete Speech Language Cognitive evaluation to determine the need for additional goals. MET  3. Pt will complete simple problem solving task with 80% accy   4. Pt will participate in ongoing cognitive linguistic aspects                           Plan:   · Patient to be seen:  3 x/week   · Plan of Care expires:  12/30/18  · Plan of Care reviewed with:  patient   · SLP Follow-Up:  Yes       Discharge recommendations:  Discharge Facility/Level Of Care Needs: nursing facility, basic       Time Tracking:   SLP Treatment Date:   12/05/18  Speech Start Time:  0831  Speech Stop Time:  0840     Speech Total Time (min):  9 min    Billable Minutes: Ayleen Bhatti CCC-SLP  12/05/2018

## 2018-12-05 NOTE — ASSESSMENT & PLAN NOTE
Hx of VF arrest verses unstable Torsades  Cardiac recs following appreciated   Needs cardiac workup once stable  Plan stress test  Treating seizures

## 2018-12-06 LAB
ALBUMIN SERPL BCP-MCNC: 2.3 G/DL
ALP SERPL-CCNC: 61 U/L
ALT SERPL W/O P-5'-P-CCNC: 12 U/L
ANION GAP SERPL CALC-SCNC: 9 MMOL/L
AST SERPL-CCNC: 28 U/L
BASOPHILS # BLD AUTO: 0.05 K/UL
BASOPHILS NFR BLD: 0.6 %
BILIRUB SERPL-MCNC: 1 MG/DL
BUN SERPL-MCNC: 6 MG/DL
CALCIUM SERPL-MCNC: 8.2 MG/DL
CHLORIDE SERPL-SCNC: 106 MMOL/L
CO2 SERPL-SCNC: 23 MMOL/L
CREAT SERPL-MCNC: 0.8 MG/DL
DIFFERENTIAL METHOD: ABNORMAL
EOSINOPHIL # BLD AUTO: 0.5 K/UL
EOSINOPHIL NFR BLD: 5.3 %
ERYTHROCYTE [DISTWIDTH] IN BLOOD BY AUTOMATED COUNT: 21.3 %
EST. GFR  (AFRICAN AMERICAN): >60 ML/MIN/1.73 M^2
EST. GFR  (NON AFRICAN AMERICAN): >60 ML/MIN/1.73 M^2
GLUCOSE SERPL-MCNC: 81 MG/DL
HCT VFR BLD AUTO: 29.1 %
HGB BLD-MCNC: 8.2 G/DL
IMM GRANULOCYTES # BLD AUTO: 0.03 K/UL
IMM GRANULOCYTES NFR BLD AUTO: 0.3 %
LYMPHOCYTES # BLD AUTO: 2 K/UL
LYMPHOCYTES NFR BLD: 21.9 %
MAGNESIUM SERPL-MCNC: 1.4 MG/DL
MCH RBC QN AUTO: 24.6 PG
MCHC RBC AUTO-ENTMCNC: 28.2 G/DL
MCV RBC AUTO: 87 FL
MONOCYTES # BLD AUTO: 1.1 K/UL
MONOCYTES NFR BLD: 12 %
NEUTROPHILS # BLD AUTO: 5.3 K/UL
NEUTROPHILS NFR BLD: 59.9 %
NRBC BLD-RTO: 0 /100 WBC
PHOSPHATE SERPL-MCNC: 2 MG/DL
PLATELET # BLD AUTO: 268 K/UL
PMV BLD AUTO: 10.4 FL
POCT GLUCOSE: 113 MG/DL (ref 70–110)
POCT GLUCOSE: 124 MG/DL (ref 70–110)
POCT GLUCOSE: 93 MG/DL (ref 70–110)
POTASSIUM SERPL-SCNC: 3.6 MMOL/L
PROT SERPL-MCNC: 6.4 G/DL
RBC # BLD AUTO: 3.34 M/UL
SODIUM SERPL-SCNC: 138 MMOL/L
WBC # BLD AUTO: 8.89 K/UL

## 2018-12-06 PROCEDURE — 25000003 PHARM REV CODE 250: Performed by: NURSE PRACTITIONER

## 2018-12-06 PROCEDURE — 97164 PT RE-EVAL EST PLAN CARE: CPT

## 2018-12-06 PROCEDURE — 92526 ORAL FUNCTION THERAPY: CPT

## 2018-12-06 PROCEDURE — 63600175 PHARM REV CODE 636 W HCPCS: Performed by: NURSE PRACTITIONER

## 2018-12-06 PROCEDURE — 25000003 PHARM REV CODE 250: Performed by: PSYCHIATRY & NEUROLOGY

## 2018-12-06 PROCEDURE — 11000001 HC ACUTE MED/SURG PRIVATE ROOM

## 2018-12-06 PROCEDURE — 99232 SBSQ HOSP IP/OBS MODERATE 35: CPT | Mod: ,,, | Performed by: INTERNAL MEDICINE

## 2018-12-06 PROCEDURE — 85025 COMPLETE CBC W/AUTO DIFF WBC: CPT

## 2018-12-06 PROCEDURE — 83735 ASSAY OF MAGNESIUM: CPT

## 2018-12-06 PROCEDURE — 36415 COLL VENOUS BLD VENIPUNCTURE: CPT

## 2018-12-06 PROCEDURE — 97530 THERAPEUTIC ACTIVITIES: CPT

## 2018-12-06 PROCEDURE — A4216 STERILE WATER/SALINE, 10 ML: HCPCS | Performed by: NURSE PRACTITIONER

## 2018-12-06 PROCEDURE — 84100 ASSAY OF PHOSPHORUS: CPT

## 2018-12-06 PROCEDURE — 92507 TX SP LANG VOICE COMM INDIV: CPT

## 2018-12-06 PROCEDURE — 80053 COMPREHEN METABOLIC PANEL: CPT

## 2018-12-06 RX ADMIN — AMIODARONE HYDROCHLORIDE 400 MG: 200 TABLET ORAL at 09:12

## 2018-12-06 RX ADMIN — AMIODARONE HYDROCHLORIDE 400 MG: 200 TABLET ORAL at 10:12

## 2018-12-06 RX ADMIN — HEPARIN SODIUM 5000 UNITS: 5000 INJECTION, SOLUTION INTRAVENOUS; SUBCUTANEOUS at 07:12

## 2018-12-06 RX ADMIN — LEVOTHYROXINE SODIUM 100 MCG: 100 TABLET ORAL at 07:12

## 2018-12-06 RX ADMIN — FERROUS SULFATE TAB EC 325 MG (65 MG FE EQUIVALENT) 325 MG: 325 (65 FE) TABLET DELAYED RESPONSE at 10:12

## 2018-12-06 RX ADMIN — HEPARIN SODIUM 5000 UNITS: 5000 INJECTION, SOLUTION INTRAVENOUS; SUBCUTANEOUS at 10:12

## 2018-12-06 RX ADMIN — HEPARIN SODIUM 5000 UNITS: 5000 INJECTION, SOLUTION INTRAVENOUS; SUBCUTANEOUS at 01:12

## 2018-12-06 RX ADMIN — MICONAZOLE NITRATE: 20 OINTMENT TOPICAL at 09:12

## 2018-12-06 RX ADMIN — ACETAMINOPHEN 650 MG: 325 TABLET ORAL at 07:12

## 2018-12-06 RX ADMIN — MICONAZOLE NITRATE: 20 OINTMENT TOPICAL at 11:12

## 2018-12-06 RX ADMIN — DULOXETINE HYDROCHLORIDE 60 MG: 60 CAPSULE, DELAYED RELEASE ORAL at 09:12

## 2018-12-06 RX ADMIN — FERROUS SULFATE TAB EC 325 MG (65 MG FE EQUIVALENT) 325 MG: 325 (65 FE) TABLET DELAYED RESPONSE at 09:12

## 2018-12-06 RX ADMIN — Medication 3 ML: at 01:12

## 2018-12-06 RX ADMIN — LEVETIRACETAM 2000 MG: 750 TABLET ORAL at 10:12

## 2018-12-06 RX ADMIN — ASPIRIN 81 MG CHEWABLE TABLET 81 MG: 81 TABLET CHEWABLE at 09:12

## 2018-12-06 RX ADMIN — LEVETIRACETAM 2000 MG: 750 TABLET ORAL at 09:12

## 2018-12-06 NOTE — PROGRESS NOTES
[facility]  Neurocritical Care  Progress Note    Admit Date: 11/22/2018  Service Date: 12/05/2018  Length of Stay: 13    Subjective:     Chief Complaint: Cervical spine fracture    History of Present Illness: 73 y F transferred from [place] for cervical spine injury and seizure activity. She has pmh of HTN, DM, seizure disorder and had an unwitnessed seizure (presumed) at nursing home today followed by fall. On arrival by EMS she was responding minimally. Did not require CPR. She had one seizure en route to hospital at [place] and received versed. At ER at OSH, patient had another witnessed seizure with tonic clonic activity in extremities and became unresponsive. She was intubated, and given iv fosphenytoin.  CTH was negative. CT c-spine showed odontoid fracture and C1 fracture. She received versed and fosphenytoin and OSH, when she was transferred by helicopter to [facility], en route she was started on ketamine gtt at 0.5 mcg/kg/min. She was transferred to [facility] for cervical fracture requiring neurosurg intervention and also seizure monitoring and control.      TO note  Patient had recent pneumonia and GI bleed earlier this month and was treated at OSH. She was discharged on 11/20 with levaquin for pneumonia and returned to NH     Hospital Course: 11/22: admit to Neuro ICU for higher level of care. Neurosurgery consulted for C-spine fracture. MRI c-spine ordered. Continous EEG monitoring. Epilepsy consulted. Loaded keppra and maintenance keppra  11/26: seroquel, derm consult, discussion with NSGY regarding surgical plan, daughter updated at bedside  11/27: ECG, increase seroquel, Vtach this morning, then bradycardia, wean off precedex, consult nutrition for TF change, Husks x 3 days, obtain smaller MJcollar  11/28: weaning parameters again, stop vimpat, metoprolol, decrease keppra, wean precedex, anemia workup, bradycardia with hypotension event this morning  11/29: no cuff leak this morning, start  decadron then re-eval later  11/30: extubate today, start SQH, PT OT SLP  12/1: FRANCIS, Passed swallow eval today   12/2 Had a VF cardiac arrest and was intubated last night   12/3: EEG read, continue amio, replete Mg, wean to extubate today, place CVC for better access  12/4: extubated yesterday, SLP, PT, OT, stop EEG, send procal, step down to IMT  12/5: step down to IMT    Interval History: cardiology consult recommended, d/c senna docusate, d/c arterial line, step down to IMT    Review of Systems   Constitutional: Negative for diaphoresis and fever.   HENT: Negative for facial swelling.    Eyes: Negative for photophobia.   Respiratory: Negative for cough, choking and chest tightness.    Cardiovascular: Negative for chest pain.   Gastrointestinal: Negative for abdominal distention and abdominal pain.   Genitourinary: Negative for difficulty urinating.   Neurological: Negative for headaches.       Objective:     Vitals:  Temp: 98.5 °F (36.9 °C)  Pulse: 78  Rhythm: normal sinus rhythm  BP: (!) 130/59  MAP (mmHg): 85  Resp: 17  SpO2: 96 %  O2 Device (Oxygen Therapy): nasal cannula w/ humidification    Temp  Min: 98.5 °F (36.9 °C)  Max: 100 °F (37.8 °C)  Pulse  Min: 63  Max: 91  BP  Min: 119/58  Max: 145/54  MAP (mmHg)  Min: 77  Max: 89  CI (L/min/m2)  Min: 3.2 L/min/m2  Max: 3.9 L/min/m2  SVI  Min: 41  Max: 46  SVV  Min: 13 %  Max: 21 %  Resp  Min: 12  Max: 28  SpO2  Min: 87 %  Max: 100 %    12/04 0701 - 12/05 0700  In: 1440 [P.O.:240; I.V.:1200]  Out: 1550 [Urine:1550]   Unmeasured Output  Urine Occurrence: 1  Stool Occurrence: 0  Emesis Occurrence: 0  Pad Count: 0       Physical Exam   Constitutional: She appears well-developed.   Eyes: Pupils are equal, round, and reactive to light.   Neck: Decreased range of motion present.   Cardiovascular: Normal rate and regular rhythm.   Pulmonary/Chest: Effort normal.   Abdominal: Normal appearance.   Neurological:   E4,V5,M6 GCS15   Cranial Nerves II - XII grossly intact    PERRLA   Reflexes and Coordination grossly intact      Medications:  Continuous Scheduled  amiodarone 400 mg BID   aspirin 81 mg Daily   DULoxetine 60 mg Daily   ferrous sulfate 325 mg BID   heparin (porcine) 5,000 Units Q8H   levETIRAcetam 2,000 mg BID   levothyroxine 100 mcg Before breakfast   lorazepam 2 mg Once   miconazole nitrate 2%  BID   sodium chloride 0.9% 3 mL Q8H   PRN  acetaminophen 650 mg Q6H PRN   albuterol-ipratropium 3 mL Q6H PRN   dextrose 50% 12.5 g PRN   fentaNYL 50 mcg Q2H PRN   glucagon (human recombinant) 1 mg PRN   insulin aspart U-100 1-10 Units Q6H PRN   magnesium oxide 800 mg PRN   magnesium oxide 800 mg PRN   miconazole nitrate 2%  BID PRN   ondansetron 4 mg Q8H PRN   potassium chloride 10% 20 mEq PRN   potassium chloride 10% 40 mEq PRN   potassium chloride 10% 40 mEq PRN   potassium, sodium phosphates 2 packet PRN   potassium, sodium phosphates 2 packet PRN   potassium, sodium phosphates 2 packet PRN   sodium chloride 0.9% 3 mL PRN     Today I personally reviewed pertinent medications, lines/drains/airways, imaging, cardiology results, laboratory results, microbiology results, notably:    Diet  Diet Dysphagia Mechanical Soft (IDDSI Level 5) [facility]  Diet Dysphagia Mechanical Soft (IDDSI Level 5) [facility]        Assessment/Plan:     Neuro   * Cervical spine fracture    MRI c-spine without cord compression, contusion or hematoma  CT c-spine with minimally displaced fractures of the posterior arch of the C1 vertebral body in keeping with type 1 [place] fracture and Type 2 odontoid fracture  C-collar in place  Per NSGY no plans to surgically intervene at the moment  Strict C spine precautions   Follow up NSGY for recs as outpatient     Altered mental status    Likely due to seizure activity.   EEG monitoring stopped  keppra load and maintenance keppra.        Seizures    Stop EEG  Discussed with epilepsy   continue levetiracetam  No seizures currently on EEG      Cardiac/Vascular   Cardiac arrest    Hx of VF arrest verses unstable Torsades  Cardiac recs following appreciated   Needs cardiac workup once stable  Plan stress test  Treating seizures       Renal/   Hypophosphatemia    Replete daily prn     Endocrine   Hypothyroidism    Continue synthroid      GI   Dysphagia    - Swallow Test passed  - diet restarted       Other   Endotracheally intubated    Pt tolerating extubation (12/4)  Monitor CXR, ABGs                The patient is being Prophylaxed for:  Venous Thromboembolism with: Mechanical or Chemical  Stress Ulcer with: None  Ventilator Pneumonia with: not applicable    Activity Orders          None        Full Code    ANI CampbellC  Neurocritical Care  [facility]

## 2018-12-06 NOTE — PT/OT/SLP RE-EVAL
"Physical Therapy Re-evaluation    Patient Name:  Melania Malagon   MRN:  [id number]    Recommendations:     Discharge Recommendations:  nursing facility, basic, home health PT   Discharge Equipment Recommendations: none   Barriers to discharge: None    Assessment:     Melania Malagon is a 73 y.o. female admitted with a medical diagnosis of Cervical spine fracture.  She presents with the following impairments/functional limitations:  weakness, impaired endurance, impaired self care skills, gait instability, impaired functional mobilty, impaired balance, decreased lower extremity function, decreased upper extremity function, impaired cardiopulmonary response to activity. Pt with intermittent nausea throughout session, requiring frequent rest breaks to calm stomach. She exhibits improvement with edge of bed balance and standing/stepping activities, gait not yet initiated. Per pt report, prior to admit, she was walking with therapy in nursing home. She would benefit from continued physical therapy in nursing home to assist in improving her overall mobility.      Rehab Prognosis:  good; patient would benefit from acute skilled PT services to address these deficits and reach maximum level of function.      Recent Surgery: * No surgery found *      Plan:     During this hospitalization, patient to be seen 3 x/week to address the above listed problems via gait training, therapeutic activities, therapeutic exercises, neuromuscular re-education  · Plan of Care Expires:  01/05/19   Plan of Care Reviewed with: patient    Subjective     Communicated with nsg prior to session.  Patient found supine upon PT entry to room, agreeable to evaluation.      Chief Complaint: fatigue  Patient comments/goals: "I feel nauseated off and on"  Pain/Comfort:  · Pain Rating 1: 0/10    Patients cultural, spiritual, Yarsani conflicts given the current situation: none stated      Objective:     Patient found with: telemetry, peripheral IV, PureWick, " bed alarm(femoral line)     General Precautions: Standard, fall, seizure   Orthopedic Precautions:spinal precautions   Braces: Cervical collar     Exams:  · Cognitive Exam:  Patient is oriented to Person, Place, Time and Situation  · Postural Exam:  Patient presented with the following abnormalities:    · -       Rounded shoulders  · Sensation:    · -       Intact light touch B LE  · Skin Integrity/Edema:      · -       Edema: None noted B LE  · RLE ROM: WFL  · RLE Strength: WFL  · LLE ROM: WFL  · LLE Strength: WFL    Functional Mobility:  · Bed Mobility:     · Scooting, edge of bed: minimum assistance  · Supine to Sit: minimum assistance  · Sit to Supine: moderate assistance  · Transfers:     · Sit to Stand:  contact guard assistance with rolling walker  · Gait: pt executed 2 side steps L/R c/RW and CGA; she required assistance c/RW placement and demonstrated minimal weight-shifting  · Balance:   · Seated at edge of bed: approx 10 min c/min A - SBA; nausea upon reaching seated position, instances of posterior trunk lean, use of UE for support, no major instability noted  · Static standing balance: approx 1 min c/RW and CGA; nausea upon reaching standing position, increased use of UE for support/balance, instances of mild LE trembling at end of trial, no knee buckling    AM-PAC 6 CLICK MOBILITY  Total Score:14       Therapeutic Activities and Exercises:  Seated LE strengthening at edge of bed, 10x B   -ankle pumps   -LAQ    Patient left supine with all lines intact, call button in reach and bed alarm on.    GOALS:   Multidisciplinary Problems     Physical Therapy Goals        Problem: Physical Therapy Goal    Goal Priority Disciplines Outcome Goal Variances Interventions   Physical Therapy Goal     PT, PT/OT Ongoing (interventions implemented as appropriate)     Description:  Goals to be met by: 10 days ()    Patient will increase functional independence with mobility by performin. Supine to sit with  Stand-by Assistance  2. Sit to supine with Stand-by Assistance  3. Sit to stand transfer with Stand-by Assistance  4. Gait  x 20 feet with Moderate Assistance using Rolling Walker.   5. Lower extremity exercise program x30 reps per handout, with supervision to assist with improvement of muscular strength                          History:     Past Medical History:   Diagnosis Date    Altered mental status 11/22/2018    Leukocytosis 11/22/2018    Seborrheic keratoses 11/26/2018    V-tach 11/27/2018       History reviewed. No pertinent surgical history.    Clinical Decision Making:     History  Co-morbidities and personal factors that may impact the plan of care Examination  Body Structures and Functions, activity limitations and participation restrictions that may impact the plan of care Clinical Presentation   Decision Making/ Complexity Score   Co-morbidities:   [] Time since onset of injury / illness / exacerbation  [] Status of current condition  []Patient's cognitive status and safety concerns    [] Multiple Medical Problems (see med hx)  Personal Factors:   [] Patient's age  [] Prior Level of function   [] Patient's home situation (environment and family support)  [] Patient's level of motivation  [] Expected progression of patient      HISTORY:(criteria)    [] 07232 - no personal factors/history    [] 89993 - has 1-2 personal factor/comorbidity     [] 19257 - has >3 personal factor/comorbidity     Body Regions:  [] Objective examination findings  [] Head     []  Neck  [] Trunk   [] Upper Extremity  [] Lower Extremity    Body Systems:  [] For communication ability, affect, cognition, language, and learning style: the assessment of the ability to make needs known, consciousness, orientation (person, place, and time), expected emotional /behavioral responses, and learning preferences (eg, learning barriers, education  needs)  [] For the neuromuscular system: a general assessment of gross coordinated movement  (eg, balance, gait, locomotion, transfers, and transitions) and motor function  (motor control and motor learning)  [] For the musculoskeletal system: the assessment of gross symmetry, gross range of motion, gross strength, height, and weight  [] For the integumentary system: the assessment of pliability(texture), presence of scar formation, skin color, and skin integrity  [] For cardiovascular/pulmonary system: the assessment of heart rate, respiratory rate, blood pressure, and edema     Activity limitations:    [] Patient's cognitive status and saf ety concerns          [] Status of current condition      [] Weight bearing restriction  [] Cardiopulmunary Restriction    Participation Restrictions:   [] Goals and goal agreement with the patient     [] Rehab potential (prognosis) and probable outcome      Examination of Body System: (criteria)    [] 33402 - addressing 1-2 elements    [] 79712 - addressing a total of 3 or more elements     [] 07879 -  Addressing a total of 4 or more elements         Clinical Presentation: (criteria)  Choose one     On examination of body system using standardized tests and measures patient presents with (CHOOSE ONE) elements from any of the following: body structures and functions, activity limitations, and/or participation restrictions.  Leading to a clinical presentation that is considered (CHOOSE ONE)                              Clinical Decision Making  (Eval Complexity):  Choose One     Time Tracking:     PT Received On: 12/06/18  PT Start Time: 1318     PT Stop Time: 1338  PT Total Time (min): 20 min     Billable Minutes: Re-eval 10 min and Therapeutic Activity 10 min      Ava Ojeda, SPT  12/06/2018

## 2018-12-06 NOTE — PT/OT/SLP PROGRESS
Speech Language Pathology Treatment    Patient Name:  Melania Malagon   MRN:  [id number]  Admitting Diagnosis: Cervical spine fracture    Recommendations:                 General Recommendations:  Dysphagia therapy and Speech/language therapy  Diet recommendations:  Dental Soft, Liquid Diet Level: Thin   Aspiration Precautions: 1 bite/sip at a time, Assistance with meals, Feed only when awake/alert, HOB to 90 degrees and Small bites/sips   General Precautions: Standard, fall, seizure  Communication strategies:  none    Subjective     Awake/alert    Pain/Comfort:  · Pain Rating 1: 0/10  · Pain Rating Post-Intervention 1: 0/10    Objective:     Has the patient been evaluated by SLP for swallowing?   Yes  Keep patient NPO? No   Current Respiratory Status: nasal cannula      Pt repositioned upright in bed for breakfast. Pt tolerated grits x4, eggs x2 and 4 oz thin liquids via straw with timely swallow initiation and no overt clinical s/s of airway compromise. SLP reviewed swallow precautions with pt who verbalized understanding. Pt recalled 2/3 items ind'ly increasing to 3/3 with cue after a short delay. Compare/contrast task completed with 100% accy provided occ cues. Continue POC at this time, all goals remain appropriate at this time.     Assessment:     Melania Malagon is a 73 y.o. female with an SLP diagnosis of Dysphagia and Cognitive-Linguistic Impairment.   Goals:   Multidisciplinary Problems     SLP Goals        Problem: SLP Goal    Goal Priority Disciplines Outcome   SLP Goal     SLP Ongoing (interventions implemented as appropriate)   Description:  Speech Language Pathology Goals  Goals expected to be met by 12/8/18    1.  Pt will tolerate Dental soft diet w/ thin liquids w/ no overt s/s of aspiration.  2.  Pt will complete Speech Language Cognitive evaluation to determine the need for additional goals. MET  3. Pt will complete simple problem solving task with 80% accy   4. Pt will participate in ongoing cognitive  linguistic aspects                           Plan:     · Patient to be seen:  3 x/week   · Plan of Care expires:  12/30/18  · Plan of Care reviewed with:  patient   · SLP Follow-Up:  Yes       Discharge recommendations:  nursing facility, basic       Time Tracking:     SLP Treatment Date:   12/06/18  Speech Start Time:  0920  Speech Stop Time:  0936     Speech Total Time (min):  16 min    Billable Minutes: Speech Therapy Individual 8 and Treatment Swallowing Dysfunction 8    Chloe Bhatti CCC-SLP  12/06/2018

## 2018-12-06 NOTE — MEDICAL/APP STUDENT
[facility] Medicine  Progress note    Team: [facility]OS[facility] MED B Jose Najera  Admit Date: 11/22/2018  PASTOR 12/10/2018  Code status: Full Code    Principal Problem:  Cervical spine fracture    Interval hx:      ROS   Constitutional: Negative for diaphoresis and fever.   HENT: Negative for facial swelling.    Eyes: Negative for photophobia.   Respiratory: Negative for cough, choking and chest tightness.    Cardiovascular: Negative for chest pain.   Gastrointestinal: Negative for abdominal distention and abdominal pain.   Genitourinary: Negative for difficulty urinating.   Neurological: Negative for headaches.       PEx  Temp:  [96.7 °F (35.9 °C)-98.5 °F (36.9 °C)]   Pulse:  [64-81]   Resp:  [16-20]   BP: (124-193)/(58-75)   SpO2:  [94 %-97 %]     Intake/Output Summary (Last 24 hours) at 12/6/2018 1515  Last data filed at 12/6/2018 0500  Gross per 24 hour   Intake --   Output 550 ml   Net -550 ml     Constitutional: She appears well-developed.   Eyes: Pupils are equal, round, and reactive to light.   Neck: Decreased range of motion present.   Cardiovascular: Normal rate and regular rhythm.   Pulmonary/Chest: Effort normal.   Abdominal: Normal appearance.   Neurological:   E4,V5,M6 GCS15   Cranial Nerves II - XII grossly intact   PERRLA   Reflexes and Coordination grossly intact        Recent Labs   Lab 12/04/18 0225 12/05/18  0109 12/06/18  0433   WBC 13.35* 13.66* 8.89   HGB 7.9* 7.8* 8.2*   HCT 27.1* 27.7* 29.1*    270 268     Recent Labs   Lab 12/04/18 0225 12/05/18  0109 12/06/18  0433    137 138   K 3.8 3.5 3.6    105 106   CO2 25 25 23   BUN 13 8 6*   CREATININE 1.0 0.9 0.8   * 120* 81   CALCIUM 7.7* 7.7* 8.2*   MG 1.5* 1.4* 1.4*   PHOS 2.2* 1.9* 2.0*     Recent Labs   Lab 12/01/18  2347  12/03/18  0158 12/04/18 0225 12/05/18  0109 12/06/18  0433   ALKPHOS 68   < > 63 62 62 61   ALT 19   < > 19 16 14 12   AST 43*   < > 44* 38 29 28   ALBUMIN 2.3*   < > 2.4* 2.3* 2.3* 2.3*   PROT 6.6   < >  6.6 6.4 6.5 6.4   BILITOT 0.9   < > 0.6 0.8 0.9 1.0   INR 1.3*  --  1.2  --   --   --     < > = values in this interval not displayed.      Recent Labs   Lab 12/04/18  2330 12/05/18  0517 12/05/18  1238 12/05/18  1738 12/05/18  2101 12/06/18  1130   POCTGLUCOSE 117* 142* 114* 94 83 124*     No results for input(s): CPK, CPKMB, MB, TROPONINI in the last 72 hours.    Scheduled Meds:   amiodarone  400 mg Oral BID    aspirin  81 mg Oral Daily    DULoxetine  60 mg Oral Daily    ferrous sulfate  325 mg Oral BID    heparin (porcine)  5,000 Units Subcutaneous Q8H    levETIRAcetam  2,000 mg Oral BID    levothyroxine  100 mcg Oral Before breakfast    lorazepam  2 mg Intravenous Once    miconazole nitrate 2%   Topical (Top) BID    sodium chloride 0.9%  3 mL Intravenous Q8H     Continuous Infusions:  As Needed:  acetaminophen, albuterol-ipratropium, dextrose 50%, glucagon (human recombinant), insulin aspart U-100, magnesium oxide, magnesium oxide, miconazole nitrate 2%, ondansetron, potassium chloride 10%, potassium chloride 10%, potassium chloride 10%, potassium, sodium phosphates, potassium, sodium phosphates, potassium, sodium phosphates, sodium chloride 0.9%    Active Hospital Problems    Diagnosis  POA    *Cervical spine fracture [S12.9XXA]  Yes    Intertrigo [L30.4]  Yes    Cardiac arrest [I46.9]  No    Hypothyroidism [E03.9]  Yes    Dysphagia [R13.10]  Yes    Anemia of chronic disease [D63.8]  Yes    VT (ventricular tachycardia) [I47.2]  No    Hypophosphatemia [E83.39]  Yes    Restlessness and agitation [R45.1]  Yes    Endotracheally intubated [Z97.8]  Yes    [place] coma scale total score 9-12 [R40.2420]  Yes    Seizures [R56.9]  Yes             Altered mental status [R41.82]  Yes      Resolved Hospital Problems    Diagnosis Date Resolved POA    Bradycardia [R00.1] 11/30/2018 No    Seborrheic keratoses [L82.1] 11/30/2018 Yes    Nipple crusting [N64.89] 11/28/2018 Yes    Leukocytosis [D72.829]  11/30/2018 Yes       Overview  73 y F transferred from [place] for cervical spine injury and seizure activity. She has pmh of HTN, DM, seizure disorder and had an unwitnessed seizure (presumed) at nursing home today followed by fall. On arrival by EMS she was responding minimally. Patient to Neuro ICU on admit for high level of care, Neurosurgery consulted and epilepsy consulted. Seoquel added with derm consultation as well. Keppra load given for seizure precautions/ppx. No surgical intervention at the moment needed per NSGY. Patient extubated appropriately and SLP, PT, OT were consulted. EEG stopped and patient stepped down to internal medicine. Continued C-collar precautions.     Assessment and Plan for Problems addressed today:    Cervical spine fracture     MRI c-spine without cord compression, contusion or hematoma  CT c-spine with minimally displaced fractures of the posterior arch of the C1 vertebral body in keeping with type 1 Sidney fracture and Type 2 odontoid fracture  C-collar in place  Per NSGY no plans to surgically intervene at the moment  Follow up NSGY for recs as outpatient      Altered mental status     Likely due to seizure activity.   EEG monitoring stopped  keppra load and maintenance keppra.          Seizures     Stop EEG  Discussed with epilepsy   continue levetiracetam  No seizures currently on EEG         Cardiac arrest     Hx of VF arrest verses unstable Torsades  Cardiac recs following appreciated   Needs cardiac workup once stable  Plan stress test  Treating seizures            Hypophosphatemia     Replete daily prn         Hypothyroidism     Continue synthroid          Dysphagia     - Swallow Test passed  - diet restarted              The patient is being Prophylaxed for:  Venous Thromboembolism with: Mechanical or Chemical    Discharge plan and follow up    Provider    I personally scribed for Zoran Morillo MD on 12/06/2018 at 3:20 PM. Electronically signed by ana Garcia  Reinaldo III on 12/06/2018 at 3:20 PM

## 2018-12-06 NOTE — PLAN OF CARE
Problem: Physical Therapy Goal  Goal: Physical Therapy Goal  Goals to be met by: 10 days ()    Patient will increase functional independence with mobility by performin. Supine to sit with Stand-by Assistance  2. Sit to supine with Stand-by Assistance  3. Sit to stand transfer with Stand-by Assistance  4. Gait  x 20 feet with Moderate Assistance using Rolling Walker.   5. Lower extremity exercise program x30 reps per handout, with supervision to assist with improvement of muscular strength        Outcome: Ongoing (interventions implemented as appropriate)  PT re-evaluation completed, POC initiated    Ava Ojeda, SPT  2018

## 2018-12-06 NOTE — PLAN OF CARE
12/06/18 1130   Discharge Reassessment   Assessment Type Discharge Planning Reassessment   Provided patient/caregiver education on the expected discharge date and the discharge plan Yes   Discharge Plan A Return to nursing home   Informed patient that she will likely return to [facility] Monday. She is agreeable.

## 2018-12-06 NOTE — PLAN OF CARE
Problem: Physical Therapy Goal  Goal: Physical Therapy Goal  Goals to be met by: 10 days ()    Patient will increase functional independence with mobility by performin. Supine to sit with Stand-by Assistance  2. Sit to supine with Stand-by Assistance  3. Sit to stand transfer with Stand-by Assistance  4. Gait  x 20 feet with Moderate Assistance using Rolling Walker.   5. Lower extremity exercise program x30 reps per handout, with supervision to assist with improvement of muscular strength       Outcome: Outcome(s) achieved Date Met: 18  Goals discontinued due to transfer to ICU. New orders present with re-evaluation soon to follow.    Ambar Quintanilla, PT , DPT  2018

## 2018-12-06 NOTE — PLAN OF CARE
Problem: SLP Goal  Goal: SLP Goal  Speech Language Pathology Goals  Goals expected to be met by 12/8/18    1.  Pt will tolerate Dental soft diet w/ thin liquids w/ no overt s/s of aspiration.  2.  Pt will complete Speech Language Cognitive evaluation to determine the need for additional goals. MET  3. Pt will complete simple problem solving task with 80% accy   4. Pt will participate in ongoing cognitive linguistic aspects          Continue POC at this time, all goals remain appropriate  Chloe Bhatti CCC-SLP  12/6/2018

## 2018-12-06 NOTE — PT/OT/SLP DISCHARGE
Physical Therapy Discharge Summary    Name: Melania Malagon  MRN: [id number]   Principal Problem: Cervical spine fracture     Patient Discharged from acute Physical Therapy on 2018.  Please refer to prior PT noted date on 2018 for functional status.     Assessment:     Patient appropriate for care in another setting.    Objective:     GOALS:   Multidisciplinary Problems     Physical Therapy Goals        Problem: Physical Therapy Goal    Goal Priority Disciplines Outcome Goal Variances Interventions   Physical Therapy Goal     PT, PT/OT Ongoing (interventions implemented as appropriate)     Description:  Goals to be met by: 10 days ()    Patient will increase functional independence with mobility by performin. Supine to sit with Stand-by Assistance  2. Sit to supine with Stand-by Assistance  3. Sit to stand transfer with Stand-by Assistance  4. Gait  x 20 feet with Moderate Assistance using Rolling Walker.   5. Lower extremity exercise program x30 reps per handout, with supervision to assist with improvement of muscular strength                        Reasons for Discontinuation of Therapy Services  Transfer to alternate level of care.      Plan:     Patient Discharged to: Neuro ICU; pt then stepdown to floor .    Ambar Quintanilla, PT  2018

## 2018-12-06 NOTE — PROGRESS NOTES
Hospital Medicine  Progress note    I personally performed the history, physical exam and medical decision making: and confirmed the accuracy of the information in the transcribed note.  Zoran Morillo M.D.  Pager: [phone]  Cell Phone: [phone]    Team: [facility]OSP MED B Zoran Morillo MD  Admit Date: 11/22/2018  PASTOR 12/10/2018  Code status: Full Code    Principal Problem:  Cervical spine fracture    Interval hx:      ROS   Constitutional: Negative for diaphoresis and fever.   HENT: Negative for facial swelling.    Eyes: Negative for photophobia.   Respiratory: Negative for cough, choking and chest tightness.    Cardiovascular: Negative for chest pain.   Gastrointestinal: Negative for abdominal distention and abdominal pain.   Genitourinary: Negative for difficulty urinating.   Neurological: Negative for headaches.       PEx  Temp:  [96.7 °F (35.9 °C)-98.5 °F (36.9 °C)]   Pulse:  [64-81]   Resp:  [16-20]   BP: (124-193)/(58-75)   SpO2:  [94 %-97 %]     Intake/Output Summary (Last 24 hours) at 12/6/2018 1531  Last data filed at 12/6/2018 0500  Gross per 24 hour   Intake --   Output 550 ml   Net -550 ml     Constitutional: She appears well-developed.   Eyes: Pupils are equal, round, and reactive to light.   Neck: Decreased range of motion present.   Cardiovascular: Normal rate and regular rhythm.   Pulmonary/Chest: Effort normal.   Abdominal: Normal appearance.   Neurological:   E4,V5,M6 GCS15   Cranial Nerves II - XII grossly intact   PERRLA   Reflexes and Coordination grossly intact        Recent Labs   Lab 12/04/18 0225 12/05/18 0109 12/06/18  0433   WBC 13.35* 13.66* 8.89   HGB 7.9* 7.8* 8.2*   HCT 27.1* 27.7* 29.1*    270 268     Recent Labs   Lab 12/04/18 0225 12/05/18 0109 12/06/18  0433    137 138   K 3.8 3.5 3.6    105 106   CO2 25 25 23   BUN 13 8 6*   CREATININE 1.0 0.9 0.8   * 120* 81   CALCIUM 7.7* 7.7* 8.2*   MG 1.5* 1.4* 1.4*   PHOS 2.2* 1.9* 2.0*     Recent Labs   Lab  12/01/18  2347  12/03/18  0158 12/04/18  0225 12/05/18  0109 12/06/18  0433   ALKPHOS 68   < > 63 62 62 61   ALT 19   < > 19 16 14 12   AST 43*   < > 44* 38 29 28   ALBUMIN 2.3*   < > 2.4* 2.3* 2.3* 2.3*   PROT 6.6   < > 6.6 6.4 6.5 6.4   BILITOT 0.9   < > 0.6 0.8 0.9 1.0   INR 1.3*  --  1.2  --   --   --     < > = values in this interval not displayed.      Recent Labs   Lab 12/04/18  2330 12/05/18  0517 12/05/18  1238 12/05/18  1738 12/05/18  2101 12/06/18  1130   POCTGLUCOSE 117* 142* 114* 94 83 124*     No results for input(s): CPK, CPKMB, MB, TROPONINI in the last 72 hours.    Scheduled Meds:   amiodarone  400 mg Oral BID    aspirin  81 mg Oral Daily    DULoxetine  60 mg Oral Daily    ferrous sulfate  325 mg Oral BID    heparin (porcine)  5,000 Units Subcutaneous Q8H    levETIRAcetam  2,000 mg Oral BID    levothyroxine  100 mcg Oral Before breakfast    lorazepam  2 mg Intravenous Once    miconazole nitrate 2%   Topical (Top) BID    sodium chloride 0.9%  3 mL Intravenous Q8H     Continuous Infusions:  As Needed:  acetaminophen, albuterol-ipratropium, dextrose 50%, glucagon (human recombinant), insulin aspart U-100, magnesium oxide, magnesium oxide, miconazole nitrate 2%, ondansetron, potassium chloride 10%, potassium chloride 10%, potassium chloride 10%, potassium, sodium phosphates, potassium, sodium phosphates, potassium, sodium phosphates, sodium chloride 0.9%    Active Hospital Problems    Diagnosis  POA    *Cervical spine fracture [S12.9XXA]  Yes    Intertrigo [L30.4]  Yes    Cardiac arrest [I46.9]  No    Hypothyroidism [E03.9]  Yes    Dysphagia [R13.10]  Yes    Anemia of chronic disease [D63.8]  Yes    VT (ventricular tachycardia) [I47.2]  No    Hypophosphatemia [E83.39]  Yes    Restlessness and agitation [R45.1]  Yes    Endotracheally intubated [Z97.8]  Yes    [place] coma scale total score 9-12 [R40.2420]  Yes    Seizures [R56.9]  Yes             Altered mental status [R41.82]  Yes       Resolved Hospital Problems    Diagnosis Date Resolved POA    Bradycardia [R00.1] 11/30/2018 No    Seborrheic keratoses [L82.1] 11/30/2018 Yes    Nipple crusting [N64.89] 11/28/2018 Yes    Leukocytosis [D72.829] 11/30/2018 Yes       Overview  73 y F transferred from [place] for cervical spine injury and seizure activity. She has pmh of HTN, DM, seizure disorder and had an unwitnessed seizure (presumed) at nursing home today followed by fall. On arrival by EMS she was responding minimally. Patient to Neuro ICU on admit for high level of care, Neurosurgery consulted and epilepsy consulted. Seoquel added with derm consultation as well. Keppra load given for seizure precautions/ppx. No surgical intervention at the moment needed per NSGY. Patient extubated appropriately and SLP, PT, OT were consulted. EEG stopped and patient stepped down to internal medicine. Continued C-collar precautions.     Assessment and Plan for Problems addressed today:    Cervical spine fracture     MRI c-spine without cord compression, contusion or hematoma  CT c-spine with minimally displaced fractures of the posterior arch of the C1 vertebral body in keeping with type 1 Sidney fracture and Type 2 odontoid fracture  C-collar in place  Per NSGY no plans to surgically intervene at the moment  Follow up NSGY for recs as outpatient      Altered mental status     Likely due to seizure activity.   EEG monitoring stopped  keppra load and maintenance keppra.          Seizures     Stop EEG  Discussed with epilepsy   continue levetiracetam  No seizures currently on EEG         Cardiac arrest     Hx of VF arrest verses unstable Torsades  Cardiac recs following appreciated   Needs cardiac workup once stable  Plan stress test  Treating seizures            Hypophosphatemia     Replete daily prn         Hypothyroidism     Continue synthroid          Dysphagia     - Swallow Test passed  - diet restarted              The patient is being Prophylaxed  for:  Venous Thromboembolism with: Mechanical or Chemical    Discharge plan and follow up    Provider    I personally scribed for Zoran Morillo MD on 12/06/2018 at 3:20 PM. Electronically signed by ana Najera III on 12/06/2018 at 3:20 PM

## 2018-12-07 LAB
ALBUMIN SERPL BCP-MCNC: 2.2 G/DL
ALP SERPL-CCNC: 71 U/L
ALT SERPL W/O P-5'-P-CCNC: 14 U/L
ANION GAP SERPL CALC-SCNC: 10 MMOL/L
ANISOCYTOSIS BLD QL SMEAR: SLIGHT
AST SERPL-CCNC: 34 U/L
BASOPHILS # BLD AUTO: 0.06 K/UL
BASOPHILS NFR BLD: 0.8 %
BILIRUB SERPL-MCNC: 0.9 MG/DL
BUN SERPL-MCNC: 7 MG/DL
CALCIUM SERPL-MCNC: 8 MG/DL
CHLORIDE SERPL-SCNC: 102 MMOL/L
CO2 SERPL-SCNC: 25 MMOL/L
CREAT SERPL-MCNC: 0.8 MG/DL
DIFFERENTIAL METHOD: ABNORMAL
EOSINOPHIL # BLD AUTO: 0.4 K/UL
EOSINOPHIL NFR BLD: 4.6 %
ERYTHROCYTE [DISTWIDTH] IN BLOOD BY AUTOMATED COUNT: 21.6 %
EST. GFR  (AFRICAN AMERICAN): >60 ML/MIN/1.73 M^2
EST. GFR  (NON AFRICAN AMERICAN): >60 ML/MIN/1.73 M^2
GLUCOSE SERPL-MCNC: 78 MG/DL
HCT VFR BLD AUTO: 28.7 %
HGB BLD-MCNC: 8.2 G/DL
HYPOCHROMIA BLD QL SMEAR: ABNORMAL
IMM GRANULOCYTES # BLD AUTO: 0.03 K/UL
IMM GRANULOCYTES NFR BLD AUTO: 0.4 %
LYMPHOCYTES # BLD AUTO: 1.5 K/UL
LYMPHOCYTES NFR BLD: 19.9 %
MAGNESIUM SERPL-MCNC: 1.3 MG/DL
MCH RBC QN AUTO: 24.7 PG
MCHC RBC AUTO-ENTMCNC: 28.6 G/DL
MCV RBC AUTO: 86 FL
MONOCYTES # BLD AUTO: 1.1 K/UL
MONOCYTES NFR BLD: 13.8 %
NEUTROPHILS # BLD AUTO: 4.7 K/UL
NEUTROPHILS NFR BLD: 60.5 %
NRBC BLD-RTO: 0 /100 WBC
OVALOCYTES BLD QL SMEAR: ABNORMAL
PHOSPHATE SERPL-MCNC: 1.9 MG/DL
PLATELET # BLD AUTO: 287 K/UL
PMV BLD AUTO: 10.2 FL
POCT GLUCOSE: 79 MG/DL (ref 70–110)
POCT GLUCOSE: 99 MG/DL (ref 70–110)
POIKILOCYTOSIS BLD QL SMEAR: SLIGHT
POLYCHROMASIA BLD QL SMEAR: ABNORMAL
POTASSIUM SERPL-SCNC: 3.3 MMOL/L
PROT SERPL-MCNC: 6.4 G/DL
RBC # BLD AUTO: 3.32 M/UL
SODIUM SERPL-SCNC: 137 MMOL/L
WBC # BLD AUTO: 7.75 K/UL

## 2018-12-07 PROCEDURE — 97165 OT EVAL LOW COMPLEX 30 MIN: CPT

## 2018-12-07 PROCEDURE — 84100 ASSAY OF PHOSPHORUS: CPT

## 2018-12-07 PROCEDURE — 25000003 PHARM REV CODE 250: Performed by: NURSE PRACTITIONER

## 2018-12-07 PROCEDURE — 80053 COMPREHEN METABOLIC PANEL: CPT

## 2018-12-07 PROCEDURE — 36415 COLL VENOUS BLD VENIPUNCTURE: CPT

## 2018-12-07 PROCEDURE — 63600175 PHARM REV CODE 636 W HCPCS: Performed by: NURSE PRACTITIONER

## 2018-12-07 PROCEDURE — 11000001 HC ACUTE MED/SURG PRIVATE ROOM

## 2018-12-07 PROCEDURE — A4216 STERILE WATER/SALINE, 10 ML: HCPCS | Performed by: NURSE PRACTITIONER

## 2018-12-07 PROCEDURE — 99233 SBSQ HOSP IP/OBS HIGH 50: CPT | Mod: ,,, | Performed by: INTERNAL MEDICINE

## 2018-12-07 PROCEDURE — 25000003 PHARM REV CODE 250: Performed by: PHYSICIAN ASSISTANT

## 2018-12-07 PROCEDURE — 85025 COMPLETE CBC W/AUTO DIFF WBC: CPT

## 2018-12-07 PROCEDURE — 83735 ASSAY OF MAGNESIUM: CPT

## 2018-12-07 PROCEDURE — 25000003 PHARM REV CODE 250: Performed by: PSYCHIATRY & NEUROLOGY

## 2018-12-07 RX ORDER — MAG HYDROX/ALUMINUM HYD/SIMETH 200-200-20
30 SUSPENSION, ORAL (FINAL DOSE FORM) ORAL ONCE
Status: COMPLETED | OUTPATIENT
Start: 2018-12-07 | End: 2018-12-07

## 2018-12-07 RX ADMIN — HEPARIN SODIUM 5000 UNITS: 5000 INJECTION, SOLUTION INTRAVENOUS; SUBCUTANEOUS at 09:12

## 2018-12-07 RX ADMIN — DULOXETINE HYDROCHLORIDE 60 MG: 60 CAPSULE, DELAYED RELEASE ORAL at 10:12

## 2018-12-07 RX ADMIN — LEVOTHYROXINE SODIUM 100 MCG: 100 TABLET ORAL at 06:12

## 2018-12-07 RX ADMIN — LEVETIRACETAM 2000 MG: 750 TABLET ORAL at 09:12

## 2018-12-07 RX ADMIN — MICONAZOLE NITRATE: 20 OINTMENT TOPICAL at 10:12

## 2018-12-07 RX ADMIN — HEPARIN SODIUM 5000 UNITS: 5000 INJECTION, SOLUTION INTRAVENOUS; SUBCUTANEOUS at 06:12

## 2018-12-07 RX ADMIN — Medication 3 ML: at 02:12

## 2018-12-07 RX ADMIN — Medication 3 ML: at 09:12

## 2018-12-07 RX ADMIN — AMIODARONE HYDROCHLORIDE 400 MG: 200 TABLET ORAL at 10:12

## 2018-12-07 RX ADMIN — FERROUS SULFATE TAB EC 325 MG (65 MG FE EQUIVALENT) 325 MG: 325 (65 FE) TABLET DELAYED RESPONSE at 10:12

## 2018-12-07 RX ADMIN — AMIODARONE HYDROCHLORIDE 400 MG: 200 TABLET ORAL at 09:12

## 2018-12-07 RX ADMIN — LEVETIRACETAM 2000 MG: 750 TABLET ORAL at 10:12

## 2018-12-07 RX ADMIN — MICONAZOLE NITRATE: 20 OINTMENT TOPICAL at 09:12

## 2018-12-07 RX ADMIN — FERROUS SULFATE TAB EC 325 MG (65 MG FE EQUIVALENT) 325 MG: 325 (65 FE) TABLET DELAYED RESPONSE at 09:12

## 2018-12-07 RX ADMIN — ALUMINUM HYDROXIDE, MAGNESIUM HYDROXIDE, AND SIMETHICONE 30 ML: 200; 200; 20 SUSPENSION ORAL at 12:12

## 2018-12-07 RX ADMIN — ASPIRIN 81 MG CHEWABLE TABLET 81 MG: 81 TABLET CHEWABLE at 10:12

## 2018-12-07 RX ADMIN — HEPARIN SODIUM 5000 UNITS: 5000 INJECTION, SOLUTION INTRAVENOUS; SUBCUTANEOUS at 02:12

## 2018-12-07 NOTE — PROGRESS NOTES
Hospital Medicine  Progress note    I personally performed the history, physical exam and medical decision making: and confirmed the accuracy of the information in the transcribed note.  Zoran Morillo M.D.  Pager: [phone]  Cell Phone: [phone]    Team: [facility] [place] B Zoran Morillo MD  Admit Date: 11/22/2018  PASTOR 12/7/2018  Code status: Full Code    Principal Problem:  Cervical spine fracture    Interval hx: Patient to be seen by PT today.        ROS   Constitutional: Negative for diaphoresis and fever.   HENT: Negative for facial swelling.    Eyes: Negative for photophobia.   Respiratory: Negative for cough, choking and chest tightness.    Cardiovascular: Negative for chest pain.   Gastrointestinal: Negative for abdominal distention and abdominal pain.   Genitourinary: Negative for difficulty urinating.   Neurological: Negative for headaches.       PEx  Temp:  [96.5 °F (35.8 °C)-98.9 °F (37.2 °C)]   Pulse:  [76-87]   Resp:  [16-20]   BP: (137-187)/(61-83)   SpO2:  [96 %-100 %]     Intake/Output Summary (Last 24 hours) at 12/7/2018 1512  Last data filed at 12/7/2018 0500  Gross per 24 hour   Intake 350 ml   Output 700 ml   Net -350 ml     Constitutional: She appears well-developed.   Eyes: Pupils are equal, round, and reactive to light.   Neck: Decreased range of motion present.   Cardiovascular: Normal rate and regular rhythm.   Pulmonary/Chest: Effort normal.   Abdominal: Normal appearance.   Neurological:   E4,V5,M6 GCS15   Cranial Nerves II - XII grossly intact   PERRLA   Reflexes and Coordination grossly intact        Recent Labs   Lab 12/05/18 0109 12/06/18  0433 12/07/18 0434   WBC 13.66* 8.89 7.75   HGB 7.8* 8.2* 8.2*   HCT 27.7* 29.1* 28.7*    268 287     Recent Labs   Lab 12/05/18 0109 12/06/18  0433 12/07/18 0434    138 137   K 3.5 3.6 3.3*    106 102   CO2 25 23 25   BUN 8 6* 7*   CREATININE 0.9 0.8 0.8   * 81 78   CALCIUM 7.7* 8.2* 8.0*   MG 1.4* 1.4* 1.3*   PHOS 1.9*  2.0* 1.9*     Recent Labs   Lab 12/01/18  2347  12/03/18  0158  12/05/18  0109 12/06/18  0433 12/07/18  0434   ALKPHOS 68   < > 63   < > 62 61 71   ALT 19   < > 19   < > 14 12 14   AST 43*   < > 44*   < > 29 28 34   ALBUMIN 2.3*   < > 2.4*   < > 2.3* 2.3* 2.2*   PROT 6.6   < > 6.6   < > 6.5 6.4 6.4   BILITOT 0.9   < > 0.6   < > 0.9 1.0 0.9   INR 1.3*  --  1.2  --   --   --   --     < > = values in this interval not displayed.      Recent Labs   Lab 12/05/18  1738 12/05/18  2101 12/06/18  1130 12/06/18  1634 12/06/18  2218 12/07/18  1214   POCTGLUCOSE 94 83 124* 113* 93 79     No results for input(s): CPK, CPKMB, MB, TROPONINI in the last 72 hours.    Scheduled Meds:   amiodarone  400 mg Oral BID    aspirin  81 mg Oral Daily    DULoxetine  60 mg Oral Daily    ferrous sulfate  325 mg Oral BID    heparin (porcine)  5,000 Units Subcutaneous Q8H    levETIRAcetam  2,000 mg Oral BID    levothyroxine  100 mcg Oral Before breakfast    lorazepam  2 mg Intravenous Once    miconazole nitrate 2%   Topical (Top) BID    sodium chloride 0.9%  3 mL Intravenous Q8H     Continuous Infusions:  As Needed:  acetaminophen, albuterol-ipratropium, dextrose 50%, glucagon (human recombinant), insulin aspart U-100, magnesium oxide, magnesium oxide, miconazole nitrate 2%, ondansetron, potassium chloride 10%, potassium chloride 10%, potassium chloride 10%, potassium, sodium phosphates, potassium, sodium phosphates, potassium, sodium phosphates    Active Hospital Problems    Diagnosis  POA    *Cervical spine fracture [S12.9XXA]  Yes    Intertrigo [L30.4]  Yes    Cardiac arrest [I46.9]  No    Hypothyroidism [E03.9]  Yes    Dysphagia [R13.10]  Yes    Anemia of chronic disease [D63.8]  Yes    VT (ventricular tachycardia) [I47.2]  No    Hypophosphatemia [E83.39]  Yes    Restlessness and agitation [R45.1]  Yes    Endotracheally intubated [Z97.8]  Yes    [place] coma scale total score 9-12 [R40.2420]  Yes    Seizures [R56.9]  Yes              Altered mental status [R41.82]  Yes      Resolved Hospital Problems    Diagnosis Date Resolved POA    Bradycardia [R00.1] 11/30/2018 No    Seborrheic keratoses [L82.1] 11/30/2018 Yes    Nipple crusting [N64.89] 11/28/2018 Yes    Leukocytosis [D72.829] 11/30/2018 Yes       Overview  73 y F transferred from [place] for cervical spine injury and seizure activity. She has pmh of HTN, DM, seizure disorder and had an unwitnessed seizure (presumed) at nursing home today followed by fall. On arrival by EMS she was responding minimally. Patient to Neuro ICU on admit for high level of care, Neurosurgery consulted and epilepsy consulted. Seoquel added with derm consultation as well. Keppra load given for seizure precautions/ppx. No surgical intervention at the moment needed per NSGY. Patient extubated appropriately and SLP, PT, OT were consulted. EEG stopped and patient stepped down to internal medicine. Continued C-collar precautions.     Assessment and Plan for Problems addressed today:    Cervical spine fracture     MRI c-spine without cord compression, contusion or hematoma  CT c-spine with minimally displaced fractures of the posterior arch of the C1 vertebral body in keeping with type 1 Sidney fracture and Type 2 odontoid fracture  C-collar in place  Per NSGY no plans to surgically intervene at the moment  Follow up NSGY for recs as outpatient      Altered mental status     Likely due to seizure activity.   EEG monitoring stopped  keppra load and maintenance keppra.          Seizures     Stop EEG  Discussed with epilepsy   continue levetiracetam  No seizures currently on EEG         Cardiac arrest     Hx of VF arrest verses unstable Torsades  Cardiac recs following appreciated   Needs cardiac workup once stable  Plan stress test  Treating seizures            Hypophosphatemia     Replete daily prn         Hypothyroidism     Continue synthroid          Dysphagia     - Swallow Test passed  - diet  restarted              The patient is being Prophylaxed for:  Venous Thromboembolism with: Mechanical or Chemical    Discharge plan and follow up    Provider    I personally scribed for Zoran Morillo MD on 12/07/2018 at 2:40 PM. Electronically signed by ana Najera III on 12/07/2018 at 2:40 PM

## 2018-12-07 NOTE — MEDICAL/APP STUDENT
[facility] Medicine  Progress note    [place] [facility] Jose Najera  Admit Date: 11/22/2018  PASTOR 12/10/2018  Code status: Full Code    Principal Problem:  Cervical spine fracture    Interval hx: Patient to be seen by PT today.        ROS   Constitutional: Negative for diaphoresis and fever.   HENT: Negative for facial swelling.    Eyes: Negative for photophobia.   Respiratory: Negative for cough, choking and chest tightness.    Cardiovascular: Negative for chest pain.   Gastrointestinal: Negative for abdominal distention and abdominal pain.   Genitourinary: Negative for difficulty urinating.   Neurological: Negative for headaches.       PEx  Temp:  [96.5 °F (35.8 °C)-98.9 °F (37.2 °C)]   Pulse:  [70-87]   Resp:  [18-20]   BP: (137-175)/(61-83)   SpO2:  [97 %-100 %]     Intake/Output Summary (Last 24 hours) at 12/7/2018 0818  Last data filed at 12/7/2018 0500  Gross per 24 hour   Intake 470 ml   Output 700 ml   Net -230 ml     Constitutional: She appears well-developed.   Eyes: Pupils are equal, round, and reactive to light.   Neck: Decreased range of motion present.   Cardiovascular: Normal rate and regular rhythm.   Pulmonary/Chest: Effort normal.   Abdominal: Normal appearance.   Neurological:   E4,V5,M6 GCS15   Cranial Nerves II - XII grossly intact   PERRLA   Reflexes and Coordination grossly intact        Recent Labs   Lab 12/05/18  0109 12/06/18  0433 12/07/18  0434   WBC 13.66* 8.89 7.75   HGB 7.8* 8.2* 8.2*   HCT 27.7* 29.1* 28.7*    268 287     Recent Labs   Lab 12/05/18  0109 12/06/18  0433 12/07/18  0434    138 137   K 3.5 3.6 3.3*    106 102   CO2 25 23 25   BUN 8 6* 7*   CREATININE 0.9 0.8 0.8   * 81 78   CALCIUM 7.7* 8.2* 8.0*   MG 1.4* 1.4* 1.3*   PHOS 1.9* 2.0* 1.9*     Recent Labs   Lab 12/01/18  2347  12/03/18  0158  12/05/18  0109 12/06/18  0433 12/07/18  0434   ALKPHOS 68   < > 63   < > 62 61 71   ALT 19   < > 19   < > 14 12 14   AST 43*   < > 44*   < > 29 28 34    ALBUMIN 2.3*   < > 2.4*   < > 2.3* 2.3* 2.2*   PROT 6.6   < > 6.6   < > 6.5 6.4 6.4   BILITOT 0.9   < > 0.6   < > 0.9 1.0 0.9   INR 1.3*  --  1.2  --   --   --   --     < > = values in this interval not displayed.      Recent Labs   Lab 12/05/18  1238 12/05/18  1738 12/05/18  2101 12/06/18  1130 12/06/18  1634 12/06/18  2218   POCTGLUCOSE 114* 94 83 124* 113* 93     No results for input(s): CPK, CPKMB, MB, TROPONINI in the last 72 hours.    Scheduled Meds:   amiodarone  400 mg Oral BID    aspirin  81 mg Oral Daily    DULoxetine  60 mg Oral Daily    ferrous sulfate  325 mg Oral BID    heparin (porcine)  5,000 Units Subcutaneous Q8H    levETIRAcetam  2,000 mg Oral BID    levothyroxine  100 mcg Oral Before breakfast    lorazepam  2 mg Intravenous Once    miconazole nitrate 2%   Topical (Top) BID    sodium chloride 0.9%  3 mL Intravenous Q8H     Continuous Infusions:  As Needed:  acetaminophen, albuterol-ipratropium, dextrose 50%, glucagon (human recombinant), insulin aspart U-100, magnesium oxide, magnesium oxide, miconazole nitrate 2%, ondansetron, potassium chloride 10%, potassium chloride 10%, potassium chloride 10%, potassium, sodium phosphates, potassium, sodium phosphates, potassium, sodium phosphates    Active Hospital Problems    Diagnosis  POA    *Cervical spine fracture [S12.9XXA]  Yes    Intertrigo [L30.4]  Yes    Cardiac arrest [I46.9]  No    Hypothyroidism [E03.9]  Yes    Dysphagia [R13.10]  Yes    Anemia of chronic disease [D63.8]  Yes    VT (ventricular tachycardia) [I47.2]  No    Hypophosphatemia [E83.39]  Yes    Restlessness and agitation [R45.1]  Yes    Endotracheally intubated [Z97.8]  Yes    Marlen coma scale total score 9-12 [R40.2420]  Yes    Seizures [R56.9]  Yes             Altered mental status [R41.82]  Yes      Resolved Hospital Problems    Diagnosis Date Resolved POA    Bradycardia [R00.1] 11/30/2018 No    Seborrheic keratoses [L82.1] 11/30/2018 Yes    Nipple  crusting [N64.89] 11/28/2018 Yes    Leukocytosis [D72.829] 11/30/2018 Yes       Overview  73 y F transferred from [place] for cervical spine injury and seizure activity. She has pmh of HTN, DM, seizure disorder and had an unwitnessed seizure (presumed) at nursing home today followed by fall. On arrival by EMS she was responding minimally. Patient to Neuro ICU on admit for high level of care, Neurosurgery consulted and epilepsy consulted. Seoquel added with derm consultation as well. Keppra load given for seizure precautions/ppx. No surgical intervention at the moment needed per NSGY. Patient extubated appropriately and SLP, PT, OT were consulted. EEG stopped and patient stepped down to internal medicine. Continued C-collar precautions.     Assessment and Plan for Problems addressed today:    Cervical spine fracture     MRI c-spine without cord compression, contusion or hematoma  CT c-spine with minimally displaced fractures of the posterior arch of the C1 vertebral body in keeping with type 1 Sidney fracture and Type 2 odontoid fracture  C-collar in place  Per NSGY no plans to surgically intervene at the moment  Follow up NSGY for recs as outpatient      Altered mental status     Likely due to seizure activity.   EEG monitoring stopped  keppra load and maintenance keppra.          Seizures     Stop EEG  Discussed with epilepsy   continue levetiracetam  No seizures currently on EEG         Cardiac arrest     Hx of VF arrest verses unstable Torsades  Cardiac recs following appreciated   Needs cardiac workup once stable  Plan stress test  Treating seizures            Hypophosphatemia     Replete daily prn         Hypothyroidism     Continue synthroid          Dysphagia     - Swallow Test passed  - diet restarted              The patient is being Prophylaxed for:  Venous Thromboembolism with: Mechanical or Chemical    Discharge plan and follow up    Provider    I personally scribed for Zoran Morillo MD on  12/07/2018 at 2:40 PM. Electronically signed by ana Najera III on 12/07/2018 at 2:40 PM

## 2018-12-07 NOTE — PLAN OF CARE
LES spoke with Mariela with [facility] ([phone] or cell [phone]) reporting they would be able to take the Pt when they are ready and would have her return SNF. They will provide transport if they have at least a day or two notice as they only have one . LES advised per CM note, the Pt may be ready Monday. She will look to make arrangements for Monday and asked that the CM or SW call her Monday AM plus send updates.    Nabila Anguiano, JOANN  Neurocritical Care   [facility]  [id number]

## 2018-12-07 NOTE — PT/OT/SLP EVAL
Occupational Therapy   Evaluation    Name: Melania Malagon  MRN: [id number]  Admitting Diagnosis:  Cervical spine fracture      Recommendations:     Discharge Recommendations: nursing facility, skilled  Discharge Equipment Recommendations:  none  Barriers to discharge:  None    History:     Occupational Profile:  Living Environment: Pt lives in nursing home and to return   Previous level of function: Pt with mod A for self care completion with facility staff   Equipment Used at Home:  wheelchair, walker, rolling  Assistance upon Discharge: faciltiy staff     Past Medical History:   Diagnosis Date    Altered mental status 11/22/2018    Leukocytosis 11/22/2018    Seborrheic keratoses 11/26/2018    V-tach 11/27/2018       History reviewed. No pertinent surgical history.    Subjective     Chief Complaint: Pt with complaints of not feeling well and poor activity tolerance   Patient/Family Comments/goals: return to PLOF     Pain/Comfort:  Pain Rating 1: 7/10    Patients cultural, spiritual, Sabianist conflicts given the current situation: none stated     Objective:     Communicated with: RN prior to session.  Patient found with: all lines intact and cervical collar in place  and telemetry, peripheral IV, bed alarm, tellez catheter upon OT entry to room.    General Precautions: Standard, fall   Orthopedic Precautions:spinal precautions   Braces: Cervical collar     Occupational Performance:    Bed Mobility:    · Pt with max A for rolling and with poor tolerance due to pain     Activities of Daily Living:  · Pt max A for self care completion     Cognitive/Visual Perceptual:  Cognitive/Psychosocial Skills:     -       Oriented to: Person, Place, Time and Situation   -       Follows Commands/attention:Follows one-step commands  -       Communication: clear/fluent  -       Memory: No Deficits noted  -       Safety awareness/insight to disability: intact   -       Mood/Affect/Coping skills/emotional control: Appropriate to  "situation    Physical Exam:  Upper Extremity Range of Motion:     -       Right Upper Extremity: WFL  -       Left Upper Extremity: WFL  Upper Extremity Strength:    -       Right Upper Extremity: WFL  -       Left Upper Extremity: WFL    AMPAC 6 Click ADL:  AMPAC Total Score: 14    Treatment & Education:  Evaluation complete and goals set.  Pt educated on safety, role of OT, importance of increased participation in self care for gains , expectations for participation, expectations for gains, POC, energy conservation, caregiver strain. White board updated.     Education:    Patient left supine with all lines intact    Assessment:     Melania Malagon is a 73 y.o. female with a medical diagnosis of Cervical spine fracture. Pt presented supine in bed with C collar in place.  Pt in pain and with poor tolerance of session.  Pt to return to NH for continued care after DC from acute setting.   She presents with the following performance deficits affecting function: weakness, impaired endurance, impaired self care skills, impaired functional mobilty, gait instability, impaired balance.      Rehab Prognosis: Fair; patient would benefit from acute skilled OT services to address these deficits and reach maximum level of function.         Clinical Decision Makin.  OT Mod:  "Pt evaluation falls under moderate complexity for evaluation coding due to identification of 3-5 performance deficits noted as stated above. Eval required Min/Mod assistance to complete on this date and detailed assessment(s) were utilized. Moreover, an expanded review of history and occupational profile obtained with additional review of cognitive, physical and psychosocial hx."     Plan:     Patient to be seen 3 x/week to address the above listed problems via self-care/home management, therapeutic activities  · Plan of Care Expires: 19  · Plan of Care Reviewed with: patient    This Plan of care has been discussed with the patient who was " involved in its development and understands and is in agreement with the identified goals and treatment plan    GOALS:   Multidisciplinary Problems     Occupational Therapy Goals        Problem: Occupational Therapy Goal    Goal Priority Disciplines Outcome Interventions   Occupational Therapy Goal     OT, PT/OT Ongoing (interventions implemented as appropriate)    Description:  Goals to be met by: 12/25    Patient will increase functional independence with ADLs by performing:    Feeding with Set-up Assistance.  UE Dressing with Contact Guard Assistance.  LE Dressing with Minimal Assistance.  Grooming while seated with Set-up Assistance.  Toileting from toilet with Minimal Assistance for hygiene and clothing management.   Bathing from  edge of bed with Minimal Assistance.                      Time Tracking:     OT Date of Treatment: 12/07/18  OT Start Time: 1200  OT Stop Time: 1215  OT Total Time (min): 15 min    Billable Minutes:Evaluation 15    Kecia Colmenares OT  12/7/2018

## 2018-12-07 NOTE — PLAN OF CARE
Problem: Occupational Therapy Goal  Goal: Occupational Therapy Goal  Goals to be met by: 12/25    Patient will increase functional independence with ADLs by performing:    Feeding with Set-up Assistance.  UE Dressing with Contact Guard Assistance.  LE Dressing with Minimal Assistance.  Grooming while seated with Set-up Assistance.  Toileting from toilet with Minimal Assistance for hygiene and clothing management.   Bathing from  edge of bed with Minimal Assistance.    Outcome: Ongoing (interventions implemented as appropriate)  Evaluation complete and goals set.  Cont with POC  Kecia Colmenares OT  12/7/2018

## 2018-12-08 LAB
ALBUMIN SERPL BCP-MCNC: 2.3 G/DL
ALP SERPL-CCNC: 66 U/L
ALT SERPL W/O P-5'-P-CCNC: 13 U/L
ANION GAP SERPL CALC-SCNC: 8 MMOL/L
AST SERPL-CCNC: 26 U/L
BASOPHILS # BLD AUTO: 0.06 K/UL
BASOPHILS NFR BLD: 0.9 %
BILIRUB SERPL-MCNC: 0.9 MG/DL
BUN SERPL-MCNC: 5 MG/DL
CALCIUM SERPL-MCNC: 8.3 MG/DL
CHLORIDE SERPL-SCNC: 102 MMOL/L
CO2 SERPL-SCNC: 27 MMOL/L
CREAT SERPL-MCNC: 0.8 MG/DL
DIFFERENTIAL METHOD: ABNORMAL
EOSINOPHIL # BLD AUTO: 0.3 K/UL
EOSINOPHIL NFR BLD: 3.7 %
ERYTHROCYTE [DISTWIDTH] IN BLOOD BY AUTOMATED COUNT: 21.5 %
EST. GFR  (AFRICAN AMERICAN): >60 ML/MIN/1.73 M^2
EST. GFR  (NON AFRICAN AMERICAN): >60 ML/MIN/1.73 M^2
GLUCOSE SERPL-MCNC: 97 MG/DL
HCT VFR BLD AUTO: 28.2 %
HGB BLD-MCNC: 8.1 G/DL
IMM GRANULOCYTES # BLD AUTO: 0.03 K/UL
IMM GRANULOCYTES NFR BLD AUTO: 0.4 %
LYMPHOCYTES # BLD AUTO: 1.5 K/UL
LYMPHOCYTES NFR BLD: 22.6 %
MAGNESIUM SERPL-MCNC: 1.5 MG/DL
MCH RBC QN AUTO: 23.8 PG
MCHC RBC AUTO-ENTMCNC: 28.7 G/DL
MCV RBC AUTO: 83 FL
MONOCYTES # BLD AUTO: 0.9 K/UL
MONOCYTES NFR BLD: 14.1 %
NEUTROPHILS # BLD AUTO: 3.9 K/UL
NEUTROPHILS NFR BLD: 58.3 %
NRBC BLD-RTO: 0 /100 WBC
PHOSPHATE SERPL-MCNC: 2.2 MG/DL
PLATELET # BLD AUTO: 292 K/UL
PMV BLD AUTO: 9.6 FL
POCT GLUCOSE: 127 MG/DL (ref 70–110)
POCT GLUCOSE: 248 MG/DL (ref 70–110)
POTASSIUM SERPL-SCNC: 3.2 MMOL/L
PROT SERPL-MCNC: 6.3 G/DL
RBC # BLD AUTO: 3.4 M/UL
SODIUM SERPL-SCNC: 137 MMOL/L
WBC # BLD AUTO: 6.67 K/UL

## 2018-12-08 PROCEDURE — 25000003 PHARM REV CODE 250: Performed by: NURSE PRACTITIONER

## 2018-12-08 PROCEDURE — 99232 SBSQ HOSP IP/OBS MODERATE 35: CPT | Mod: ,,, | Performed by: INTERNAL MEDICINE

## 2018-12-08 PROCEDURE — A4216 STERILE WATER/SALINE, 10 ML: HCPCS | Performed by: NURSE PRACTITIONER

## 2018-12-08 PROCEDURE — 80053 COMPREHEN METABOLIC PANEL: CPT

## 2018-12-08 PROCEDURE — 84100 ASSAY OF PHOSPHORUS: CPT

## 2018-12-08 PROCEDURE — 25000003 PHARM REV CODE 250: Performed by: PSYCHIATRY & NEUROLOGY

## 2018-12-08 PROCEDURE — 11000001 HC ACUTE MED/SURG PRIVATE ROOM

## 2018-12-08 PROCEDURE — 83735 ASSAY OF MAGNESIUM: CPT

## 2018-12-08 PROCEDURE — 85025 COMPLETE CBC W/AUTO DIFF WBC: CPT

## 2018-12-08 PROCEDURE — 36415 COLL VENOUS BLD VENIPUNCTURE: CPT

## 2018-12-08 PROCEDURE — 63600175 PHARM REV CODE 636 W HCPCS: Performed by: NURSE PRACTITIONER

## 2018-12-08 RX ADMIN — LEVOTHYROXINE SODIUM 100 MCG: 100 TABLET ORAL at 05:12

## 2018-12-08 RX ADMIN — FERROUS SULFATE TAB EC 325 MG (65 MG FE EQUIVALENT) 325 MG: 325 (65 FE) TABLET DELAYED RESPONSE at 08:12

## 2018-12-08 RX ADMIN — Medication 3 ML: at 05:12

## 2018-12-08 RX ADMIN — LEVETIRACETAM 2000 MG: 750 TABLET ORAL at 08:12

## 2018-12-08 RX ADMIN — HEPARIN SODIUM 5000 UNITS: 5000 INJECTION, SOLUTION INTRAVENOUS; SUBCUTANEOUS at 05:12

## 2018-12-08 RX ADMIN — Medication 3 ML: at 01:12

## 2018-12-08 RX ADMIN — AMIODARONE HYDROCHLORIDE 400 MG: 200 TABLET ORAL at 08:12

## 2018-12-08 RX ADMIN — HEPARIN SODIUM 5000 UNITS: 5000 INJECTION, SOLUTION INTRAVENOUS; SUBCUTANEOUS at 01:12

## 2018-12-08 RX ADMIN — HEPARIN SODIUM 5000 UNITS: 5000 INJECTION, SOLUTION INTRAVENOUS; SUBCUTANEOUS at 09:12

## 2018-12-08 RX ADMIN — MICONAZOLE NITRATE: 20 OINTMENT TOPICAL at 08:12

## 2018-12-08 RX ADMIN — Medication 3 ML: at 10:12

## 2018-12-08 RX ADMIN — ASPIRIN 81 MG CHEWABLE TABLET 81 MG: 81 TABLET CHEWABLE at 08:12

## 2018-12-08 RX ADMIN — DULOXETINE HYDROCHLORIDE 60 MG: 60 CAPSULE, DELAYED RELEASE ORAL at 08:12

## 2018-12-08 RX ADMIN — ACETAMINOPHEN 650 MG: 325 TABLET ORAL at 11:12

## 2018-12-08 RX ADMIN — MICONAZOLE NITRATE: 20 OINTMENT TOPICAL at 09:12

## 2018-12-08 NOTE — PLAN OF CARE
Problem: Patient Care Overview  Goal: Plan of Care Review  Outcome: Ongoing (interventions implemented as appropriate)  Greeted patient and gained consent for nursing care. POC reviewed, verbalized understanding. Prepped and made comfortable for the night. Bed on lowest position, locked. Call bell within reach. Assisted in her needs. No complaint of pain. Will continue to monitor.

## 2018-12-08 NOTE — PROGRESS NOTES
Hospital Medicine  Progress note    I personally performed the history, physical exam and medical decision making: and confirmed the accuracy of the information in the transcribed note.  Zoran Morillo M.D.  Pager: [phone]  Cell Phone: [phone]    Team: [facility] MED B Zoran Morillo MD  Admit Date: 11/22/2018  PASTOR 12/7/2018  Code status: Full Code    Principal Problem:  Cervical spine fracture    Interval hx: NAEON      ROS   Constitutional: Negative for diaphoresis and fever.   HENT: Negative for facial swelling.    Eyes: Negative for photophobia.   Respiratory: Negative for cough, choking and chest tightness.    Cardiovascular: Negative for chest pain.   Gastrointestinal: Negative for abdominal distention and abdominal pain.   Genitourinary: Negative for difficulty urinating.   Neurological: Negative for headaches.       PEx  Temp:  [96.9 °F (36.1 °C)-98.5 °F (36.9 °C)]   Pulse:  [73-83]   Resp:  [16-20]   BP: (132-169)/(60-81)   SpO2:  [95 %-98 %]     Intake/Output Summary (Last 24 hours) at 12/8/2018 1531  Last data filed at 12/8/2018 0600  Gross per 24 hour   Intake 420 ml   Output 1290 ml   Net -870 ml     Constitutional: She appears well-developed.   Eyes: Pupils are equal, round, and reactive to light.   Neck: Decreased range of motion present.   Cardiovascular: Normal rate and regular rhythm.   Pulmonary/Chest: Effort normal.   Abdominal: Normal appearance.   Neurological:   E4,V5,M6 GCS15   Cranial Nerves II - XII grossly intact   PERRLA   Reflexes and Coordination grossly intact        Recent Labs   Lab 12/06/18  0433 12/07/18  0434 12/08/18  0621   WBC 8.89 7.75 6.67   HGB 8.2* 8.2* 8.1*   HCT 29.1* 28.7* 28.2*    287 292     Recent Labs   Lab 12/06/18  0433 12/07/18  0434 12/08/18  0621    137 137   K 3.6 3.3* 3.2*    102 102   CO2 23 25 27   BUN 6* 7* 5*   CREATININE 0.8 0.8 0.8   GLU 81 78 97   CALCIUM 8.2* 8.0* 8.3*   MG 1.4* 1.3* 1.5*   PHOS 2.0* 1.9* 2.2*     Recent Labs    Lab 12/01/18  2347  12/03/18  0158  12/06/18  0433 12/07/18  0434 12/08/18  0621   ALKPHOS 68   < > 63   < > 61 71 66   ALT 19   < > 19   < > 12 14 13   AST 43*   < > 44*   < > 28 34 26   ALBUMIN 2.3*   < > 2.4*   < > 2.3* 2.2* 2.3*   PROT 6.6   < > 6.6   < > 6.4 6.4 6.3   BILITOT 0.9   < > 0.6   < > 1.0 0.9 0.9   INR 1.3*  --  1.2  --   --   --   --     < > = values in this interval not displayed.      Recent Labs   Lab 12/05/18  2101 12/06/18  1130 12/06/18  1634 12/06/18  2218 12/07/18  1214 12/07/18  1648   POCTGLUCOSE 83 124* 113* 93 79 99     No results for input(s): CPK, CPKMB, MB, TROPONINI in the last 72 hours.    Scheduled Meds:   amiodarone  400 mg Oral BID    aspirin  81 mg Oral Daily    DULoxetine  60 mg Oral Daily    ferrous sulfate  325 mg Oral BID    heparin (porcine)  5,000 Units Subcutaneous Q8H    levETIRAcetam  2,000 mg Oral BID    levothyroxine  100 mcg Oral Before breakfast    lorazepam  2 mg Intravenous Once    miconazole nitrate 2%   Topical (Top) BID    sodium chloride 0.9%  3 mL Intravenous Q8H     Continuous Infusions:  As Needed:  acetaminophen, albuterol-ipratropium, dextrose 50%, glucagon (human recombinant), insulin aspart U-100, magnesium oxide, magnesium oxide, miconazole nitrate 2%, ondansetron, potassium chloride 10%, potassium chloride 10%, potassium chloride 10%, potassium, sodium phosphates, potassium, sodium phosphates, potassium, sodium phosphates    Active Hospital Problems    Diagnosis  POA    *Cervical spine fracture [S12.9XXA]  Yes    Intertrigo [L30.4]  Yes    Cardiac arrest [I46.9]  No    Hypothyroidism [E03.9]  Yes    Dysphagia [R13.10]  Yes    Anemia of chronic disease [D63.8]  Yes    VT (ventricular tachycardia) [I47.2]  No    Hypophosphatemia [E83.39]  Yes    Restlessness and agitation [R45.1]  Yes    Endotracheally intubated [Z97.8]  Yes    Marlen coma scale total score 9-12 [R40.2420]  Yes    Seizures [R56.9]  Yes             Altered  mental status [R41.82]  Yes      Resolved Hospital Problems    Diagnosis Date Resolved POA    Bradycardia [R00.1] 11/30/2018 No    Seborrheic keratoses [L82.1] 11/30/2018 Yes    Nipple crusting [N64.89] 11/28/2018 Yes    Leukocytosis [D72.829] 11/30/2018 Yes       Overview  73 y F transferred from [place] for cervical spine injury and seizure activity. She has pmh of HTN, DM, seizure disorder and had an unwitnessed seizure (presumed) at nursing home today followed by fall. On arrival by EMS she was responding minimally. Patient to Neuro ICU on admit for high level of care, Neurosurgery consulted and epilepsy consulted. Seoquel added with derm consultation as well. Keppra load given for seizure precautions/ppx. No surgical intervention at the moment needed per NSGY. Patient extubated appropriately and SLP, PT, OT were consulted. EEG stopped and patient stepped down to internal medicine. Continued C-collar precautions.     Assessment and Plan for Problems addressed today:    Cervical spine fracture     MRI c-spine without cord compression, contusion or hematoma  CT c-spine with minimally displaced fractures of the posterior arch of the C1 vertebral body in keeping with type 1 Sidney fracture and Type 2 odontoid fracture  C-collar in place  Per NSGY no plans to surgically intervene at the moment  Follow up NSGY for recs as outpatient      Altered mental status     Likely due to seizure activity.   EEG monitoring stopped  keppra load and maintenance keppra.          Seizures     Stop EEG  Discussed with epilepsy   continue levetiracetam  No seizures currently on EEG         Cardiac arrest     Hx of VF arrest verses unstable Torsades  Cardiac recs following appreciated   Needs cardiac workup once stable  Plan stress test  Treating seizures            Hypophosphatemia     Replete daily prn         Hypothyroidism     Continue synthroid          Dysphagia     - Swallow Test passed  - diet restarted              The  patient is being Prophylaxed for:  Venous Thromboembolism with: Mechanical or Chemical    Discharge plan and follow up    Provider    I personally scribed for Zoran Morillo MD on 12/08/2018 at 9:04 AM. Electronically signed by ana Najera III on 12/08/2018 at 9:04 AM

## 2018-12-08 NOTE — MEDICAL/APP STUDENT
[facility] Medicine  Progress note    Team: [facility]OSP MED B Jose Najera  Admit Date: 11/22/2018  PASTOR 12/7/2018  Code status: Full Code    Principal Problem:  Cervical spine fracture    Interval hx: NAEON      ROS   Constitutional: Negative for diaphoresis and fever.   HENT: Negative for facial swelling.    Eyes: Negative for photophobia.   Respiratory: Negative for cough, choking and chest tightness.    Cardiovascular: Negative for chest pain.   Gastrointestinal: Negative for abdominal distention and abdominal pain.   Genitourinary: Negative for difficulty urinating.   Neurological: Negative for headaches.       PEx  Temp:  [96.5 °F (35.8 °C)-98.5 °F (36.9 °C)]   Pulse:  [76-83]   Resp:  [16-20]   BP: (132-187)/(60-81)   SpO2:  [96 %-98 %]     Intake/Output Summary (Last 24 hours) at 12/8/2018 0725  Last data filed at 12/7/2018 1800  Gross per 24 hour   Intake 520 ml   Output 650 ml   Net -130 ml     Constitutional: She appears well-developed.   Eyes: Pupils are equal, round, and reactive to light.   Neck: Decreased range of motion present.   Cardiovascular: Normal rate and regular rhythm.   Pulmonary/Chest: Effort normal.   Abdominal: Normal appearance.   Neurological:   E4,V5,M6 GCS15   Cranial Nerves II - XII grossly intact   PERRLA   Reflexes and Coordination grossly intact        Recent Labs   Lab 12/06/18  0433 12/07/18  0434 12/08/18  0621   WBC 8.89 7.75 6.67   HGB 8.2* 8.2* 8.1*   HCT 29.1* 28.7* 28.2*    287 292     Recent Labs   Lab 12/06/18  0433 12/07/18  0434 12/08/18  0621    137 137   K 3.6 3.3* 3.2*    102 102   CO2 23 25 27   BUN 6* 7* 5*   CREATININE 0.8 0.8 0.8   GLU 81 78 97   CALCIUM 8.2* 8.0* 8.3*   MG 1.4* 1.3* 1.5*   PHOS 2.0* 1.9* 2.2*     Recent Labs   Lab 12/01/18  2347  12/03/18  0158  12/06/18  0433 12/07/18  0434 12/08/18  0621   ALKPHOS 68   < > 63   < > 61 71 66   ALT 19   < > 19   < > 12 14 13   AST 43*   < > 44*   < > 28 34 26   ALBUMIN 2.3*   < > 2.4*   < > 2.3*  2.2* 2.3*   PROT 6.6   < > 6.6   < > 6.4 6.4 6.3   BILITOT 0.9   < > 0.6   < > 1.0 0.9 0.9   INR 1.3*  --  1.2  --   --   --   --     < > = values in this interval not displayed.      Recent Labs   Lab 12/05/18  2101 12/06/18  1130 12/06/18  1634 12/06/18  2218 12/07/18  1214 12/07/18  1648   POCTGLUCOSE 83 124* 113* 93 79 99     No results for input(s): CPK, CPKMB, MB, TROPONINI in the last 72 hours.    Scheduled Meds:   amiodarone  400 mg Oral BID    aspirin  81 mg Oral Daily    DULoxetine  60 mg Oral Daily    ferrous sulfate  325 mg Oral BID    heparin (porcine)  5,000 Units Subcutaneous Q8H    levETIRAcetam  2,000 mg Oral BID    levothyroxine  100 mcg Oral Before breakfast    lorazepam  2 mg Intravenous Once    miconazole nitrate 2%   Topical (Top) BID    sodium chloride 0.9%  3 mL Intravenous Q8H     Continuous Infusions:  As Needed:  acetaminophen, albuterol-ipratropium, dextrose 50%, glucagon (human recombinant), insulin aspart U-100, magnesium oxide, magnesium oxide, miconazole nitrate 2%, ondansetron, potassium chloride 10%, potassium chloride 10%, potassium chloride 10%, potassium, sodium phosphates, potassium, sodium phosphates, potassium, sodium phosphates    Active Hospital Problems    Diagnosis  POA    *Cervical spine fracture [S12.9XXA]  Yes    Intertrigo [L30.4]  Yes    Cardiac arrest [I46.9]  No    Hypothyroidism [E03.9]  Yes    Dysphagia [R13.10]  Yes    Anemia of chronic disease [D63.8]  Yes    VT (ventricular tachycardia) [I47.2]  No    Hypophosphatemia [E83.39]  Yes    Restlessness and agitation [R45.1]  Yes    Endotracheally intubated [Z97.8]  Yes    Marlen coma scale total score 9-12 [R40.2420]  Yes    Seizures [R56.9]  Yes             Altered mental status [R41.82]  Yes      Resolved Hospital Problems    Diagnosis Date Resolved POA    Bradycardia [R00.1] 11/30/2018 No    Seborrheic keratoses [L82.1] 11/30/2018 Yes    Nipple crusting [N64.89] 11/28/2018 Yes     Leukocytosis [D72.829] 11/30/2018 Yes       Overview  73 y F transferred from [place] for cervical spine injury and seizure activity. She has pmh of HTN, DM, seizure disorder and had an unwitnessed seizure (presumed) at nursing home today followed by fall. On arrival by EMS she was responding minimally. Patient to Neuro ICU on admit for high level of care, Neurosurgery consulted and epilepsy consulted. Seoquel added with derm consultation as well. Keppra load given for seizure precautions/ppx. No surgical intervention at the moment needed per NSGY. Patient extubated appropriately and SLP, PT, OT were consulted. EEG stopped and patient stepped down to internal medicine. Continued C-collar precautions.     Assessment and Plan for Problems addressed today:    Cervical spine fracture     MRI c-spine without cord compression, contusion or hematoma  CT c-spine with minimally displaced fractures of the posterior arch of the C1 vertebral body in keeping with type 1 Sidney fracture and Type 2 odontoid fracture  C-collar in place  Per NSGY no plans to surgically intervene at the moment  Follow up NSGY for recs as outpatient      Altered mental status     Likely due to seizure activity.   EEG monitoring stopped  keppra load and maintenance keppra.          Seizures     Stop EEG  Discussed with epilepsy   continue levetiracetam  No seizures currently on EEG         Cardiac arrest     Hx of VF arrest verses unstable Torsades  Cardiac recs following appreciated   Needs cardiac workup once stable  Plan stress test  Treating seizures            Hypophosphatemia     Replete daily prn         Hypothyroidism     Continue synthroid          Dysphagia     - Swallow Test passed  - diet restarted              The patient is being Prophylaxed for:  Venous Thromboembolism with: Mechanical or Chemical    Discharge plan and follow up    Provider    I personally scribed for Zoran Morillo MD on 12/08/2018 at 9:04 AM. Electronically signed  by ana Najera III on 12/08/2018 at 9:04 AM

## 2018-12-09 LAB
ALBUMIN SERPL BCP-MCNC: 2.3 G/DL
ALP SERPL-CCNC: 65 U/L
ALT SERPL W/O P-5'-P-CCNC: 15 U/L
ANION GAP SERPL CALC-SCNC: 8 MMOL/L
AST SERPL-CCNC: 28 U/L
BASOPHILS # BLD AUTO: 0.06 K/UL
BASOPHILS NFR BLD: 1.1 %
BILIRUB SERPL-MCNC: 0.9 MG/DL
BUN SERPL-MCNC: 5 MG/DL
CALCIUM SERPL-MCNC: 8.2 MG/DL
CHLORIDE SERPL-SCNC: 102 MMOL/L
CO2 SERPL-SCNC: 29 MMOL/L
CREAT SERPL-MCNC: 0.7 MG/DL
DIFFERENTIAL METHOD: ABNORMAL
EOSINOPHIL # BLD AUTO: 0.2 K/UL
EOSINOPHIL NFR BLD: 3 %
ERYTHROCYTE [DISTWIDTH] IN BLOOD BY AUTOMATED COUNT: 21.6 %
EST. GFR  (AFRICAN AMERICAN): >60 ML/MIN/1.73 M^2
EST. GFR  (NON AFRICAN AMERICAN): >60 ML/MIN/1.73 M^2
GLUCOSE SERPL-MCNC: 88 MG/DL
HCT VFR BLD AUTO: 28.2 %
HGB BLD-MCNC: 8.1 G/DL
IMM GRANULOCYTES # BLD AUTO: 0.02 K/UL
IMM GRANULOCYTES NFR BLD AUTO: 0.4 %
LYMPHOCYTES # BLD AUTO: 1.5 K/UL
LYMPHOCYTES NFR BLD: 25.6 %
MAGNESIUM SERPL-MCNC: 1.7 MG/DL
MCH RBC QN AUTO: 24 PG
MCHC RBC AUTO-ENTMCNC: 28.7 G/DL
MCV RBC AUTO: 83 FL
MONOCYTES # BLD AUTO: 0.8 K/UL
MONOCYTES NFR BLD: 14.8 %
NEUTROPHILS # BLD AUTO: 3.1 K/UL
NEUTROPHILS NFR BLD: 55.1 %
NRBC BLD-RTO: 0 /100 WBC
PHOSPHATE SERPL-MCNC: 2.4 MG/DL
PLATELET # BLD AUTO: 296 K/UL
PMV BLD AUTO: 9.5 FL
POCT GLUCOSE: 118 MG/DL (ref 70–110)
POCT GLUCOSE: 120 MG/DL (ref 70–110)
POCT GLUCOSE: 124 MG/DL (ref 70–110)
POCT GLUCOSE: 83 MG/DL (ref 70–110)
POCT GLUCOSE: 92 MG/DL (ref 70–110)
POCT GLUCOSE: 93 MG/DL (ref 70–110)
POTASSIUM SERPL-SCNC: 3 MMOL/L
PROT SERPL-MCNC: 6.4 G/DL
RBC # BLD AUTO: 3.38 M/UL
SODIUM SERPL-SCNC: 139 MMOL/L
WBC # BLD AUTO: 5.67 K/UL

## 2018-12-09 PROCEDURE — 11000001 HC ACUTE MED/SURG PRIVATE ROOM

## 2018-12-09 PROCEDURE — 25000003 PHARM REV CODE 250: Performed by: NURSE PRACTITIONER

## 2018-12-09 PROCEDURE — A4216 STERILE WATER/SALINE, 10 ML: HCPCS | Performed by: NURSE PRACTITIONER

## 2018-12-09 PROCEDURE — 80053 COMPREHEN METABOLIC PANEL: CPT

## 2018-12-09 PROCEDURE — 36415 COLL VENOUS BLD VENIPUNCTURE: CPT

## 2018-12-09 PROCEDURE — 85025 COMPLETE CBC W/AUTO DIFF WBC: CPT

## 2018-12-09 PROCEDURE — 84100 ASSAY OF PHOSPHORUS: CPT

## 2018-12-09 PROCEDURE — 25000003 PHARM REV CODE 250: Performed by: PSYCHIATRY & NEUROLOGY

## 2018-12-09 PROCEDURE — 83735 ASSAY OF MAGNESIUM: CPT

## 2018-12-09 PROCEDURE — 99232 SBSQ HOSP IP/OBS MODERATE 35: CPT | Mod: ,,, | Performed by: INTERNAL MEDICINE

## 2018-12-09 PROCEDURE — 63600175 PHARM REV CODE 636 W HCPCS: Performed by: NURSE PRACTITIONER

## 2018-12-09 RX ADMIN — LEVETIRACETAM 2000 MG: 750 TABLET ORAL at 09:12

## 2018-12-09 RX ADMIN — HEPARIN SODIUM 5000 UNITS: 5000 INJECTION, SOLUTION INTRAVENOUS; SUBCUTANEOUS at 09:12

## 2018-12-09 RX ADMIN — Medication 3 ML: at 01:12

## 2018-12-09 RX ADMIN — FERROUS SULFATE TAB EC 325 MG (65 MG FE EQUIVALENT) 325 MG: 325 (65 FE) TABLET DELAYED RESPONSE at 09:12

## 2018-12-09 RX ADMIN — AMIODARONE HYDROCHLORIDE 400 MG: 200 TABLET ORAL at 09:12

## 2018-12-09 RX ADMIN — MICONAZOLE NITRATE: 20 OINTMENT TOPICAL at 09:12

## 2018-12-09 RX ADMIN — HEPARIN SODIUM 5000 UNITS: 5000 INJECTION, SOLUTION INTRAVENOUS; SUBCUTANEOUS at 06:12

## 2018-12-09 RX ADMIN — LEVOTHYROXINE SODIUM 100 MCG: 100 TABLET ORAL at 06:12

## 2018-12-09 RX ADMIN — Medication 3 ML: at 10:12

## 2018-12-09 RX ADMIN — ASPIRIN 81 MG CHEWABLE TABLET 81 MG: 81 TABLET CHEWABLE at 09:12

## 2018-12-09 RX ADMIN — DULOXETINE HYDROCHLORIDE 60 MG: 60 CAPSULE, DELAYED RELEASE ORAL at 09:12

## 2018-12-09 RX ADMIN — ACETAMINOPHEN 650 MG: 325 TABLET ORAL at 11:12

## 2018-12-09 RX ADMIN — HEPARIN SODIUM 5000 UNITS: 5000 INJECTION, SOLUTION INTRAVENOUS; SUBCUTANEOUS at 01:12

## 2018-12-09 NOTE — PLAN OF CARE
Problem: Patient Care Overview  Goal: Plan of Care Review  Outcome: Ongoing (interventions implemented as appropriate)   12/08/18 1700   Coping/Psychosocial   A/o x3 v/s stable see flow sheet telemetry mon NSR 70-78, denies SOB on 2LNC o2 sat 94-95% repositioned with wedge pillow no pressure ulcer all foam dressing sacral and both heels changed  Both arms red and swollen , 1 loose BM today black colored medium in amount pt taking Fe+ med , free of falls no injury appetite poor all extremity elevated on pillows denies any discomfort on cervical collar.    Problem: Fall Risk (Adult)  Goal: Identify Related Risk Factors and Signs and Symptoms  Related risk factors and signs and symptoms are identified upon initiation of Human Response Clinical Practice Guideline (CPG)  Outcome: Ongoing (interventions implemented as appropriate)   12/08/18 1700   Fall Risk   Related Risk Factors (Fall Risk) age-related changes;bladder function altered;depression/anxiety;fear of falling;gait/mobility problems;history of falls;homeostatic imbalance;neuro disease/injury;polypharmacy   Signs and Symptoms (Fall Risk) presence of risk factors

## 2018-12-09 NOTE — PROGRESS NOTES
Hospital Medicine  Progress note    I personally performed the history, physical exam and medical decision making: and confirmed the accuracy of the information in the transcribed note.  Zoran Morillo M.D.  Pager: [phone]  Cell Phone: [phone]    Team: [facility]OS[facility] MED B Zoran Morillo MD  Admit Date: 11/22/2018  PASTOR 12/7/2018  Code status: Full Code    Principal Problem:  Cervical spine fracture    Interval hx: NAEON. Continue on cardiac monitoring, will look for NH placement next week.     ROS   Constitutional: Negative for diaphoresis and fever.   HENT: Negative for facial swelling.    Eyes: Negative for photophobia.   Respiratory: Negative for cough, choking and chest tightness.    Cardiovascular: Negative for chest pain.   Gastrointestinal: Negative for abdominal distention and abdominal pain.   Genitourinary: Negative for difficulty urinating.   Neurological: Negative for headaches.       PEx  Temp:  [96.7 °F (35.9 °C)-98.6 °F (37 °C)]   Pulse:  [68-82]   Resp:  [16-18]   BP: (137-176)/(65-77)   SpO2:  [97 %-99 %]     Intake/Output Summary (Last 24 hours) at 12/9/2018 1438  Last data filed at 12/9/2018 1343  Gross per 24 hour   Intake 1260 ml   Output 1000 ml   Net 260 ml     Constitutional: She appears well-developed.   Eyes: Pupils are equal, round, and reactive to light.   Neck: Decreased range of motion present.   Cardiovascular: Normal rate and regular rhythm.   Pulmonary/Chest: Effort normal.   Abdominal: Normal appearance.   Neurological:   E4,V5,M6 GCS15   Cranial Nerves II - XII grossly intact   PERRLA   Reflexes and Coordination grossly intact        Recent Labs   Lab 12/07/18  0434 12/08/18  0621 12/09/18  0515   WBC 7.75 6.67 5.67   HGB 8.2* 8.1* 8.1*   HCT 28.7* 28.2* 28.2*    292 296     Recent Labs   Lab 12/07/18  0434 12/08/18  0621 12/09/18  0515    137 139   K 3.3* 3.2* 3.0*    102 102   CO2 25 27 29   BUN 7* 5* 5*   CREATININE 0.8 0.8 0.7   GLU 78 97 88   CALCIUM 8.0*  8.3* 8.2*   MG 1.3* 1.5* 1.7   PHOS 1.9* 2.2* 2.4*     Recent Labs   Lab 12/03/18  0158  12/07/18  0434 12/08/18  0621 12/09/18  0515   ALKPHOS 63   < > 71 66 65   ALT 19   < > 14 13 15   AST 44*   < > 34 26 28   ALBUMIN 2.4*   < > 2.2* 2.3* 2.3*   PROT 6.6   < > 6.4 6.3 6.4   BILITOT 0.6   < > 0.9 0.9 0.9   INR 1.2  --   --   --   --     < > = values in this interval not displayed.      Recent Labs   Lab 12/08/18  1220 12/08/18  1732 12/08/18  1736 12/08/18  2359 12/09/18  0601 12/09/18  1159   POCTGLUCOSE 127* 248* 92 118* 83 120*     No results for input(s): CPK, CPKMB, MB, TROPONINI in the last 72 hours.    Scheduled Meds:   amiodarone  400 mg Oral BID    aspirin  81 mg Oral Daily    DULoxetine  60 mg Oral Daily    ferrous sulfate  325 mg Oral BID    heparin (porcine)  5,000 Units Subcutaneous Q8H    levETIRAcetam  2,000 mg Oral BID    levothyroxine  100 mcg Oral Before breakfast    lorazepam  2 mg Intravenous Once    miconazole nitrate 2%   Topical (Top) BID    sodium chloride 0.9%  3 mL Intravenous Q8H     Continuous Infusions:  As Needed:  acetaminophen, albuterol-ipratropium, dextrose 50%, glucagon (human recombinant), insulin aspart U-100, magnesium oxide, magnesium oxide, miconazole nitrate 2%, ondansetron, potassium chloride 10%, potassium chloride 10%, potassium chloride 10%, potassium, sodium phosphates, potassium, sodium phosphates, potassium, sodium phosphates    Active Hospital Problems    Diagnosis  POA    *Cervical spine fracture [S12.9XXA]  Yes    Intertrigo [L30.4]  Yes    Cardiac arrest [I46.9]  No    Hypothyroidism [E03.9]  Yes    Dysphagia [R13.10]  Yes    Anemia of chronic disease [D63.8]  Yes    VT (ventricular tachycardia) [I47.2]  No    Hypophosphatemia [E83.39]  Yes    Restlessness and agitation [R45.1]  Yes    Endotracheally intubated [Z97.8]  Yes    [place]hes coma scale total score 9-12 [R40.2420]  Yes    Seizures [R56.9]  Yes             Altered mental status  [R41.82]  Yes      Resolved Hospital Problems    Diagnosis Date Resolved POA    Bradycardia [R00.1] 11/30/2018 No    Seborrheic keratoses [L82.1] 11/30/2018 Yes    Nipple crusting [N64.89] 11/28/2018 Yes    Leukocytosis [D72.829] 11/30/2018 Yes       Overview  73 y F transferred from [place] for cervical spine injury and seizure activity. She has pmh of HTN, DM, seizure disorder and had an unwitnessed seizure (presumed) at nursing home today followed by fall. On arrival by EMS she was responding minimally. Patient to Neuro ICU on admit for high level of care, Neurosurgery consulted and epilepsy consulted. Seoquel added with derm consultation as well. Keppra load given for seizure precautions/ppx. No surgical intervention at the moment needed per NSGY. Patient extubated appropriately and SLP, PT, OT were consulted. EEG stopped and patient stepped down to internal medicine. Continued C-collar precautions.     Assessment and Plan for Problems addressed today:    Cervical spine fracture     MRI c-spine without cord compression, contusion or hematoma  CT c-spine with minimally displaced fractures of the posterior arch of the C1 vertebral body in keeping with type 1 Sidney fracture and Type 2 odontoid fracture  C-collar in place  Per NSGY no plans to surgically intervene at the moment  Follow up NSGY for recs as outpatient      Altered mental status     Likely due to seizure activity.   EEG monitoring stopped  keppra load and maintenance keppra.          Seizures     Stop EEG  Discussed with epilepsy   continue levetiracetam  No seizures currently on EEG         Cardiac arrest     Hx of VF arrest verses unstable Torsades  Cardiac recs following appreciated   Needs cardiac workup once stable  Plan stress test  Treating seizures            Hypophosphatemia     Replete daily prn         Hypothyroidism     Continue synthroid          Dysphagia     - Swallow Test passed  - diet restarted              The patient is  being Prophylaxed for:  Venous Thromboembolism with: Mechanical or Chemical    Discharge plan and follow up    Provider    I personally scribed for Zoran Morillo MD on 12/09/2018 at 11:58 AM. Electronically signed by ana Najera III on 12/09/2018 at 11:58 AM

## 2018-12-09 NOTE — MEDICAL/APP STUDENT
Hospital Medicine  Progress note    Team: [facility] HOSP MED B Jose Najera  Admit Date: 11/22/2018  PASTOR 12/7/2018  Code status: Full Code    Principal Problem:  Cervical spine fracture    Interval hx: NAEON. Continue on cardiac monitoring, will look for NH placement next week.     ROS   Constitutional: Negative for diaphoresis and fever.   HENT: Negative for facial swelling.    Eyes: Negative for photophobia.   Respiratory: Negative for cough, choking and chest tightness.    Cardiovascular: Negative for chest pain.   Gastrointestinal: Negative for abdominal distention and abdominal pain.   Genitourinary: Negative for difficulty urinating.   Neurological: Negative for headaches.       PEx  Temp:  [96.7 °F (35.9 °C)-98.5 °F (36.9 °C)]   Pulse:  [72-78]   Resp:  [16-18]   BP: (137-176)/(65-77)   SpO2:  [97 %-99 %]     Intake/Output Summary (Last 24 hours) at 12/9/2018 1157  Last data filed at 12/9/2018 0937  Gross per 24 hour   Intake 1220 ml   Output 1000 ml   Net 220 ml     Constitutional: She appears well-developed.   Eyes: Pupils are equal, round, and reactive to light.   Neck: Decreased range of motion present.   Cardiovascular: Normal rate and regular rhythm.   Pulmonary/Chest: Effort normal.   Abdominal: Normal appearance.   Neurological:   E4,V5,M6 GCS15   Cranial Nerves II - XII grossly intact   PERRLA   Reflexes and Coordination grossly intact        Recent Labs   Lab 12/07/18  0434 12/08/18  0621 12/09/18  0515   WBC 7.75 6.67 5.67   HGB 8.2* 8.1* 8.1*   HCT 28.7* 28.2* 28.2*    292 296     Recent Labs   Lab 12/07/18  0434 12/08/18  0621 12/09/18  0515    137 139   K 3.3* 3.2* 3.0*    102 102   CO2 25 27 29   BUN 7* 5* 5*   CREATININE 0.8 0.8 0.7   GLU 78 97 88   CALCIUM 8.0* 8.3* 8.2*   MG 1.3* 1.5* 1.7   PHOS 1.9* 2.2* 2.4*     Recent Labs   Lab 12/03/18  0158  12/07/18  0434 12/08/18  0621 12/09/18  0515   ALKPHOS 63   < > 71 66 65   ALT 19   < > 14 13 15   AST 44*   < > 34 26 28   ALBUMIN  2.4*   < > 2.2* 2.3* 2.3*   PROT 6.6   < > 6.4 6.3 6.4   BILITOT 0.6   < > 0.9 0.9 0.9   INR 1.2  --   --   --   --     < > = values in this interval not displayed.      Recent Labs   Lab 12/08/18  0129 12/08/18  1220 12/08/18  1732 12/08/18  1736 12/08/18  2359 12/09/18  0601   POCTGLUCOSE 93 127* 248* 92 118* 83     No results for input(s): CPK, CPKMB, MB, TROPONINI in the last 72 hours.    Scheduled Meds:   amiodarone  400 mg Oral BID    aspirin  81 mg Oral Daily    DULoxetine  60 mg Oral Daily    ferrous sulfate  325 mg Oral BID    heparin (porcine)  5,000 Units Subcutaneous Q8H    levETIRAcetam  2,000 mg Oral BID    levothyroxine  100 mcg Oral Before breakfast    lorazepam  2 mg Intravenous Once    miconazole nitrate 2%   Topical (Top) BID    sodium chloride 0.9%  3 mL Intravenous Q8H     Continuous Infusions:  As Needed:  acetaminophen, albuterol-ipratropium, dextrose 50%, glucagon (human recombinant), insulin aspart U-100, magnesium oxide, magnesium oxide, miconazole nitrate 2%, ondansetron, potassium chloride 10%, potassium chloride 10%, potassium chloride 10%, potassium, sodium phosphates, potassium, sodium phosphates, potassium, sodium phosphates    Active Hospital Problems    Diagnosis  POA    *Cervical spine fracture [S12.9XXA]  Yes    Intertrigo [L30.4]  Yes    Cardiac arrest [I46.9]  No    Hypothyroidism [E03.9]  Yes    Dysphagia [R13.10]  Yes    Anemia of chronic disease [D63.8]  Yes    VT (ventricular tachycardia) [I47.2]  No    Hypophosphatemia [E83.39]  Yes    Restlessness and agitation [R45.1]  Yes    Endotracheally intubated [Z97.8]  Yes    [place]inn coma scale total score 9-12 [R40.2420]  Yes    Seizures [R56.9]  Yes             Altered mental status [R41.82]  Yes      Resolved Hospital Problems    Diagnosis Date Resolved POA    Bradycardia [R00.1] 11/30/2018 No    Seborrheic keratoses [L82.1] 11/30/2018 Yes    Nipple crusting [N64.89] 11/28/2018 Yes    Leukocytosis  [D72.829] 11/30/2018 Yes       Overview  73 y F transferred from [place] for cervical spine injury and seizure activity. She has pmh of HTN, DM, seizure disorder and had an unwitnessed seizure (presumed) at nursing home today followed by fall. On arrival by EMS she was responding minimally. Patient to Neuro ICU on admit for high level of care, Neurosurgery consulted and epilepsy consulted. Seoquel added with derm consultation as well. Keppra load given for seizure precautions/ppx. No surgical intervention at the moment needed per NSGY. Patient extubated appropriately and SLP, PT, OT were consulted. EEG stopped and patient stepped down to internal medicine. Continued C-collar precautions.     Assessment and Plan for Problems addressed today:    Cervical spine fracture     MRI c-spine without cord compression, contusion or hematoma  CT c-spine with minimally displaced fractures of the posterior arch of the C1 vertebral body in keeping with type 1 Sidney fracture and Type 2 odontoid fracture  C-collar in place  Per NSGY no plans to surgically intervene at the moment  Follow up NSGY for recs as outpatient      Altered mental status     Likely due to seizure activity.   EEG monitoring stopped  keppra load and maintenance keppra.          Seizures     Stop EEG  Discussed with epilepsy   continue levetiracetam  No seizures currently on EEG         Cardiac arrest     Hx of VF arrest verses unstable Torsades  Cardiac recs following appreciated   Needs cardiac workup once stable  Plan stress test  Treating seizures            Hypophosphatemia     Replete daily prn         Hypothyroidism     Continue synthroid          Dysphagia     - Swallow Test passed  - diet restarted              The patient is being Prophylaxed for:  Venous Thromboembolism with: Mechanical or Chemical    Discharge plan and follow up    Provider    I personally scribed for Zoran Morillo MD on 12/09/2018 at 11:58 AM. Electronically signed by ana  Jose Najera III on 12/09/2018 at 11:58 AM

## 2018-12-09 NOTE — PLAN OF CARE
Problem: Patient Care Overview  Goal: Plan of Care Review  Outcome: Ongoing (interventions implemented as appropriate)  Patient had no falls. She did receive pain medication once. She remained AAOx4. Medication and nursing care given per MD order.

## 2018-12-10 VITALS
OXYGEN SATURATION: 95 % | TEMPERATURE: 98 F | HEART RATE: 74 BPM | SYSTOLIC BLOOD PRESSURE: 151 MMHG | DIASTOLIC BLOOD PRESSURE: 70 MMHG | RESPIRATION RATE: 20 BRPM | WEIGHT: 204 LBS | BODY MASS INDEX: 36.14 KG/M2 | HEIGHT: 63 IN

## 2018-12-10 LAB
ALBUMIN SERPL BCP-MCNC: 2.1 G/DL
ALP SERPL-CCNC: 68 U/L
ALT SERPL W/O P-5'-P-CCNC: 15 U/L
ANION GAP SERPL CALC-SCNC: 8 MMOL/L
ANISOCYTOSIS BLD QL SMEAR: SLIGHT
AST SERPL-CCNC: 32 U/L
BASOPHILS # BLD AUTO: 0.06 K/UL
BASOPHILS NFR BLD: 1.2 %
BILIRUB SERPL-MCNC: 0.8 MG/DL
BUN SERPL-MCNC: 5 MG/DL
CALCIUM SERPL-MCNC: 8.4 MG/DL
CHLORIDE SERPL-SCNC: 100 MMOL/L
CO2 SERPL-SCNC: 29 MMOL/L
CREAT SERPL-MCNC: 0.7 MG/DL
DIFFERENTIAL METHOD: ABNORMAL
EOSINOPHIL # BLD AUTO: 0.1 K/UL
EOSINOPHIL NFR BLD: 2.7 %
ERYTHROCYTE [DISTWIDTH] IN BLOOD BY AUTOMATED COUNT: 21.7 %
EST. GFR  (AFRICAN AMERICAN): >60 ML/MIN/1.73 M^2
EST. GFR  (NON AFRICAN AMERICAN): >60 ML/MIN/1.73 M^2
GLUCOSE SERPL-MCNC: 83 MG/DL
HCT VFR BLD AUTO: 28.8 %
HGB BLD-MCNC: 8.3 G/DL
HYPOCHROMIA BLD QL SMEAR: ABNORMAL
IMM GRANULOCYTES # BLD AUTO: 0.02 K/UL
IMM GRANULOCYTES NFR BLD AUTO: 0.4 %
LYMPHOCYTES # BLD AUTO: 1.5 K/UL
LYMPHOCYTES NFR BLD: 29.4 %
MAGNESIUM SERPL-MCNC: 1.5 MG/DL
MCH RBC QN AUTO: 24.5 PG
MCHC RBC AUTO-ENTMCNC: 28.8 G/DL
MCV RBC AUTO: 85 FL
MONOCYTES # BLD AUTO: 0.8 K/UL
MONOCYTES NFR BLD: 14.5 %
NEUTROPHILS # BLD AUTO: 2.7 K/UL
NEUTROPHILS NFR BLD: 51.8 %
NRBC BLD-RTO: 0 /100 WBC
PHOSPHATE SERPL-MCNC: 2 MG/DL
PLATELET # BLD AUTO: 299 K/UL
PMV BLD AUTO: 9.6 FL
POCT GLUCOSE: 181 MG/DL (ref 70–110)
POCT GLUCOSE: 211 MG/DL (ref 70–110)
POCT GLUCOSE: 86 MG/DL (ref 70–110)
POCT GLUCOSE: 86 MG/DL (ref 70–110)
POIKILOCYTOSIS BLD QL SMEAR: SLIGHT
POLYCHROMASIA BLD QL SMEAR: ABNORMAL
POTASSIUM SERPL-SCNC: 2.8 MMOL/L
PROT SERPL-MCNC: 6.4 G/DL
RBC # BLD AUTO: 3.39 M/UL
SMUDGE CELLS BLD QL SMEAR: PRESENT
SODIUM SERPL-SCNC: 137 MMOL/L
STOMATOCYTES BLD QL SMEAR: PRESENT
TARGETS BLD QL SMEAR: ABNORMAL
WBC # BLD AUTO: 5.17 K/UL

## 2018-12-10 PROCEDURE — 25000003 PHARM REV CODE 250: Performed by: INTERNAL MEDICINE

## 2018-12-10 PROCEDURE — 25000003 PHARM REV CODE 250: Performed by: NURSE PRACTITIONER

## 2018-12-10 PROCEDURE — 63600175 PHARM REV CODE 636 W HCPCS: Performed by: NURSE PRACTITIONER

## 2018-12-10 PROCEDURE — 80053 COMPREHEN METABOLIC PANEL: CPT

## 2018-12-10 PROCEDURE — A4216 STERILE WATER/SALINE, 10 ML: HCPCS | Performed by: NURSE PRACTITIONER

## 2018-12-10 PROCEDURE — 85025 COMPLETE CBC W/AUTO DIFF WBC: CPT

## 2018-12-10 PROCEDURE — 99238 HOSP IP/OBS DSCHRG MGMT 30/<: CPT | Mod: ,,, | Performed by: INTERNAL MEDICINE

## 2018-12-10 PROCEDURE — 97530 THERAPEUTIC ACTIVITIES: CPT

## 2018-12-10 PROCEDURE — 11000001 HC ACUTE MED/SURG PRIVATE ROOM

## 2018-12-10 PROCEDURE — 83735 ASSAY OF MAGNESIUM: CPT

## 2018-12-10 PROCEDURE — 84100 ASSAY OF PHOSPHORUS: CPT

## 2018-12-10 PROCEDURE — 36415 COLL VENOUS BLD VENIPUNCTURE: CPT

## 2018-12-10 PROCEDURE — 97116 GAIT TRAINING THERAPY: CPT

## 2018-12-10 PROCEDURE — 25000003 PHARM REV CODE 250: Performed by: PSYCHIATRY & NEUROLOGY

## 2018-12-10 RX ORDER — AMIODARONE HYDROCHLORIDE 400 MG/1
400 TABLET ORAL 2 TIMES DAILY
Qty: 60 TABLET | Refills: 11
Start: 2018-12-10 | End: 2019-12-10

## 2018-12-10 RX ORDER — POTASSIUM CHLORIDE 20 MEQ/15ML
20 SOLUTION ORAL 2 TIMES DAILY
Qty: 473 ML | Refills: 0
Start: 2018-12-10

## 2018-12-10 RX ORDER — POTASSIUM CHLORIDE 20 MEQ/15ML
20 SOLUTION ORAL 2 TIMES DAILY
Status: DISPENSED | OUTPATIENT
Start: 2018-12-10 | End: 2018-12-11

## 2018-12-10 RX ADMIN — LEVOTHYROXINE SODIUM 100 MCG: 100 TABLET ORAL at 06:12

## 2018-12-10 RX ADMIN — DULOXETINE HYDROCHLORIDE 60 MG: 60 CAPSULE, DELAYED RELEASE ORAL at 08:12

## 2018-12-10 RX ADMIN — HEPARIN SODIUM 5000 UNITS: 5000 INJECTION, SOLUTION INTRAVENOUS; SUBCUTANEOUS at 01:12

## 2018-12-10 RX ADMIN — HEPARIN SODIUM 5000 UNITS: 5000 INJECTION, SOLUTION INTRAVENOUS; SUBCUTANEOUS at 06:12

## 2018-12-10 RX ADMIN — ASPIRIN 81 MG CHEWABLE TABLET 81 MG: 81 TABLET CHEWABLE at 08:12

## 2018-12-10 RX ADMIN — AMIODARONE HYDROCHLORIDE 400 MG: 200 TABLET ORAL at 08:12

## 2018-12-10 RX ADMIN — LEVETIRACETAM 2000 MG: 750 TABLET ORAL at 08:12

## 2018-12-10 RX ADMIN — FERROUS SULFATE TAB EC 325 MG (65 MG FE EQUIVALENT) 325 MG: 325 (65 FE) TABLET DELAYED RESPONSE at 08:12

## 2018-12-10 RX ADMIN — MICONAZOLE NITRATE: 20 OINTMENT TOPICAL at 08:12

## 2018-12-10 RX ADMIN — Medication 3 ML: at 06:12

## 2018-12-10 RX ADMIN — INSULIN ASPART 4 UNITS: 100 INJECTION, SOLUTION INTRAVENOUS; SUBCUTANEOUS at 06:12

## 2018-12-10 RX ADMIN — POTASSIUM CHLORIDE 20 MEQ: 20 SOLUTION ORAL at 08:12

## 2018-12-10 NOTE — PLAN OF CARE
12/10/18 1145   Post-Acute Status   Post-Acute Authorization Placement   Post-Acute Placement Status Authorization Obtained

## 2018-12-10 NOTE — PLAN OF CARE
[facility]     Department of Hospital Medicine     [address] [place] [address]     [phone] after hours  [phone] fax       NURSING HOME ORDERS    Patient Name: Melania Malagon  YOB: 1945    12/10/2018    Admit to Nursing Home:  Skilled Bed      Diagnoses:  Active Hospital Problems    Diagnosis  POA    *Cervical spine fracture [S12.9XXA]  Yes    Intertrigo [L30.4]  Yes    Cardiac arrest [I46.9]  No    Hypothyroidism [E03.9]  Yes    Dysphagia [R13.10]  Yes    Anemia of chronic disease [D63.8]  Yes    VT (ventricular tachycardia) [I47.2]  No    Hypophosphatemia [E83.39]  Yes    Restlessness and agitation [R45.1]  Yes    Endotracheally intubated [Z97.8]  Yes    [place]an coma scale total score 9-12 [R40.2420]  Yes    Seizures [R56.9]  Yes             Altered mental status [R41.82]  Yes      Resolved Hospital Problems    Diagnosis Date Resolved POA    Bradycardia [R00.1] 11/30/2018 No    Seborrheic keratoses [L82.1] 11/30/2018 Yes    Nipple crusting [N64.89] 11/28/2018 Yes    Leukocytosis [D72.829] 11/30/2018 Yes       Patient is homebound due to:  Cervical spine fracture    Allergies:  Review of patient's allergies indicates:   Allergen Reactions    Phenobarbital     Sulfa (sulfonamide antibiotics)     Tegretol [carbamazepine]        Vitals:       Routine, once monthly        Diet: soft diet                               Acitivities:  - Up in a chair each morning as tolerated          LABS:  Per facility protocol    Nursing Precautions    C-collar stays in place until follow up with [id number]                   Melania Malagon   Home Medication Instructions BUCKY:[id number]    Printed on:12/10/18 8613   Medication Information                      amiodarone (PACERONE) 400 MG tablet  Take 1 tablet (400 mg total) by mouth 2 (two) times daily.             amLODIPine (NORVASC) 5 MG tablet  Take 5 mg by mouth once daily.              DULoxetine (CYMBALTA) 60 MG capsule  Take 60 mg by mouth once daily.             levETIRAcetam (KEPPRA) 750 MG Tab  Take 1,500 mg by mouth 2 (two) times daily.             levothyroxine (SYNTHROID) 100 MCG tablet  Take 100 mcg by mouth once daily.             metFORMIN (GLUCOPHAGE) 500 MG tablet  Take 500 mg by mouth 2 (two) times daily with meals.             potassium chloride 10% (KAYCIEL) 20 mEq/15 mL oral solution  Take 15 mLs (20 mEq total) by mouth 2 (two) times daily.             propranolol (INDERAL) 20 MG tablet  Take 20 mg by mouth 2 (two) times daily.                       _________________________________  Zoran Morillo MD  12/10/2018

## 2018-12-10 NOTE — PLAN OF CARE
LES sent updated progress notes and MAR via [facility] from weekend to [facility].    LES spoke with Mariela with [facility] ([phone] or cell [phone]). They have no transport for today but can be here at 7am tomorrow.     LES spoke with Mariela who reported their  does have a opening today to come  the Pt. The  can leave at 3pm so that puts eta of 4pm-5pm. LES reported this is fine. She needs orders and any narcotics scripts so their pharmacy can fill them. LES advised CM.    Nabila Anguiano LMSW  Neurocritical Care   [facility]  [id number]

## 2018-12-10 NOTE — PT/OT/SLP PROGRESS
"Physical Therapy Treatment    Patient Name:  Melania Malagon   MRN:  [id number]    Recommendations:     Discharge Recommendations:  nursing facility, basic, home health PT  Discharge Equipment Recommendations: none   Barriers to discharge: None    Assessment:     Melania Malagon is a 73 y.o. female admitted with a medical diagnosis of Cervical spine fracture.  She presents with the following impairments/functional limitations:  weakness, impaired endurance, impaired self care skills, impaired functional mobilty, gait instability, impaired balance, orthopedic precautions. Pt tolerated session well with focus on bed mobility, transfers, and gait training. Pt progressing well with all mobility this day with pt tolerating short gait trial and performing bed mobility with CGA/Min A. Pt will continue to benefit from therapy services to improve impairments listed above.     Rehab Prognosis:  Good ; patient would benefit from acute skilled PT services to address these deficits and reach maximum level of function.      Recent Surgery: * No surgery found *      Plan:     During this hospitalization, patient to be seen 3 x/week to address the above listed problems via gait training, therapeutic activities, therapeutic exercises, neuromuscular re-education  · Plan of Care Expires:  01/05/19   Plan of Care Reviewed with: patient    Subjective     Communicated with NSG prior to session.  Patient found supine upon PTA entry to room, agreeable to treatment.      Chief Complaint: no c/o  Patient comments/goals: "The neck only gets sore every so often, but I'm alright right now."   Pain/Comfort:  · Pain Rating 1: 0/10  · Pain Rating Post-Intervention 1: 0/10    Patients cultural, spiritual, Buddhism conflicts given the current situation: none stated    Objective:     Patient found with: telemetry, [employer], oxygen     General Precautions: Standard, fall   Orthopedic Precautions:spinal precautions   Braces: Cervical collar     Functional " Mobility:  · Bed Mobility:     · Rolling Left:  contact guard assistance  · Scooting: contact guard assistance  · Supine to Sit: minimum assistance  · Transfers:     · Sit to Stand:  contact guard assistance with rolling walker  · Bed to Chair: minimum assistance with  rolling walker  using  Step Transfer  · Gait: Pt ambulates ~ 10 ft in room with RW and Min A. Pt with increased lateral weight shift to L requiring minimal assistance to prevent LOB. Pt fatigues towards end of trial with L knee buckling noted. Pt sits to chair safely.   · Balance: Pt sits with SBA/CGA. Pt stands with Min A.       AM-PAC 6 CLICK MOBILITY  Turning over in bed (including adjusting bedclothes, sheets and blankets)?: 3  Sitting down on and standing up from a chair with arms (e.g., wheelchair, bedside commode, etc.): 3  Moving from lying on back to sitting on the side of the bed?: 3  Moving to and from a bed to a chair (including a wheelchair)?: 2  Need to walk in hospital room?: 2  Climbing 3-5 steps with a railing?: 1  Basic Mobility Total Score: 14       Therapeutic Activities and Exercises:  Pt assisted with functional mobility as noted above.   Pt sits EOB ~ 8 minutes statically with BUE used for support requiring SBA/CGA.   Pt educated on safety with all mobility, calling for assistance, spinal precautions and PT POC.     Patient left up in chair with all lines intact, call button in reach and NSG notified.    GOALS:   Multidisciplinary Problems     Physical Therapy Goals        Problem: Physical Therapy Goal    Goal Priority Disciplines Outcome Goal Variances Interventions   Physical Therapy Goal     PT, PT/OT Ongoing (interventions implemented as appropriate)     Description:  Goals to be met by: 10 days ()    Patient will increase functional independence with mobility by performin. Supine to sit with Stand-by Assistance  2. Sit to supine with Stand-by Assistance  3. Sit to stand transfer with Stand-by Assistance  4.  Gait  x 20 feet with Moderate Assistance using Rolling Walker.   5. Lower extremity exercise program x30 reps per handout, with supervision to assist with improvement of muscular strength                          Time Tracking:     PT Received On: 12/10/18  PT Start Time: 0846     PT Stop Time: 0910  PT Total Time (min): 24 min     Billable Minutes: Gait Training 10 and Therapeutic Activity 14    Treatment Type: Treatment  PT/PTA: PTA     PTA Visit Number: 1     Ton Schulz, CARY  12/10/2018

## 2018-12-10 NOTE — MEDICAL/APP STUDENT
Hospital Medicine  Progress note    Team: [facility] Jose Najera  Admit Date: 11/22/2018  PASTOR 12/7/2018  Code status: Full Code    Principal Problem:  Cervical spine fracture    Interval hx: Patient potassium low, replaced.     ROS   Constitutional: Negative for diaphoresis and fever.   HENT: Negative for facial swelling.    Eyes: Negative for photophobia.   Respiratory: Negative for cough, choking and chest tightness.    Cardiovascular: Negative for chest pain.   Gastrointestinal: Negative for abdominal distention and abdominal pain.   Genitourinary: Negative for difficulty urinating.   Neurological: Negative for headaches.       PEx  Temp:  [98 °F (36.7 °C)-98.6 °F (37 °C)]   Pulse:  [67-82]   Resp:  [15-20]   BP: (129-168)/(60-72)   SpO2:  [97 %-98 %]     Intake/Output Summary (Last 24 hours) at 12/10/2018 0731  Last data filed at 12/10/2018 0600  Gross per 24 hour   Intake 1080 ml   Output 1500 ml   Net -420 ml     Constitutional: She appears well-developed.   Eyes: Pupils are equal, round, and reactive to light.   Neck: Decreased range of motion present.   Cardiovascular: Normal rate and regular rhythm.   Pulmonary/Chest: Effort normal.   Abdominal: Normal appearance.   Neurological:   E4,V5,M6 GCS15   Cranial Nerves II - XII grossly intact   PERRLA   Reflexes and Coordination grossly intact        Recent Labs   Lab 12/08/18 0621 12/09/18 0515 12/10/18  0439   WBC 6.67 5.67 5.17   HGB 8.1* 8.1* 8.3*   HCT 28.2* 28.2* 28.8*    296 299     Recent Labs   Lab 12/08/18 0621 12/09/18  0515 12/10/18  0439    139 137   K 3.2* 3.0* 2.8*    102 100   CO2 27 29 29   BUN 5* 5* 5*   CREATININE 0.8 0.7 0.7   GLU 97 88 83   CALCIUM 8.3* 8.2* 8.4*   MG 1.5* 1.7 1.5*   PHOS 2.2* 2.4* 2.0*     Recent Labs   Lab 12/08/18 0621 12/09/18  0515 12/10/18  0439   ALKPHOS 66 65 68   ALT 13 15 15   AST 26 28 32   ALBUMIN 2.3* 2.3* 2.1*   PROT 6.3 6.4 6.4   BILITOT 0.9 0.9 0.8      Recent Labs   Lab  12/08/18  2359 12/09/18  0601 12/09/18  1159 12/09/18  1734 12/09/18  2352 12/10/18  0621   POCTGLUCOSE 118* 83 120* 124* 86 86     No results for input(s): CPK, CPKMB, MB, TROPONINI in the last 72 hours.    Scheduled Meds:   amiodarone  400 mg Oral BID    aspirin  81 mg Oral Daily    DULoxetine  60 mg Oral Daily    ferrous sulfate  325 mg Oral BID    heparin (porcine)  5,000 Units Subcutaneous Q8H    levETIRAcetam  2,000 mg Oral BID    levothyroxine  100 mcg Oral Before breakfast    lorazepam  2 mg Intravenous Once    miconazole nitrate 2%   Topical (Top) BID    sodium chloride 0.9%  3 mL Intravenous Q8H     Continuous Infusions:  As Needed:  acetaminophen, albuterol-ipratropium, dextrose 50%, glucagon (human recombinant), insulin aspart U-100, magnesium oxide, magnesium oxide, miconazole nitrate 2%, ondansetron, potassium chloride 10%, potassium chloride 10%, potassium chloride 10%, potassium, sodium phosphates, potassium, sodium phosphates, potassium, sodium phosphates    Active Hospital Problems    Diagnosis  POA    *Cervical spine fracture [S12.9XXA]  Yes    Intertrigo [L30.4]  Yes    Cardiac arrest [I46.9]  No    Hypothyroidism [E03.9]  Yes    Dysphagia [R13.10]  Yes    Anemia of chronic disease [D63.8]  Yes    VT (ventricular tachycardia) [I47.2]  No    Hypophosphatemia [E83.39]  Yes    Restlessness and agitation [R45.1]  Yes    Endotracheally intubated [Z97.8]  Yes    [place] coma scale total score 9-12 [R40.2420]  Yes    Seizures [R56.9]  Yes             Altered mental status [R41.82]  Yes      Resolved Hospital Problems    Diagnosis Date Resolved POA    Bradycardia [R00.1] 11/30/2018 No    Seborrheic keratoses [L82.1] 11/30/2018 Yes    Nipple crusting [N64.89] 11/28/2018 Yes    Leukocytosis [D72.829] 11/30/2018 Yes       Overview  73 y F transferred from [place] for cervical spine injury and seizure activity. She has pmh of HTN, DM, seizure disorder and had an unwitnessed seizure  (presumed) at nursing home today followed by fall. On arrival by EMS she was responding minimally. Patient to Neuro ICU on admit for high level of care, Neurosurgery consulted and epilepsy consulted. Seodoreenl added with derm consultation as well. Keppra load given for seizure precautions/ppx. No surgical intervention at the moment needed per NSGY. Patient extubated appropriately and SLP, PT, OT were consulted. EEG stopped and patient stepped down to internal medicine. Continued C-collar precautions.     Assessment and Plan for Problems addressed today:    Cervical spine fracture     MRI c-spine without cord compression, contusion or hematoma  CT c-spine with minimally displaced fractures of the posterior arch of the C1 vertebral body in keeping with type 1 [place] fracture and Type 2 odontoid fracture  C-collar in place  Per NSGY no plans to surgically intervene at the moment  Follow up NSGY for recs as outpatient      Altered mental status     Likely due to seizure activity.   EEG monitoring stopped  keppra load and maintenance keppra.          Seizures     Stop EEG  Discussed with epilepsy   continue levetiracetam  No seizures currently on EEG         Cardiac arrest     Hx of VF arrest verses unstable Torsades  Cardiac recs following appreciated   Needs cardiac workup once stable  Plan stress test  Treating seizures            Hypophosphatemia     Replete daily prn         Hypothyroidism     Continue synthroid          Dysphagia     - Swallow Test passed  - diet restarted              The patient is being Prophylaxed for:  Venous Thromboembolism with: Mechanical or Chemical    Discharge plan and follow up    Provider

## 2018-12-10 NOTE — PROGRESS NOTES
Report called to [facility], IV's removed x 2, catheter tip intact. Pt waiting for transport. Will continue to monitor pt.

## 2018-12-10 NOTE — PROGRESS NOTES
Notified Dr. Morillo with IMB pts K=2.8. Stated he will place orders soon. Will continue to monitor pt.

## 2018-12-10 NOTE — DISCHARGE SUMMARY
Discharge Summary  Hospital Medicine    I personally performed the history, physical exam and medical decision making: and confirmed the accuracy of the information in the transcribed note.  Zoran Morillo M.D.  Pager: [phone]  Cell Phone: [phone]    Attending Provider on Discharge: Zoran Morillo MD  Hospital Medicine Team: [facility]  Date of Admission:  11/22/2018     Date of Discharge:    Code status: Full Code    Active Hospital Problems    Diagnosis  POA    *Cervical spine fracture [S12.9XXA]  Yes    Intertrigo [L30.4]  Yes    Cardiac arrest [I46.9]  No    Hypothyroidism [E03.9]  Yes    Dysphagia [R13.10]  Yes    Anemia of chronic disease [D63.8]  Yes    VT (ventricular tachycardia) [I47.2]  No    Hypophosphatemia [E83.39]  Yes    Restlessness and agitation [R45.1]  Yes    Endotracheally intubated [Z97.8]  Yes    [place] coma scale total score 9-12 [R40.2420]  Yes    Seizures [R56.9]  Yes             Altered mental status [R41.82]  Yes      Resolved Hospital Problems    Diagnosis Date Resolved POA    Bradycardia [R00.1] 11/30/2018 No    Seborrheic keratoses [L82.1] 11/30/2018 Yes    Nipple crusting [N64.89] 11/28/2018 Yes    Leukocytosis [D72.829] 11/30/2018 Yes        HPI  73 y F transferred from [place] for cervical spine injury and seizure activity. She has pmh of HTN, DM, seizure disorder and had an unwitnessed seizure (presumed) at nursing home today followed by fall. On arrival by EMS she was responding minimally. Did not require CPR. She had one seizure en route to hospital at [place] and received versed. At ER at OSH, patient had another witnessed seizure with tonic clonic activity in extremities and became unresponsive. She was intubated, and given iv fosphenytoin.  CTH was negative. CT c-spine showed odontoid fracture and C1 fracture. She received versed and fosphenytoin and OSH, when she was transferred by helicopter to [facility], en route she was started on ketamine gtt at 0.5  mcg/kg/min. She was transferred to [facility] for cervical fracture requiring neurosurg intervention and also seizure monitoring and control.     TO note  Patient had recent pneumonia and GI bleed earlier this month and was treated at OSH. She was discharged on 11/20 with levaquin for pneumonia and returned to NH     Hospital Course  73 y F transferred from [place] for cervical spine injury and seizure activity. She has pmh of HTN, DM, seizure disorder and had an unwitnessed seizure (presumed) at nursing home today followed by fall. On arrival by EMS she was responding minimally. Patient to Neuro ICU on admit for high level of care, Neurosurgery consulted and epilepsy consulted. Seoquel added with derm consultation as well. Keppra load given for seizure precautions/ppx. No surgical intervention at the moment needed per NSGY. Patient extubated appropriately and SLP, PT, OT were consulted. EEG stopped and patient stepped down to internal medicine. Continued C-collar precautions. Potassium replaced throughout admission. Patient discharged to return to nursing home.     Recent Labs   Lab 12/08/18  0621 12/09/18  0515 12/10/18  0439   WBC 6.67 5.67 5.17   HGB 8.1* 8.1* 8.3*   HCT 28.2* 28.2* 28.8*    296 299     Recent Labs   Lab 12/08/18  0621 12/09/18  0515 12/10/18  0439    139 137   K 3.2* 3.0* 2.8*    102 100   CO2 27 29 29   BUN 5* 5* 5*   CREATININE 0.8 0.7 0.7   GLU 97 88 83   CALCIUM 8.3* 8.2* 8.4*   MG 1.5* 1.7 1.5*   PHOS 2.2* 2.4* 2.0*     Recent Labs   Lab 12/08/18  0621 12/09/18  0515 12/10/18  0439   ALKPHOS 66 65 68   ALT 13 15 15   AST 26 28 32   ALBUMIN 2.3* 2.3* 2.1*   PROT 6.3 6.4 6.4   BILITOT 0.9 0.9 0.8      Recent Labs   Lab 12/09/18  0601 12/09/18  1159 12/09/18  1734 12/09/18  2352 12/10/18  0621 12/10/18  1133   POCTGLUCOSE 83 120* 124* 86 86 181*     No results for input(s): CPK, CPKMB, MB, TROPONINI in the last 72 hours.    No results for input(s): LACTATE in the last 72 hours.      Procedures: 24-hour video EEG    Consultants: Neurosurgery: C-spine injury, Respiratory therapy: vent, Physical medicine and rehab: c-spine injury, Nutrition: diet, Vascular neurology/Epilepsy: EEG reading, PT/OT: evaluate and treat, Cardiology:  Cardiac arrest, Wound care: thigh wounds, Speech: diet and speech goals     Current Discharge Medication List      START taking these medications    Details   amiodarone (PACERONE) 400 MG tablet Take 1 tablet (400 mg total) by mouth 2 (two) times daily.  Qty: 60 tablet, Refills: 11      potassium chloride 10% (KAYCIEL) 20 mEq/15 mL oral solution Take 15 mLs (20 mEq total) by mouth 2 (two) times daily.  Qty: 473 mL, Refills: 0         CONTINUE these medications which have NOT CHANGED    Details   amLODIPine (NORVASC) 5 MG tablet Take 5 mg by mouth once daily.      DULoxetine (CYMBALTA) 60 MG capsule Take 60 mg by mouth once daily.      levETIRAcetam (KEPPRA) 750 MG Tab Take 1,500 mg by mouth 2 (two) times daily.      levothyroxine (SYNTHROID) 100 MCG tablet Take 100 mcg by mouth once daily.      metFORMIN (GLUCOPHAGE) 500 MG tablet Take 500 mg by mouth 2 (two) times daily with meals.      propranolol (INDERAL) 20 MG tablet Take 20 mg by mouth 2 (two) times daily.             Discharge Diet:soft diet with Normal Fluid intake of 1500 - 2000 mL per day    Activity: activity as tolerated    Discharge Condition: Good    Disposition: Nursing Facility    Tests pending at the time of discharge: none      Time spent  on the discharge of the patient including review of hospital course with the patient. reviewing discharge medications and arranging follow-up care     Discharge examination Patient was seen and examined on the date of discharge and determined to be suitable for discharge.    Discharge plan and follow up:      Future Appointments   Date Time Provider Department Center   1/15/2019 11:00 AM Mariano Arredondo PA-C [facility]       Provider    I personally  scribed for Zoran Morillo MD on 12/10/2018 at 9:30 AM. Electronically signed by ana Najera III on 12/10/2018 at 9:30 AM

## 2018-12-10 NOTE — PLAN OF CARE
Problem: Physical Therapy Goal  Goal: Physical Therapy Goal  Goals to be met by: 10 days ()    Patient will increase functional independence with mobility by performin. Supine to sit with Stand-by Assistance  2. Sit to supine with Stand-by Assistance  3. Sit to stand transfer with Stand-by Assistance  4. Gait  x 20 feet with Moderate Assistance using Rolling Walker.   5. Lower extremity exercise program x30 reps per handout, with supervision to assist with improvement of muscular strength         Outcome: Ongoing (interventions implemented as appropriate)  Goals remain appropriate.

## 2018-12-10 NOTE — PLAN OF CARE
Problem: Fall Risk (Adult)  Goal: Identify Related Risk Factors and Signs and Symptoms  Related risk factors and signs and symptoms are identified upon initiation of Human [employer] Clinical Practice Guideline (CPG)  Outcome: Ongoing (interventions implemented as appropriate)   12/08/18 1700   (RETIRED) Fall Risk   (RETIRED) Related Risk Factors (Fall Risk) age-related changes;bladder function altered;depression/anxiety;fear of falling;gait/mobility problems;history of falls;homeostatic imbalance;neuro disease/injury;polypharmacy   (RETIRED) Signs and Symptoms (Fall Risk) presence of risk factors     Goal: Absence of Falls  Patient will demonstrate the desired outcomes by discharge/transition of care.  Outcome: Ongoing (interventions implemented as appropriate)   11/29/18 1257   (RETIRED) Fall Risk (Adult)   (RETIRED) Absence of Falls making progress toward outcome

## 2018-12-10 NOTE — PLAN OF CARE
Problem: Patient Care Overview  Goal: Plan of Care Review  Patient had no falls. She remained AAOx4. Medication and nursing care per MD order. No acute events overnight.

## 2018-12-10 NOTE — PLAN OF CARE
LES received orders and sent to NH. Contacted Mariela to report it is in. She reported the only thing left needed is a follow up to be scheduled with Neurosurgery at [facility]. They will take her to the appointment. ETA on departure will be 4-5pm. LES will contact her with the appointment time and to obtain the report information for the RN.    LES spoke with Mariela to give NS follow up appt time as 1/15/19 at 11am. She reported the  will leave the facility once they obtain the report. RN can call and report at [phone]. LES contacted RN to give the information above.    Nabila Anguiano, JOANN  Neurocritical Care   [facility]  [id number]

## 2018-12-10 NOTE — MEDICAL/APP STUDENT
Discharge Summary  Hospital Medicine    Attending Provider on Discharge: [facility] Team: [facility]OSP MED B  Date of Admission:  11/22/2018     Date of Discharge:    Code status: Full Code    Active Hospital Problems    Diagnosis  POA    *Cervical spine fracture [S12.9XXA]  Yes    Intertrigo [L30.4]  Yes    Cardiac arrest [I46.9]  No    Hypothyroidism [E03.9]  Yes    Dysphagia [R13.10]  Yes    Anemia of chronic disease [D63.8]  Yes    VT (ventricular tachycardia) [I47.2]  No    Hypophosphatemia [E83.39]  Yes    Restlessness and agitation [R45.1]  Yes    Endotracheally intubated [Z97.8]  Yes    [place] coma scale total score 9-12 [R40.2420]  Yes    Seizures [R56.9]  Yes             Altered mental status [R41.82]  Yes      Resolved Hospital Problems    Diagnosis Date Resolved POA    Bradycardia [R00.1] 11/30/2018 No    Seborrheic keratoses [L82.1] 11/30/2018 Yes    Nipple crusting [N64.89] 11/28/2018 Yes    Leukocytosis [D72.829] 11/30/2018 Yes        HPI  73 y F transferred from [place] for cervical spine injury and seizure activity. She has pmh of HTN, DM, seizure disorder and had an unwitnessed seizure (presumed) at nursing home today followed by fall. On arrival by EMS she was responding minimally. Did not require CPR. She had one seizure en route to hospital at [place] and received versed. At ER at OSH, patient had another witnessed seizure with tonic clonic activity in extremities and became unresponsive. She was intubated, and given iv fosphenytoin.  CTH was negative. CT c-spine showed odontoid fracture and C1 fracture. She received versed and fosphenytoin and OSH, when she was transferred by helicopter to [facility], en route she was started on ketamine gtt at 0.5 mcg/kg/min. She was transferred to [facility] for cervical fracture requiring neurosurg intervention and also seizure monitoring and control.     TO note  Patient had recent pneumonia and GI bleed earlier this month and was  treated at OSH. She was discharged on 11/20 with levaquin for pneumonia and returned to NH     Hospital Course  73 y F transferred from [place] for cervical spine injury and seizure activity. She has pmh of HTN, DM, seizure disorder and had an unwitnessed seizure (presumed) at nursing home today followed by fall. On arrival by EMS she was responding minimally. Patient to Neuro ICU on admit for high level of care, Neurosurgery consulted and epilepsy consulted. Seoquel added with derm consultation as well. Keppra load given for seizure precautions/ppx. No surgical intervention at the moment needed per NSGY. Patient extubated appropriately and SLP, PT, OT were consulted. EEG stopped and patient stepped down to internal medicine. Continued C-collar precautions. Potassium replaced throughout admission. Patient discharged to return to nursing home.     Recent Labs   Lab 12/08/18 0621 12/09/18  0515 12/10/18  0439   WBC 6.67 5.67 5.17   HGB 8.1* 8.1* 8.3*   HCT 28.2* 28.2* 28.8*    296 299     Recent Labs   Lab 12/08/18 0621 12/09/18  0515 12/10/18  0439    139 137   K 3.2* 3.0* 2.8*    102 100   CO2 27 29 29   BUN 5* 5* 5*   CREATININE 0.8 0.7 0.7   GLU 97 88 83   CALCIUM 8.3* 8.2* 8.4*   MG 1.5* 1.7 1.5*   PHOS 2.2* 2.4* 2.0*     Recent Labs   Lab 12/08/18 0621 12/09/18  0515 12/10/18  0439   ALKPHOS 66 65 68   ALT 13 15 15   AST 26 28 32   ALBUMIN 2.3* 2.3* 2.1*   PROT 6.3 6.4 6.4   BILITOT 0.9 0.9 0.8      Recent Labs   Lab 12/08/18  2359 12/09/18  0601 12/09/18  1159 12/09/18  1734 12/09/18  2352 12/10/18  0621   POCTGLUCOSE 118* 83 120* 124* 86 86     No results for input(s): CPK, CPKMB, MB, TROPONINI in the last 72 hours.    No results for input(s): LACTATE in the last 72 hours.     Procedures: 24-hour video EEG    Consultants: Neurosurgery: C-spine injury, Respiratory therapy: vent, Physical medicine and rehab: c-spine injury, Nutrition: diet, Vascular neurology/Epilepsy: EEG reading, PT/OT:  evaluate and treat, Cardiology:  Cardiac arrest, Wound care: thigh wounds, Speech: diet and speech goals     Current Discharge Medication List      CONTINUE these medications which have NOT CHANGED    Details   amLODIPine (NORVASC) 5 MG tablet Take 5 mg by mouth once daily.      DULoxetine (CYMBALTA) 60 MG capsule Take 60 mg by mouth once daily.      levETIRAcetam (KEPPRA) 750 MG Tab Take 1,500 mg by mouth 2 (two) times daily.      levothyroxine (SYNTHROID) 100 MCG tablet Take 100 mcg by mouth once daily.      metFORMIN (GLUCOPHAGE) 500 MG tablet Take 500 mg by mouth 2 (two) times daily with meals.      propranolol (INDERAL) 20 MG tablet Take 20 mg by mouth 2 (two) times daily.             Discharge Diet:soft diet with Normal Fluid intake of 1500 - 2000 mL per day    Activity: activity as tolerated    Discharge Condition: Good    Disposition: Nursing Facility    Tests pending at the time of discharge: none      Time spent  on the discharge of the patient including review of hospital course with the patient. reviewing discharge medications and arranging follow-up care     Discharge examination Patient was seen and examined on the date of discharge and determined to be suitable for discharge.    Discharge plan and follow up:      Future Appointments   Date Time Provider Department Center   1/15/2019 11:00 AM Mariano Arredondo PA-C [facility]       Provider    I personally scribed for Zoran Morillo MD on 12/10/2018 at 9:30 AM. Electronically signed by ana Najera III on 12/10/2018 at 9:30 AM

## 2018-12-11 NOTE — PHYSICIAN QUERY
PT Name: Melania Malagon  MR #: [id number]     PHYSICIAN QUERY -  ELECTROLYTE CLARIFICATION      CDS/: Vicky Chery               Contact information: [phone]  This form is a permanent document in the medical record.     Query Date: December 11, 2018    By submitting this query, we are merely seeking further clarification of documentation to reflect the severity of illness of your patient. Please utilize your independent clinical judgment when addressing the question(s) below.    The Medical record reflects the following:     Indicators   Supporting Clinical Findings Location in Medical Record   x Lab Value(s) Potassium Level 4.4, 2.8    Magnesium Level 2.1, 1.3, 1.5   Labs 12/1, 12/10    Labs 12/1, 12/7, 12/10   x Treatment                                 Medication Potassium Chloride 10% Oral 40 meq    Potassium Chloride 10% Oral 20%    Magnesium Oxide 800 mg PO  Mar 12/5      Mar 12/10      Mar 12/5    Other       Provider, please specify the diagnosis or diagnoses that correspond(s) to the above indicators. Carlos Alberto all that apply:    [ x  ] Hypokalemia   [ x  ] Hypomagnesemia   [   ] Other electrolyte disturbance (please specify): _______   [   ]  Clinically Undetermined       Please document in your progress notes daily for the duration of treatment until resolved, and include in your discharge summary.

## 2018-12-27 NOTE — PROCEDURES
DATE OF STUDY:  12/02/2018    DURATION:  18 hours and 54 minutes.    ICU EEG AND VIDEO MONITORING REPORT    METHODOLOGY:  Electroencephalographic (EEG) is recorded with electrodes placed   according to the International 10-20 placement system.  Thirty two (32) channels   of digital signal (sampling rate of 512/sec), including T1 and T2, were   simultaneously recorded from the scalp and may include EKG, EMG, and/or eye   monitors.  Recording band pass was 0.1 to 512 Hz.  Digital video recording of   the patient is simultaneously recorded with the EEG.  The patient is instructed   to report clinical symptoms which may occur during the recording session.  EEG   and video recording are stored and archived in digital format.  Activation   procedures, which include photic stimulation, hyperventilation and instructing   patients to perform simple tasks, are done in selected patients.    The EEG is displayed on a monitor screen and can be reviewed using different   montages.  Computer-assisted analysis is employed to detect spike and   electrographic seizure activity.  The entire record is submitted for computer   analysis.  The entire recording is visually reviewed, and the times identified   by computer analysis as being spikes or seizures are reviewed again.    Compressed spectral analysis (CSA) is also performed on the activity recorded   from each individual channel.  This is displayed as a power display of   frequencies from 0 to 30 Hz over time.  The CSA is reviewed looking for   asymmetries in power between homologous areas of the scalp, then compared with   the original EEG recording.    [employer] software was also utilized in the review of this study.  This software   suite analyzes the EEG recording in multiple domains.  Coherence and rhythmicity   are computed to identify EEG sections which may contain organized seizures.    Each channel undergoes analysis to detect the presence of spike and sharp waves   which  have special and morphological characteristics of epileptic activity.  The   routine EEG recording is converted from special into frequency domain.  This is   then displayed comparing homologous areas to identify areas of significant   asymmetry.  Algorithm to identify non-cortically generated artifact is used to   separate artifact from the EEG.    RECORDING TIMES:  Start on 12/02/2018, at 11:50.  Stop on 12/03/2018, at 07:00.  A total of 18 hours and 54 minutes of EEG were recorded.    EEG FINDINGS:  This record is medium to high amplitude and consists of a mix of   alpha and beta activity diffusely with an 8 Hz to 9 Hz posterior dominant rhythm   seen in the occipital channels.  The record is generally symmetrical.    There are frequent epileptiform disturbances noted in the form of spike and wave   and polyspike and wave discharges seen generally at 2 Hz.  These discharges are   intermittent and bisynchronous and appeared to be symmetrical.  At times, sharp   runs of polyspikes and rhythmic beta activity is seen preceding a slow wave,   but at other times, individual spike and wave activity at about 2 Hz tends to   predominate.  This is seen frequently throughout this record and at times, there   are runs of spike and wave activity and rhythmic activity lasting up to 30 to   40 seconds suggesting possible intermittent subclinical seizure activity as well   as frequent interictal epileptiform disturbances.  However, these episodes are   self-resolving and nonsustained.  Generalized convulsive seizure activity is not   seen.  The epileptiform disturbances are seen both during sleep and   wakefulness.  The waking background is characterized by a mix of alpha and beta   activity, but also with an abundance of theta frequency seen during wakefulness.    Sleep is characterized by a lower amplitude overall background with   attenuation of faster frequencies and central beta and sleep spindles.    INTERPRETATION:   Abnormal EEG due to the frequent presence of interictal   epileptiform abnormality suggestive of primary generalized epilepsy and possibly   a slow spike and wave morphology of progressive epilepsy.  Polyspikes are seen   as well as spike and wave and there are a few longer runs of interictal activity   suggesting subclinical seizures.  Results suggest an underlying seizure   disorder with suboptimal control, but clinical correlation is required.      NBB/IN  dd: 12/27/2018 08:01:11 (CST)  td: 12/27/2018 08:14:42 (CST)  Doc ID   #[id number]  Job ID #[id number]    CC:

## 2019-01-23 NOTE — PROCEDURES
In error    UM/HN  dd: 01/21/2019 18:55:40 (CST)  td: 01/22/2019 04:17:40 (CST)  Doc ID   #[id number]  Job ID #[id number]    CC:

## 2019-01-23 NOTE — PROCEDURES
ICU EEG/VIDEO MONITORING REPORT    Melania Malagon  [id number]  1945    DATE OF SERVICE: 12/4/18    DATE OF ADMISSION: 11/22/2018  1:27 PM    ADMITTING PROVIDER: Ernesto Albarran MD    REASON FOR CONSULT: 74yo F admitted with seizures and AMS    METHODOLOGY   Electroencephalographic (EEG) recording is with electrodes placed according to the International 10-20 placement system.  Thirty two (32) channels of digital signal are simultaneously recorded from the scalp and may include EKG, EMG, and/or eye monitors.   Recording band pass was 0.1 to 512 hz.  Digital video recording of the patient is simultaneously recorded with the EEG.  The nursing staff report clinical symptoms and may press an event button when the patient has symptoms of clinical interest to the treating physicians.  EEG and video recording is stored and archived in digital format.  The entire recording is visually reviewed and the times identified by computer analysis as being spikes or seizures are reviewed again.  Activation procedures which include photic stimulation, hyperventilation and instructing patients to perform simple task are done in selected patients.   Compresses spectral analysis (CSA) is also performed on the activity recorded from each individual channel.  This is displayed as a power display of frequencies from 0 to 30 Hz over time.   The CSA analysis is done and displayed continuously.  This is reviewed for asymmetries in power between homologous areas of the scalp and for presence of changes in power which canbe seen when seizures occur.  Sections of suspected abnormalities on the CSA is then compared with the original EEG recording.     [employer] software was also utilized in the review of this study.  This software suite analyzes the EEG recording in multiple domains.  Coherence and rhythmicity is computed to identify EEG sections which may contain organized seizures.  Each channel undergoes analysis to detect presence of spike  and sharp waves which have special and morphological characteristic of epileptic activity.  The routine EEG recording is converted from spacial into frequency domain.  This is then displayed comparing homologous areas to identify areas of significant asymmetry.  Algorithm to identify non-cortically generated artifact is used to separate eye movement, EMG and other artifact from the EEG.      Recording Times  Start on 12/4/18, 07:00  Stop on 12/4/18, 16:20    A total of 9 hours and 20 minutes of EEG was recorded.    EEG FINDINGS  Background activity:   The background rhythm was characterized by sharply contoured theta > delta (2-6 Hz) activity  No posterior dominant rhythm seen.   Symmetry and continuity: the background appeared continuous and asymmetric     Sleep:   Not seen     Activation procedures:   Not done     Abnormal activity:   Frequent to abundant generalized 1-2 Hz spike-polyspike and wave discharges are seen throughout the period of review without any evolution      IMPRESSION:   This is an abnormal C-EEG, due to:  1) frequent to abundant generalized epileptiform discharges seen, suggestive of primary generalized epilepsy  2) moderate to severe generalized slowing seen, suggestive of moderate to severe diffuse or multifocal cerebral dysfunction    CLINICAL CORRELATION IS RECOMMENDED.    Marti Tsai MD, VALORIE(), TRUPTI ESPINAL.  Neurology-Epilepsy.  [facility].          MITA/ROBB  dd: 01/22/2019 18:53:40 (CST)  td: 01/23/2019 00:14:33 (CST)  Doc ID   #[id number]  Job ID #[id number]    CC:

## 2021-07-01 ENCOUNTER — PATIENT MESSAGE (OUTPATIENT)
Dept: ADMINISTRATIVE | Facility: OTHER | Age: 76
End: 2021-07-01

## 2023-01-03 NOTE — ASSESSMENT & PLAN NOTE
74 yo female admitted to [facility] with NCSE also found to have type I atlas and type II odontoid fractures.    No acute events overnight. Pt remains intubated. E4VTM6.    --Continue care per primary team.  --Continue AED regimen per primary team.  --Continue cervical orthosis in rigid collar at all times  --wean to extubate.  --Stearns fracture is stable, odontoid fracture is unstable may be amenable to surgical intervention, will likely do as outpatient as patient is neurologically stable  --We will continue to monitor closely, please contact us with any questions or concerns.     general

## 2023-06-06 NOTE — PROGRESS NOTES
[facility]  Neurocritical Care  Progress Note    Admit Date: 11/22/2018  Service Date: 12/03/2018  Length of Stay: 11    Subjective:     Chief Complaint: Cervical spine fracture    History of Present Illness: 73 y F transferred from [place] for cervical spine injury and seizure activity. She has pmh of HTN, DM, seizure disorder and had an unwitnessed seizure (presumed) at nursing home today followed by fall. On arrival by EMS she was responding minimally. Did not require CPR. She had one seizure en route to hospital at [place] and received versed. At ER at OSH, patient had another witnessed seizure with tonic clonic activity in extremities and became unresponsive. She was intubated, and given iv fosphenytoin.  CTH was negative. CT c-spine showed odontoid fracture and C1 fracture. She received versed and fosphenytoin and OSH, when she was transferred by helicopter to [facility], en route she was started on ketamine gtt at 0.5 mcg/kg/min. She was transferred to [facility] for cervical fracture requiring neurosurg intervention and also seizure monitoring and control.      TO note  Patient had recent pneumonia and GI bleed earlier this month and was treated at OSH. She was discharged on 11/20 with levaquin for pneumonia and returned to NH     Hospital Course: 11/22: admit to Neuro ICU for higher level of care. Neurosurgery consulted for C-spine fracture. MRI c-spine ordered. Continous EEG monitoring. Epilepsy consulted. Loaded keppra and maintenance keppra  11/26: seroquel, derm consult, discussion with NSGY regarding surgical plan, daughter updated at bedside  11/27: ECG, increase seroquel, Vtach this morning, then bradycardia, wean off precedex, consult nutrition for TF change, Husks x 3 days, obtain smaller MJcollar  11/28: weaning parameters again, stop vimpat, metoprolol, decrease keppra, wean precedex, anemia workup, bradycardia with hypotension event this morning  11/29: no cuff leak this morning, start  decadron then re-eval later  11/30: extubate today, start SQH, PT OT SLP  12/1: FRANCIS, Passed swallow eval today   12/2 Had a VF cardiac arrest and was intubated last night   12/3: EEG read, continue amio, replete Mg, wean to extubate today, place CVC for better access    Review of Symptoms:   Unable to obtain fully due to intubation  Denies pain, sob, CP, dysuria, headache    Physical Exam:  GA: comfortable, moderate distress from ETT  HEENT: No scleral icterus or JVD.   Pulmonary: Clear to auscultation A/L. No wheezing, crackles, or rhonchi. intubated  Cardiac: RRR S1 & S2 w/o rubs/murmurs/gallops.   Abdominal: Bowel sounds present x 4. No appreciable hepatosplenomegaly.  Skin: No jaundice, rashes, or visible lesions.  Pulses: 1+ Dp bilat    Neuro:  --sedation: none  --GCS: C1W1hD5  --Mental Status: awake, follows commands   --CN II-XII grossly intact.   --Pupils 3-->2mm, PERRL.   --brainstem: intact  --Motor: 4/5 throughout  --sensory: see motor  --Reflexes: not tested  --Gait: deferred    Recent Labs   Lab 12/03/18  0158   WBC 10.61   RBC 3.51*   HGB 8.4*   HCT 29.1*      MCV 83   MCH 23.9*   MCHC 28.9*     Recent Labs   Lab 12/03/18  0158   CALCIUM 8.0*   PROT 6.6      K 3.8   CO2 25      BUN 23   CREATININE 1.1   ALKPHOS 63   ALT 19   AST 44*   BILITOT 0.6     Recent Labs   Lab 12/03/18  0158   INR 1.2     Vent Mode: Spont  Oxygen Concentration (%):  [] 40  Resp Rate Total:  [14 br/min-62 br/min] 21 br/min  Vt Set:  [450 mL] 450 mL  PEEP/CPAP:  [5 cmH20] 5 cmH20  Pressure Support:  [0 cmH20-10 cmH20] 10 cmH20  Mean Airway Pressure:  [8.1 dpC12-22 cmH20] 9 cmH20    Temp: 99.3 °F (37.4 °C)  Pulse: 96  Rhythm: sinus bradycardia  BP: (!) 160/65  MAP (mmHg): 98  CI (L/min/m2): 3.2 L/min/m2  SVI: 44  SVV: 6 %  Resp: 19  SpO2: 100 %  Oxygen Concentration (%): 40  O2 Device (Oxygen Therapy): ventilator  Vent Mode: Spont  Set Rate: 0 bmp  Vt Set: 450 mL  Pressure Support: 10 cmH20  PEEP/CPAP: 5  cmH20  Peak Airway Pressure: 16 cmH2O  Mean Airway Pressure: 9 cmH20  Plateau Pressure: 22 cmH20    Temp  Min: 98.2 °F (36.8 °C)  Max: 100.6 °F (38.1 °C)  Pulse  Min: 55  Max: 96  BP  Min: 108/52  Max: 161/70  MAP (mmHg)  Min: 75  Max: 103  CI (L/min/m2)  Min: 3 L/min/m2  Max: 3.8 L/min/m2  SVI  Min: 36  Max: 49  SVV  Min: 3 %  Max: 9 %  Resp  Min: 3  Max: 26  SpO2  Min: 99 %  Max: 100 %  Oxygen Concentration (%)  Min: 40  Max: 100    12/02 0701 - 12/03 0700  In: 797.4 [I.V.:697.4]  Out: 0    Unmeasured Output  Urine Occurrence: 1  Stool Occurrence: 0  Emesis Occurrence: 0  Pad Count: 1    Nutrition Prescription Ordered  Current Diet Order: NPO  Nutrition Order Comments: TF held  Current Nutrition Support Formula Ordered: Glucerna 1.5  Current Nutrition Support Rate Ordered: 45 (ml)  Current Nutrition Support Frequency Ordered: mL/hr  Last Bowel Movement: 12/02/18    Body mass index is 36.14 kg/m².      I have personally reviewed all labs, imaging, and studies today      Assessment/Plan:     Neuro   * Cervical spine fracture    MRI c-spine without cord compression, contusion or hematoma  CT c-spine with minimally displaced fractures of the posterior arch of the C1 vertebral body in keeping with type 1 Sidney fracture and Type 2 odontoid fracture  C-collar in place  Per NSGY no plans to surgically intervene at the moment  Strict C spine precautions      Seizures    Continue to monitor  Continue keppra  EEG read pending  Intermittent GPEDs     Psychiatric   Restlessness and agitation    Off seroquel  No pcdx  monitor     Cardiac/Vascular   Cardiac arrest    VF arrest verses unstable Torsades  Appreciate cards recs  Continue amio  Needs workup once stable  Treat seizures       VT (ventricular tachycardia)    Episode of torsade  Cardiology following  Maintain Mg >2  Monitor closley     Endocrine   Hypothyroidism    Continue synthroid      Other   Endotracheally intubated    Intubated due to unstable VT episode  Wean  to extubate  CXR  ABG  Vent Mode: Spont  Oxygen Concentration (%):  [] 40  Resp Rate Total:  [14 br/min-62 br/min] 21 br/min  Vt Set:  [450 mL] 450 mL  PEEP/CPAP:  [5 cmH20] 5 cmH20  Pressure Support:  [0 cmH20-10 cmH20] 10 cmH20  Mean Airway Pressure:  [8.1 cqM30-24 cmH20] 9 cmH20             The patient is being Prophylaxed for:  Venous Thromboembolism with: Chemical  Stress Ulcer with: H2B  Ventilator Pneumonia with: chlorhexidine oral care    Activity Orders          None        Full Code    Gagandeep Amezcua MD  Neurocritical Care  [facility]    Cc time 40 minutes  Critical Care was time spent personally by me on the following activities: development of treatment plan with patient or surrogate and bedside caregivers, discussions with consultants, evaluation of patient's response to treatment, examination of patient, ordering and performing treatments and interventions, ordering and review of laboratory studies, ordering and review of radiographic studies, pulse oximetry, re-evaluation of patient's condition. This critical care time did not overlap with that of any other provider or involve time for any procedures.       [Joint Pain] : joint pain